# Patient Record
Sex: FEMALE | Race: WHITE | HISPANIC OR LATINO | Employment: FULL TIME | URBAN - METROPOLITAN AREA
[De-identification: names, ages, dates, MRNs, and addresses within clinical notes are randomized per-mention and may not be internally consistent; named-entity substitution may affect disease eponyms.]

---

## 2017-02-02 ENCOUNTER — OFFICE VISIT (OUTPATIENT)
Dept: DERMATOLOGY | Facility: CLINIC | Age: 42
End: 2017-02-02

## 2017-02-02 DIAGNOSIS — L91.0 KELOID SCAR: ICD-10-CM

## 2017-02-02 DIAGNOSIS — D22.9 DERMAL AND EPIDERMAL NEVUS: ICD-10-CM

## 2017-02-02 DIAGNOSIS — L81.4 SOLAR LENTIGINOSIS: ICD-10-CM

## 2017-02-02 DIAGNOSIS — D18.01 CHERRY ANGIOMA: ICD-10-CM

## 2017-02-02 DIAGNOSIS — D22.9 MULTIPLE BENIGN NEVI: ICD-10-CM

## 2017-02-02 DIAGNOSIS — L72.11 PILAR CYST: ICD-10-CM

## 2017-02-02 DIAGNOSIS — Z80.8 FAMILY HISTORY OF MELANOMA: Primary | ICD-10-CM

## 2017-02-02 ASSESSMENT — PAIN SCALES - GENERAL: PAINLEVEL: NO PAIN (0)

## 2017-02-02 NOTE — Clinical Note
2/2/2017       RE: Shauna Galdamez  [address] [place] [address]     Dear Colleague,    Thank you for referring your patient, Shauna Galdamez, to the [facility] DERMATOLOGY at [facility]. Please see a copy of my visit note below.    [facility] Dermatology Note    Dermatology Problem List:  Multiple benign appearing nevi  Family history of melanoma  Pilar cyst - right apex scalp    Encounter Date: Feb 2, 2017    CC:   Chief Complaint   Patient presents with     Derm Problem     Shauna is here today for a skin check.        History of Present Illness:  Shauna Galdamez is a 41 year old who presents for follow up evaluation for history of multiple nevi.  She also had a biopsy that had keloidal scar of her central abdomen, she denies any fevers or chills, she denies any lesions of concern today other than a cyst in her scalp she would like removed.  She states that the keloid is very itchy, and she has had keloids in the past.     She also complains again of dermal nevi on her face that have been present for a very long time and not changing in any way. However, she recently has a 2 year old child who has a habit of picking at the patient's dermal nevi on her face despite their attempts to stop the behavior. The patient would like to discuss potential removal of these today.     Past Medical History:   History reviewed. No pertinent past medical history.  History reviewed. No pertinent past surgical history.    Medications:  Current Outpatient Prescriptions   Medication Sig Dispense Refill     triamcinolone acetonide (KENALOG) 10 MG/ML injection Inject 1 mL (10 mg) into the skin once for 1 dose 5 mL 0     FLUoxetine HCl (PROZAC PO)           Allergies   Allergen Reactions     Sulfa Drugs Unknown       Review of Systems:  -Skin Establ Pt: The patient denies any new rash, pruritus, or lesions that are symptomatic, changing or  bleeding, except as per HPI.  -Constitutional: The patient denies fatigue, fevers, chills, unintended weight loss, and night sweats.  -HEENT: Patient denies nonhealing oral sores.    Physical exam:  There were no vitals taken for this visit.  - This is a well developed, well-nourished female in no acute distress, in a pleasant mood.    - Oliva's skin type 2  - A full skin examination was performed including the face, scalp, ears, eyelids, lips, neck, chest, back, abdomen, hands, feet, upper and lower extremities and buttocks was performed and findings are as follows:  1 - Site of previous biopsy is examined and no evidence of recurrence is noted by inspection or palpation. There is however a thick keloidal scar that appears out of proportion of the biopsy site  2 - Benign nevus- Multiple brown pigmented macules and papules are identified on the exam that are under 0.6cm in size and show normal pigment network and benign growth patterns located on the chest, arms, legs  3 - Cherry Angiomas - cherry red papules scattered on the trunk.  4 - Solar lentigines - Scattered brown macules on sun exposed areas.  5 - Dermal nevus - Skin colored fleshy papule(s) located on the face, benign appearing.  6 - Cyst of skin- raised dome shaped 7 mm nodule with a central punctum located on right apex scalp.  - No other lesions of concern on areas examined.     Impression/Plan:  1 - Hypertrophic Scar:  A total of 0.1 mL of kenalog 20mg/ml was injected into the scar.  The patient was advised of the risk of over correction and subsequent atrophy.   2 - Benign nevi- abcd of melanoma were discussed and self skin checks were advised.   3 - Cherry Angiomas: Benign, patient reassured.  No treatment necessary  4 - Solar lentiginosis: Sun protection with SPF 30 or higher recommended  5 - Benign nevi- abcd of melanoma were discussed and self skin checks were advised.   6 - Cyst of the skin- A cyst was identified on r apex scalp. Due to the  location and symptoms associated with it the patient will be scheduled for excision on the next available excision time slot.       Follow-up 1 year or sooner     staffed the patient.    Staff Involved:  Resident(Hanna Cortés)/Staff(as above)          I have personally examined this patient and agree with Dr. Cortés's documentation and plan of care. I have reviewed and amended the resident's note above. The documentation accurately reflects my clinical observations, diagnoses, treatment and follow-up plans.   I was present for key portions of the procedure.     Lucretia Hoover MD  Dermatology Staff

## 2017-02-02 NOTE — MR AVS SNAPSHOT
After Visit Summary   2/2/2017    Shauna Gadlamez    MRN: [id number]           Patient Information     Date Of Birth          1975        Visit Information        Provider Department      2/2/2017 10:15 AM Hanna Cortés MD [facility] Dermatology        Today's Diagnoses     Family history of melanoma    -  1     Multiple benign nevi         Cherry angioma         Solar lentiginosis         Dermal and epidermal nevus            Follow-ups after your visit        Your next 10 appointments already scheduled     Mar 16, 2017  8:00 AM   (Arrive by 7:45 AM)   Procedure with Hanna Cortés MD   [facility] Dermatology ([facility])    [address] [address] [phone]              Who to contact     Please call your clinic at [phone] to:    Ask questions about your health    Make or cancel appointments    Discuss your medicines    Learn about your test results    Speak to your doctor   If you have compliments or concerns about an experience at your clinic, or if you wish to file a complaint, please contact [facility] Patient Relations at [phone] or email us at [email or web address]         Additional Information About Your Visit        MyChart Information     BuildMyMovet gives you secure access to your electronic health record. If you see a primary care provider, you can also send messages to your care team and make appointments. If you have questions, please call your primary care clinic.  If you do not have a primary care provider, please call [phone] and they will assist you.      ADIKTIVO is an electronic gateway that provides easy, online access to your medical records. With ADIKTIVO, you can request a clinic appointment, read your test results, renew a prescription or communicate with your care team.     To access your existing account, please contact your [facility] Clinic or call [phone] for assistance.        Care EveryWhere ID     This is your Care EveryWhere ID. This could be used by other organizations to access your [facility] medical records  [id number]         Blood Pressure from Last 3 Encounters:   11/30/15 115/78    Weight from Last 3 Encounters:   11/30/15 67.132 kg (148 lb)              Today, you had the following     No orders found for display       Primary Care Provider    No Ref-Primary Verified       No address on file        Thank you!     Thank you for choosing [facility] DERMATOLOGY  for your care. Our goal is always to provide you with excellent care. Hearing back from our patients is one way we can continue to improve our services. Please take a few minutes to complete the written survey that you may receive in the mail after your visit with us. Thank you!             Your Updated Medication List - Protect others around you: Learn how to safely use, store and throw away your medicines at www.disposemymeds.org.          This list is accurate as of: 2/2/17 11:02 AM.  Always use your most recent med list.                   Brand Name Dispense Instructions for use    PROZAC PO

## 2017-02-02 NOTE — PROGRESS NOTES
[facility] Dermatology Note    Dermatology Problem List:  Multiple benign appearing nevi  Family history of melanoma  Pilar cyst - right apex scalp    Encounter Date: Feb 2, 2017    CC:   Chief Complaint   Patient presents with     Derm Problem     Shauna is here today for a skin check.        History of Present Illness:  Shauna Galdamez is a 41 year old who presents for follow up evaluation for history of multiple nevi.  She also had a biopsy that had keloidal scar of her central abdomen, she denies any fevers or chills, she denies any lesions of concern today other than a cyst in her scalp she would like removed.  She states that the keloid is very itchy, and she has had keloids in the past.     She also complains again of dermal nevi on her face that have been present for a very long time and not changing in any way. However, she recently has a 2 year old child who has a habit of picking at the patient's dermal nevi on her face despite their attempts to stop the behavior. The patient would like to discuss potential removal of these today.     Past Medical History:   History reviewed. No pertinent past medical history.  History reviewed. No pertinent past surgical history.    Medications:  Current Outpatient Prescriptions   Medication Sig Dispense Refill     triamcinolone acetonide (KENALOG) 10 MG/ML injection Inject 1 mL (10 mg) into the skin once for 1 dose 5 mL 0     FLUoxetine HCl (PROZAC PO)           Allergies   Allergen Reactions     Sulfa Drugs Unknown       Review of Systems:  -Skin Establ Pt: The patient denies any new rash, pruritus, or lesions that are symptomatic, changing or bleeding, except as per HPI.  -Constitutional: The patient denies fatigue, fevers, chills, unintended weight loss, and night sweats.  -HEENT: Patient denies nonhealing oral sores.    Physical exam:  There were no vitals taken for this visit.  - This is a well developed, well-nourished female in no  acute distress, in a pleasant mood.    - Oliva's skin type 2  - A full skin examination was performed including the face, scalp, ears, eyelids, lips, neck, chest, back, abdomen, hands, feet, upper and lower extremities and buttocks was performed and findings are as follows:  1 - Site of previous biopsy is examined and no evidence of recurrence is noted by inspection or palpation. There is however a thick keloidal scar that appears out of proportion of the biopsy site  2 - Benign nevus- Multiple brown pigmented macules and papules are identified on the exam that are under 0.6cm in size and show normal pigment network and benign growth patterns located on the chest, arms, legs  3 - Cherry Angiomas - cherry red papules scattered on the trunk.  4 - Solar lentigines - Scattered brown macules on sun exposed areas.  5 - Dermal nevus - Skin colored fleshy papule(s) located on the face, benign appearing.  6 - Cyst of skin- raised dome shaped 7 mm nodule with a central punctum located on right apex scalp.  - No other lesions of concern on areas examined.     Impression/Plan:  1 - Hypertrophic Scar:  A total of 0.1 mL of kenalog 20mg/ml was injected into the scar.  The patient was advised of the risk of over correction and subsequent atrophy.   2 - Benign nevi- abcd of melanoma were discussed and self skin checks were advised.   3 - Cherry Angiomas: Benign, patient reassured.  No treatment necessary  4 - Solar lentiginosis: Sun protection with SPF 30 or higher recommended  5 - Benign nevi- abcd of melanoma were discussed and self skin checks were advised.   6 - Cyst of the skin- A cyst was identified on r apex scalp. Due to the location and symptoms associated with it the patient will be scheduled for excision on the next available excision time slot.       Follow-up 1 year or sooner     staffed the patient.    Staff Involved:  Resident(Hanna Cortés)/Staff(as above)          I have personally examined this  patient and agree with Dr. Cortés's documentation and plan of care. I have reviewed and amended the resident's note above. The documentation accurately reflects my clinical observations, diagnoses, treatment and follow-up plans.   I was present for key portions of the procedure.     Lucretia Hoover MD  Dermatology Staff

## 2017-02-02 NOTE — NURSING NOTE
Drug Administration Record    Drug Name: triamcinolone acetonide(kenalog)  Dose: 0.1mL of triamcinolone 40mg/mL, 4mg dose  Route administered: ID  NDC #: Kenalog-40 (2171-8469-33)  Amount of waste(mL):0.9mL  Reason for waste: As per MD

## 2017-02-02 NOTE — NURSING NOTE
Dermatology Rooming Note    Shauna Galdamez's goals for this visit include:   Chief Complaint   Patient presents with     Derm Problem     Shauna is here today for a skin check.      Jyoti Cross MA

## 2017-02-06 PROBLEM — D22.9 MULTIPLE BENIGN NEVI: Status: ACTIVE | Noted: 2017-02-06

## 2017-02-06 PROBLEM — L81.4 SOLAR LENTIGINOSIS: Status: ACTIVE | Noted: 2017-02-06

## 2017-02-06 PROBLEM — Z80.8 FAMILY HISTORY OF MELANOMA: Status: ACTIVE | Noted: 2017-02-06

## 2017-02-06 PROBLEM — L72.11 PILAR CYST: Status: ACTIVE | Noted: 2017-02-06

## 2017-02-06 PROBLEM — D22.9: Status: ACTIVE | Noted: 2017-02-06

## 2017-02-06 PROBLEM — D18.01 CHERRY ANGIOMA: Status: ACTIVE | Noted: 2017-02-06

## 2017-02-06 PROBLEM — L91.0 KELOID SCAR: Status: ACTIVE | Noted: 2017-02-06

## 2017-03-09 ENCOUNTER — OFFICE VISIT (OUTPATIENT)
Dept: DERMATOLOGY | Facility: CLINIC | Age: 42
End: 2017-03-09

## 2017-03-09 DIAGNOSIS — L72.11 PILAR CYST: Primary | ICD-10-CM

## 2017-03-09 RX ORDER — LIDOCAINE HYDROCHLORIDE AND EPINEPHRINE 10; 10 MG/ML; UG/ML
3 INJECTION, SOLUTION INFILTRATION; PERINEURAL ONCE
Qty: 3 ML | Refills: 0 | OUTPATIENT
Start: 2017-03-09 | End: 2017-03-09

## 2017-03-09 ASSESSMENT — PAIN SCALES - GENERAL
PAINLEVEL: NO PAIN (0)
PAINLEVEL: NO PAIN (0)

## 2017-03-09 NOTE — MR AVS SNAPSHOT
After Visit Summary   3/9/2017    Shauna Galdamez    MRN: [id number]           Patient Information     Date Of Birth          1975        Visit Information        Provider Department      3/9/2017 8:00 AM Tawanda Singh MD [facility] Dermatology        Today's Diagnoses     Pilar cyst    -  1      Care Instructions    Excision Wound Care Instructions  I will experience scar, altered skin color, bleeding, swelling, pain, crusting and redness. I may experience altered sensation. Risks are excessive bleeding, infection, muscle weakness, thick (hypertrophic or keloidal) scar, and recurrence,. A second procedure may be recommended to obtain the best cosmetic or functional result.  Possible complications of any surgical procedure are bleeding, infection, scarring, alteration in skin color and sensation, muscle weakness in the area, wound dehiscence or seperation, or recurrence of the lesion or disease. On occasion, after healing, a secondary procedure or revision may be recommended in order to obtain the best cosmetic or functional result.   After your surgery, a pressure bandage will be placed over the area that has sutures. This will help prevent bleeding. Please follow these instructions until you come back to clinic for suture removal on , as they will help you to prevent complications as your wound heals.  For the First 48 hours After Surgery:  1. Leave the pressure bandage on and keep it dry. If it should come loose, you may retape it, but do not take it off.  2. Relax and take it easy. Do not do any vigorous exercise, heavy lifting, or bending forward. This could cause the wound to bleed.  3. Post-operative pain is usually mild. You may take plain or extra strength Tylenol every 4 hours as needed (do not take more than 4,000mg in one day). Do not take any medicine that contains aspirin, ibuprofen or motrin unless you have been recommended these by a doctor.  Avoid alcohol and vitamin E as  these may increase your tendency to bleed.  4. You may put an ice pack around the bandaged area for 20 minutes every 2-3 hours. This may help reduce swelling, bruising, and pain. Make sure the ice pack is waterproof so that the pressure bandage does not get wet.   5. You may see a small amount of drainage or blood on your pressure bandage. This is normal. However, if drainage or bleeding continues or saturates the bandage, you will need to apply firm pressure over the bandage with a washcloth for 15 minutes. If bleeding continues after applying pressure for 15 minutes then go to the nearest emergency room.  48 Hours After Surgery  Carefully remove the bandage and start daily wound care and dressing changes. You may also now shower and get the wound wet. Wash wound with a mild soap and water.  Use caution when washing the wound. Be gentle and do not let the forceful shower stream hit the wound directly.  PAT dry.  Daily Wound Care:  1. Wash wound with a mild soap and water.  Use caution when washing the wound, be gentle and do not let the forceful shower stream hit the wound directly.  2. PAT DRY.  3. Apply Vaseline (from a new container or tube) over the suture line with a Q-tip. It is very important to keep the wound continuously moist, as wounds heal best in a moist environment.  4.  Keep the site covered until sutures are removed, you can cover it with a Telfa (non-stick) dressing and tape or a band-aid.    5. If you are unable to keep wound covered, you must apply Vaseline every 2 - 3 hours (while awake) to ensure it is being kept moist for optimal healing. A dressing overnight is recommended to keep the area moist.   Call Us If:  1. You have pain that is not controlled with Tylenol.  2. You have signs or symptoms of an infection, such as: fever over 100 degrees F, redness, warmth, or foul-smelling or yellow/creamy drainage from the wound.  Who should I call with questions?    [facility]  [place]: [phone]     [facility]: [phone]    For urgent needs outside of business hours call the [facility] at [phone] and ask for the dermatology resident on call            Follow-ups after your visit        Who to contact     Please call your clinic at [phone] to:    Ask questions about your health    Make or cancel appointments    Discuss your medicines    Learn about your test results    Speak to your doctor   If you have compliments or concerns about an experience at your clinic, or if you wish to file a complaint, please contact [facility] Patient Relations at [phone] or email us at [email or web address]         Additional Information About Your Visit        Software 2000hart Information     G-mode gives you secure access to your electronic health record. If you see a primary care provider, you can also send messages to your care team and make appointments. If you have questions, please call your primary care clinic.  If you do not have a primary care provider, please call [phone] and they will assist you.      G-mode is an electronic gateway that provides easy, online access to your medical records. With G-mode, you can request a clinic appointment, read your test results, renew a prescription or communicate with your care team.     To access your existing account, please contact your [facility] or call [phone] for assistance.        Care EveryWhere ID     This is your Care EveryWhere ID. This could be used by other organizations to access your [facility] medical records  [id number]         Blood Pressure from Last 3 Encounters:   No data found for BP    Weight from Last 3 Encounters:   No data found for Wt              Today, you had the following     No orders found for display         Today's Medication Changes          These changes are accurate as of: 3/9/17 11:59 PM.   If you have any questions, ask your nurse or doctor.               Start taking these medicines.        Dose/Directions    lidocaine 1% with EPINEPHrine 1:100,000 1 %-1:355911 injection   Used for:  Pilar cyst   Started by:  Tawanda Singh MD        Dose:  3 mL   Inject 3 mLs into the skin once for 1 dose   Quantity:  3 mL   Refills:  0            Where to get your medicines      Some of these will need a paper prescription and others can be bought over the counter.  Ask your nurse if you have questions.     You don't need a prescription for these medications     lidocaine 1% with EPINEPHrine 1:100,000 1 %-1:953671 injection                Primary Care Provider    No Ref-Primary Verified       No address on file        Thank you!     Thank you for choosing [facility] DERMATOLOGY  for your care. Our goal is always to provide you with excellent care. Hearing back from our patients is one way we can continue to improve our services. Please take a few minutes to complete the written survey that you may receive in the mail after your visit with us. Thank you!             Your Updated Medication List - Protect others around you: Learn how to safely use, store and throw away your medicines at www.disposemymeds.org.          This list is accurate as of: 3/9/17 11:59 PM.  Always use your most recent med list.                   Brand Name Dispense Instructions for use    lidocaine 1% with EPINEPHrine 1:100,000 1 %-1:534871 injection     3 mL    Inject 3 mLs into the skin once for 1 dose       PROZAC PO

## 2017-03-09 NOTE — PROGRESS NOTES
I talked with and examined Shauna Galdamez and I agree with the assessment and the plan. I was present for key portions of the procedure. VLADIMIR Kumar MD.

## 2017-03-09 NOTE — NURSING NOTE
Dermatology Rooming Note    Shauna Galdamez's goals for this visit include:   Chief Complaint   Patient presents with     Derm Problem     Patient comes to clinic today for excision of cyst on scalp.     Staci Strickland CMA

## 2017-03-09 NOTE — LETTER
3/9/2017       RE: Shauna Galdamez  [address] [place] [address]     Dear Colleague,    Thank you for referring your patient, Shauna Galdamez, to the [facility] DERMATOLOGY at [facility]. Please see a copy of my visit note below.    I talked with and examined Shauna Galdamez and I agree with the assessment and the plan. I was present for key portions of the procedure. VLADIMIR Kumar MD.      DERMATOLOGIC SURGERY REPORT    NAME OF PROCEDURE:  EXCISION AND INTERMEDIATE CLOSURE    Surgeon:  José Singh    PREOPERATIVE DIAGNOSIS:   Pilar cyst  POSTOPERATIVE DIAGNOSIS: Same  FINAL EXCISION SIZE:  1.0 cm x 0.5 cm, with 0 mm margin  FINAL REPAIR LENGTH:  1.0 cm    INDICATIONS:  This patient presented with a 1 cm x 0.5 cm pilar cyst on the R frontal scalp.  Excision was indicated.  We discussed the principles of treatment and most likely complications including bleeding, infection, wound dehiscence, pain, nerve damage, and scarring.  Informed consent was obtained and the patient underwent the procedure as follows.    PROCEDURE:  The patient was taken to the operative suite.  The treatment area was anesthetized with 1% lidocaine and epinephrine mixed 1:100,000 with 0.5% Marcaine.  The area was washed with Hibiclens, rinsed with saline and draped with sterile towels.  The lesion was delineated and excised down to subcutaneous fat.  Hemostasis was obtained by electrocoagulation.  With a margin of 0 mm, the final excision size was 1 cm x 0.1 cm.      REPAIR:  In order to repair this defect while maintaining the normal anatomic relations and function, we elected to utilize an intermediate linear closure.  Closure was oriented so that the wound was in the patient's natural skin tension lines.  Deeper layers of the subcutaneous tissue were undermined first.  Deep dermal and subcutaneous layer closure was performed using 4-0 Vicryl deep, intradermal and  subcutaneous sutures.   Final cutaneous approximation was achieved with three 4-0 chromic gut simple interrupted sutures.     The final wound length was 1.0 cm.  A total of 3 ml of anesthesia was administered for all surgical sites.  Estimated blood loss was less than 1 ml for all surgical sites.  A sterile pressure dressing was applied and wound care instructions, with a written handout, were given.  The patient was [facility] from the [facility] alert and ambulatory.        Again, thank you for allowing me to participate in the care of your patient.      Sincerely,    Tawanda Singh MD

## 2017-03-09 NOTE — PATIENT INSTRUCTIONS

## 2017-03-09 NOTE — PROGRESS NOTES
DERMATOLOGIC SURGERY REPORT    NAME OF PROCEDURE:  EXCISION AND INTERMEDIATE CLOSURE    Surgeon:  José Singh    PREOPERATIVE DIAGNOSIS:   Pilar cyst  POSTOPERATIVE DIAGNOSIS: Same  FINAL EXCISION SIZE:  1.0 cm x 0.5 cm, with 0 mm margin  FINAL REPAIR LENGTH:  1.0 cm    INDICATIONS:  This patient presented with a 1 cm x 0.5 cm pilar cyst on the R frontal scalp.  Excision was indicated.  We discussed the principles of treatment and most likely complications including bleeding, infection, wound dehiscence, pain, nerve damage, and scarring.  Informed consent was obtained and the patient underwent the procedure as follows.    PROCEDURE:  The patient was taken to the operative suite.  The treatment area was anesthetized with 1% lidocaine and epinephrine mixed 1:100,000 with 0.5% Marcaine.  The area was washed with Hibiclens, rinsed with saline and draped with sterile towels.  The lesion was delineated and excised down to subcutaneous fat.  Hemostasis was obtained by electrocoagulation.  With a margin of 0 mm, the final excision size was 1 cm x 0.1 cm.      REPAIR:  In order to repair this defect while maintaining the normal anatomic relations and function, we elected to utilize an intermediate linear closure.  Closure was oriented so that the wound was in the patient's natural skin tension lines.  Deeper layers of the subcutaneous tissue were undermined first.  Deep dermal and subcutaneous layer closure was performed using 4-0 Vicryl deep, intradermal and subcutaneous sutures.   Final cutaneous approximation was achieved with three 4-0 chromic gut simple interrupted sutures.     The final wound length was 1.0 cm.  A total of 3 ml of anesthesia was administered for all surgical sites.  Estimated blood loss was less than 1 ml for all surgical sites.  A sterile pressure dressing was applied and wound care instructions, with a written handout, were given.  [facility] and ambulatory.

## 2017-03-13 LAB — COPATH REPORT: NORMAL

## 2017-04-27 ENCOUNTER — OFFICE VISIT (OUTPATIENT)
Dept: DERMATOLOGY | Facility: CLINIC | Age: 42
End: 2017-04-27

## 2017-04-27 DIAGNOSIS — D48.5 NEOPLASM OF UNCERTAIN BEHAVIOR OF SKIN: Primary | ICD-10-CM

## 2017-04-27 RX ORDER — LIDOCAINE HYDROCHLORIDE AND EPINEPHRINE 10; 10 MG/ML; UG/ML
3 INJECTION, SOLUTION INFILTRATION; PERINEURAL ONCE
Qty: 1 ML | Refills: 0 | OUTPATIENT
Start: 2017-04-27 | End: 2017-04-27

## 2017-04-27 ASSESSMENT — PAIN SCALES - GENERAL
PAINLEVEL: NO PAIN (0)
PAINLEVEL: NO PAIN (0)

## 2017-04-27 NOTE — PATIENT INSTRUCTIONS
Wound Care After a Biopsy    What is a skin biopsy?  A skin biopsy allows the doctor to examine a very small piece of tissue under the microscope to determine the diagnosis and the best treatment for the skin condition. A local anesthetic (numbing medicine)  is injected with a very small needle into the skin area to be tested. A small piece of skin is taken from the area. Sometimes a suture (stitch) is used.     What are the risks of a skin biopsy?  I will experience scar, bleeding, swelling, pain, crusting and redness. I may experience incomplete removal or recurrence. Risks of this procedure are excessive bleeding, bruising, infection, nerve damage, numbness, thick (hypertrophic or keloidal) scar and non-diagnostic biopsy.    How should I care for my wound for the first 24 hours?    Keep the wound dry and covered for 24 hours    If it bleeds, hold direct pressure on the area for 15 minutes. If bleeding does not stop then go to the emergency room    Avoid strenuous exercise the first 1-2 days or as your doctor instructs you    How should I care for the wound after 24 hours?    After 24 hours, remove the bandage    You may bathe or shower as normal    If you had a scalp biopsy, you can shampoo as usual and can use shower water to clean the biopsy site daily    Clean the wound twice a day with gentle soap and water    Do not scrub, be gentle    Apply white petroleum/Vaseline after cleaning the wound with a cotton swab or a clean finger, and keep the site covered with a Bandaid /bandage. Bandages are not necessary with a scalp biopsy    If you are unable to cover the site with a Bandaid /bandage, re-apply ointment 2-3 times a day to keep the site moist. Moisture will help with healing    Avoid strenuous activity for first 1-2 days    Avoid lakes, rivers, pools, and oceans until the stitches are removed or the site is healed    How do I clean my wound?    Wash hands for 15 minutes with soap or use hand  before  all wound care    Clean the wound with gentle soap and water    Apply white petroleum/Vaseline  to wound after it is clean    Replace the Bandaid /bandage to keep the wound covered for the first few days or as instructed by your doctor    If you had a scalp biopsy, warm shower water to the area on a daily basis should suffice    What should I use to clean my wound?     Cotton-tipped applicators (Qtips )    White petroleum jelly (Vaseline ). Use a clean new container and use Q-tips to apply.    Bandaids   as needed    Gentle soap     How should I care for my wound long term?    Do not get your wound dirty    Keep up with wound care for one week or until the area is healed.    A small scab will form and fall off by itself when the area is completely healed. The area will be red and will become pink in color as it heals. Sun protection is very important for how your scar will turn out. Sunscreen with an SPF 30 or greater is recommended once the area is healed.    If you have stitches, stitches need to be removed in 14 days. You may return to our clinic for this or you may have it done locally at your doctor s office.    You should have some soreness but it should be mild and slowly go away over several days. Talk to your doctor about using tylenol for pain,    When should I call my doctor?  If you have increased:     Pain or swelling    Pus or drainage (clear or slightly yellow drainage is ok)    Temperature over 100F    Spreading redness or warmth around wound    When will I hear about my results?  The biopsy results can take 2-3 weeks to come back. The clinic will call you with the results, send you a [employer] message, or have you schedule a follow-up clinic or phone time to discuss the results. Contact our clinics if you do not hear from us in 3 weeks.     Who should I call with questions?    [facility]: [phone]     [facility]: [phone]    For  urgent needs outside of business hours call the [facility] at [phone] and ask for the dermatology resident on call

## 2017-04-27 NOTE — PROGRESS NOTES
[facility] Dermatology Note      Dermatology Problem List:   1.  Multiple benign nevi.   2.  Family history of melanoma.   3.  Pilar cyst on right frontal scalp, excised on 03/09/2017.   4.  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        Encounter Date:  04/27/2017       CC:  Irritating mole on back.      History of Present Illness:   This is a 41-year-old, very pleasant female who is here with her daughter today for evaluation of a papule on the mid left back.  She reports that it was present on her skin check on 02/02/2017, but since then reports that she has had some irritation associated with her bra strap.  She states that she would like to have this removed; however, she has had issues with hypertrophic scarring and keloids in the past with previous biopsies and would prefer not to get a shave biopsy.  She has had surgical scars that have been sutured before that have healed better and have not left irritating, itchy scars.  She otherwise feels her usual state of health and reports that she has no other new lesions since February that are concerning.        Past Medical History:    No pertinent past medical history.      Social History:     Here with daughter today      Family History:   Not discussed     Medications:   The patient currently denies taking any medications.        Allergies:   Reviewed      Review of Systems:   Negative except for HPI.  No other skin concerns.   CONSTITUTIONAL:  No fevers, chills or night sweats.       Physical exam:   GENERAL:  This is a well-appearing  female in no acute distress, awake and oriented x3, cooperative.  Oliva skin type II.   SKIN:  Exam is performed on the face, neck, chest, abdomen and back as well as upper extremities.  There is a soft, brown papule over the left mid back right underneath her bra strap.  There are also multiple scattered brown macules as well as red vascular papules  on the trunk.  There are also brown macules over sun-exposed areas.  There is a well-healed scar over the right frontal scalp from previously excised pilar cyst.  There is a hypertrophic, bright-pink to violaceous plaque/scar on the lower abdomen.       Impression/Plan:   1.  Neoplasm of uncertain behavior:  Suspect irritated nevus.  We will perform a 4 mm punch biopsy to remove this lesion.  She preferred a punch biopsy over a shave biopsy due to concern with healing with shave biopsy.  She otherwise tolerated the procedure well; see below for procedure details.     2.  Benign nevi:  The patient had no concerning lesions.   3.  Hypertrophic scar:  Previously injected and has improved.  Not significantly itchy today.        Punch Biopsy:     After discussion of benefits and risks, including bleeding, infections, scar and incomplete removal, written consent was obtained.  A time-out was performed.  The area was cleansed with isopropyl alcohol.  About 1 mL of 1% lidocaine with epi was injected to the area, and a 4 mm punch biopsy was performed and closed with a 4-0 Prolene stitch using a figure-of-eight technique.  Vaseline and Band-Aid were applied, and care instructions were given as well as written instructions and her AVS.  The patient was discharged from Dermatology Clinic in good condition and was counseled to remove her stitches in 10-14 days.        This patient was seen and staffed with Dr. Kumar.  The patient will follow up in 1 year or sooner if there are any new lesions.        Follow up in 1 year or sooner.      Dr. Kota Kumar staffed the patient.      Staff Involved:   Dictated by Resident(Cortney Narayanan MD)/Staff(as above)     I talked with and examined Shauna Galdamez and I agree with the assessment and the plan. I was present for the discussion preceding the  procedure. VLADIMIR Kumar MD.

## 2017-04-27 NOTE — NURSING NOTE
Dermatology Rooming Note    Shauna Galdamez's goals for this visit include:   Chief Complaint   Patient presents with     Derm Problem     shauna is here today for a skin check. She would like a spot on her back looked at.     Jyoti Cross MA

## 2017-04-27 NOTE — LETTER
4/27/2017       RE: Shauna Galdamez  [address] [place] [address]     Dear Colleague,    Thank you for referring your patient, Shauna Galdamez, to the [facility] DERMATOLOGY at [facility]. Please see a copy of my visit note below.      Pictures were placed in Pt's chart today for future reference.      [facility] Dermatology Note      Dermatology Problem List:   1.  Multiple benign nevi.   2.  Family history of melanoma.   3.  Pilar cyst on right frontal scalp, excised on 03/09/2017.   4.  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        Encounter Date:  04/27/2017       CC:  Irritating mole on back.      History of Present Illness:   This is a 41-year-old, very pleasant female who is here with her daughter today for evaluation of a papule on the mid left back.  She reports that it was present on her skin check on 02/02/2017, but since then reports that she has had some irritation associated with her bra strap.  She states that she would like to have this removed; however, she has had issues with hypertrophic scarring and keloids in the past with previous biopsies and would prefer not to get a shave biopsy.  She has had surgical scars that have been sutured before that have healed better and have not left irritating, itchy scars.  She otherwise feels her usual state of health and reports that she has no other new lesions since February that are concerning.        Past Medical History:    No pertinent past medical history.      Social History:     Here with daughter today      Family History:   Not discussed     Medications:   The patient currently denies taking any medications.        Allergies:   Reviewed      Review of Systems:   Negative except for HPI.  No other skin concerns.   CONSTITUTIONAL:  No fevers, chills or night sweats.       Physical exam:   GENERAL:  This is a well-appearing   female in no acute distress, awake and oriented x3, cooperative.  Oliva skin type II.   SKIN:  Exam is performed on the face, neck, chest, abdomen and back as well as upper extremities.  There is a soft, brown papule over the left mid back right underneath her bra strap.  There are also multiple scattered brown macules as well as red vascular papules on the trunk.  There are also brown macules over sun-exposed areas.  There is a well-healed scar over the right frontal scalp from previously excised pilar cyst.  There is a hypertrophic, bright-pink to violaceous plaque/scar on the lower abdomen.       Impression/Plan:   1.  Neoplasm of uncertain behavior:  Suspect irritated nevus.  We will perform a 4 mm punch biopsy to remove this lesion.  She preferred a punch biopsy over a shave biopsy due to concern with healing with shave biopsy.  She otherwise tolerated the procedure well; see below for procedure details.     2.  Benign nevi:  The patient had no concerning lesions.   3.  Hypertrophic scar:  Previously injected and has improved.  Not significantly itchy today.        Punch Biopsy:     After discussion of benefits and risks, including bleeding, infections, scar and incomplete removal, written consent was obtained.  A time-out was performed.  The area was cleansed with isopropyl alcohol.  About 1 mL of 1% lidocaine with epi was injected to the area, and a 4 mm punch biopsy was performed and closed with a 4-0 Prolene stitch using a figure-of-eight technique.  Vaseline and Band-Aid were applied, and care instructions were given as well as written instructions and her AVS.  The patient was discharged from Dermatology Clinic in good condition and was counseled to remove her stitches in 10-14 days.        This patient was seen and staffed with Dr. Kumar.  The patient will follow up in 1 year or sooner if there are any new lesions.        Follow up in 1 year or sooner.      Dr. Kota Kumar staffed the  patient.      Staff Involved:   Dictated by Resident(Cortney Narayanan MD)/Staff(as above)     I talked with and examined Shauna Galdamez and I agree with the assessment and the plan. I was present for the discussion preceding the  procedure. VLADIMIR Kumar MD.      Again, thank you for allowing me to participate in the care of your patient.      Sincerely,    Cortney Narayanan MD, MD

## 2017-04-27 NOTE — MR AVS SNAPSHOT
After Visit Summary   4/27/2017    Shauna Galdamez    MRN: [id number]           Patient Information     Date Of Birth          1975        Visit Information        Provider Department      4/27/2017 9:45 AM Cortney Narayanan MD [facility] Dermatology        Today's Diagnoses     Neoplasm of uncertain behavior of skin    -  1      Care Instructions    Wound Care After a Biopsy    What is a skin biopsy?  A skin biopsy allows the doctor to examine a very small piece of tissue under the microscope to determine the diagnosis and the best treatment for the skin condition. A local anesthetic (numbing medicine)  is injected with a very small needle into the skin area to be tested. A small piece of skin is taken from the area. Sometimes a suture (stitch) is used.     What are the risks of a skin biopsy?  I will experience scar, bleeding, swelling, pain, crusting and redness. I may experience incomplete removal or recurrence. Risks of this procedure are excessive bleeding, bruising, infection, nerve damage, numbness, thick (hypertrophic or keloidal) scar and non-diagnostic biopsy.    How should I care for my wound for the first 24 hours?    Keep the wound dry and covered for 24 hours    If it bleeds, hold direct pressure on the area for 15 minutes. If bleeding does not stop then go to the emergency room    Avoid strenuous exercise the first 1-2 days or as your doctor instructs you    How should I care for the wound after 24 hours?    After 24 hours, remove the bandage    You may bathe or shower as normal    If you had a scalp biopsy, you can shampoo as usual and can use shower water to clean the biopsy site daily    Clean the wound twice a day with gentle soap and water    Do not scrub, be gentle    Apply white petroleum/Vaseline after cleaning the wound with a cotton swab or a clean finger, and keep the site covered with a Bandaid /bandage. Bandages are not necessary with a scalp biopsy    If you are  unable to cover the site with a Bandaid /bandage, re-apply ointment 2-3 times a day to keep the site moist. Moisture will help with healing    Avoid strenuous activity for first 1-2 days    Avoid lakes, rivers, pools, and oceans until the stitches are removed or the site is healed    How do I clean my wound?    Wash hands for 15 minutes with soap or use hand  before all wound care    Clean the wound with gentle soap and water    Apply white petroleum/Vaseline  to wound after it is clean    Replace the Bandaid /bandage to keep the wound covered for the first few days or as instructed by your doctor    If you had a scalp biopsy, warm shower water to the area on a daily basis should suffice    What should I use to clean my wound?     Cotton-tipped applicators (Qtips )    White petroleum jelly (Vaseline ). Use a clean new container and use Q-tips to apply.    Bandaids   as needed    Gentle soap     How should I care for my wound long term?    Do not get your wound dirty    Keep up with wound care for one week or until the area is healed.    A small scab will form and fall off by itself when the area is completely healed. The area will be red and will become pink in color as it heals. Sun protection is very important for how your scar will turn out. Sunscreen with an SPF 30 or greater is recommended once the area is healed.    If you have stitches, stitches need to be removed in 14 days. You may return to our clinic for this or you may have it done locally at your doctor s office.    You should have some soreness but it should be mild and slowly go away over several days. Talk to your doctor about using tylenol for pain,    When should I call my doctor?  If you have increased:     Pain or swelling    Pus or drainage (clear or slightly yellow drainage is ok)    Temperature over 100F    Spreading redness or warmth around wound    When will I hear about my results?  The biopsy results can take 2-3 weeks to come  back. The clinic will call you with the results, send you a [employer] message, or have you schedule a follow-up clinic or phone time to discuss the results. Contact our clinics if you do not hear from us in 3 weeks.     Who should I call with questions?    [facility]: [phone]     [facility]: [phone]    For urgent needs outside of business hours call the [facility] [[email or web address]] at [phone] and ask for the dermatology resident on call            Follow-ups after your visit        Who to contact     Please call your clinic at [phone] to:    Ask questions about your health    Make or cancel appointments    Discuss your medicines    Learn about your test results    Speak to your doctor   If you have compliments or concerns about an experience at your clinic, or if you wish to file a complaint, please contact [facility] Patient Relations at [phone] or email us at [email or web address]         Additional Information About Your Visit        [employer] Information     [employer] gives you secure access to your electronic health record. If you see a primary care provider, you can also send messages to your care team and make appointments. If you have questions, please call your primary care clinic.  If you do not have a primary care provider, please call [phone] and they will assist you.      [employer] is an electronic gateway that provides easy, online access to your medical records. With [employer], you can request a clinic appointment, read your test results, renew a prescription or communicate with your care team.     To access your existing account, please contact your [facility] or call [phone] for assistance.        Care EveryWhere ID     This is your Care EveryWhere ID. This could be used by other organizations to access your [place] medical records  [id number]          Blood Pressure from Last 3 Encounters:   11/30/15 115/78    Weight from Last 3 Encounters:   11/30/15 67.1 kg (148 lb)              We Performed the Following     BIOPSY SKIN/SUBQ/MUC MEM, SINGLE LESION     Surgical pathology exam          Today's Medication Changes          These changes are accurate as of: 4/27/17 10:03 AM.  If you have any questions, ask your nurse or doctor.               Start taking these medicines.        Dose/Directions    lidocaine 1% with EPINEPHrine 1:100,000 1 %-1:492368 injection   Used for:  Neoplasm of uncertain behavior of skin   Started by:  Cortney Narayanan MD        Dose:  3 mL   Inject 3 mLs into the skin once for 1 dose   Quantity:  1 mL   Refills:  0            Where to get your medicines      Some of these will need a paper prescription and others can be bought over the counter.  Ask your nurse if you have questions.     You don't need a prescription for these medications     lidocaine 1% with EPINEPHrine 1:100,000 1 %-1:772327 injection                Primary Care Provider    No Ref-Primary Verified       No address on file        Thank you!     Thank you for choosing [facility] DERMATOLOGY  for your care. Our goal is always to provide you with excellent care. Hearing back from our patients is one way we can continue to improve our services. Please take a few minutes to complete the written survey that you may receive in the mail after your visit with us. Thank you!             Your Updated Medication List - Protect others around you: Learn how to safely use, store and throw away your medicines at www.disposemymeds.org.          This list is accurate as of: 4/27/17 10:03 AM.  Always use your most recent med list.                   Brand Name Dispense Instructions for use    lidocaine 1% with EPINEPHrine 1:100,000 1 %-1:472884 injection     1 mL    Inject 3 mLs into the skin once for 1 dose       PROZAC PO

## 2017-05-01 LAB — COPATH REPORT: NORMAL

## 2017-05-11 ENCOUNTER — ALLIED HEALTH/NURSE VISIT (OUTPATIENT)
Dept: DERMATOLOGY | Facility: CLINIC | Age: 42
End: 2017-05-11

## 2017-05-11 DIAGNOSIS — Z48.02 VISIT FOR SUTURE REMOVAL: Primary | ICD-10-CM

## 2017-05-11 NOTE — MR AVS SNAPSHOT
After Visit Summary   5/11/2017    Shauna Galdamez    MRN: [id number]           Patient Information     Date Of Birth          1975        Visit Information        Provider Department      5/11/2017 9:30 AM Nurse,  Dermatology [facility] Dermatology        Today's Diagnoses     Visit for suture removal    -  1      Care Instructions      Staple Removal (No Complication)  You were seen today for a suture removal. Your wound is healing as expected. It has healed well enough that the sutures or staples were ready to be removed. The wound will continue to heal below the surface of the skin for a few months. It is unlikely that you will have any further problem.  At this time there is no sign of a wound infection. Signs of a wound infection include:    Increasing redness or swelling around the wound    Increased warmth of the wound    Worsening pain    Red streaking lines away from the wound    Draining pus  Home care    Keep the wound clean and dry. Use an adhesive strip, if needed, to keep the wound from getting dirty for the next week.    Wash the wound carefully with soap and water daily during the next week.    You may shower and bathe as usual.    The wound may separate, or split open. If this happens, keep it clean and covered until you follow up or return for a recheck.    When exposed to the sun, scars can take on red or brown discoloration. To decrease scar color, protect the area from sun exposure. Use sun block for 6 to 12 months.  Follow-up care   Follow up with your healthcare provider, or as advised.  When to seek medical advice   Contact your healthcare provider right away if any of the following occur:    Increasing pain in the wound    Redness, swelling, or pus coming from the wound    Fever of 100.4 F (38 C) or higher, or as directed by your healthcare provider    3425-5437 [employer]. [address] [place] [address], [place] [address]. All rights reserved. This  information is not intended as a substitute for professional medical care. Always follow your healthcare professional's instructions.              Follow-ups after your visit        Who to contact     Please call your clinic at [phone] to:    Ask questions about your health    Make or cancel appointments    Discuss your medicines    Learn about your test results    Speak to your doctor   If you have compliments or concerns about an experience at your clinic, or if you wish to file a complaint, please contact [facility] Patient Relations at [phone] or email us at [email or web address]         Additional Information About Your Visit        Changershart Information     MENABANQER gives you secure access to your electronic health record. If you see a primary care provider, you can also send messages to your care team and make appointments. If you have questions, please call your primary care clinic.  If you do not have a primary care provider, please call [phone] and they will assist you.      MENABANQER is an electronic gateway that provides easy, online access to your medical records. With MENABANQER, you can request a clinic appointment, read your test results, renew a prescription or communicate with your care team.     To access your existing account, please contact your [facility] or call [phone] for assistance.        Care EveryWhere ID     This is your Care EveryWhere ID. This could be used by other organizations to access your [facility] medical records  [id number]         Blood Pressure from Last 3 Encounters:   11/30/15 115/78    Weight from Last 3 Encounters:   11/30/15 67.1 kg (148 lb)              Today, you had the following     No orders found for display       Primary Care Provider    No Ref-Primary Verified       No address on file        Thank you!     Thank you for choosing [facility]  for your care. Our goal is  always to provide you with excellent care. Hearing back from our patients is one way we can continue to improve our services. Please take a few minutes to complete the written survey that you may receive in the mail after your visit with us. Thank you!             Your Updated Medication List - Protect others around you: Learn how to safely use, store and throw away your medicines at www.disposemymeds.org.          This list is accurate as of: 5/11/17  9:56 AM.  Always use your most recent med list.                   Brand Name Dispense Instructions for use    PROZAC PO

## 2017-05-11 NOTE — PATIENT INSTRUCTIONS
Staple Removal (No Complication)  You were seen today for a suture removal. Your wound is healing as expected. It has healed well enough that the sutures or staples were ready to be removed. The wound will continue to heal below the surface of the skin for a few months. It is unlikely that you will have any further problem.  At this time there is no sign of a wound infection. Signs of a wound infection include:    Increasing redness or swelling around the wound    Increased warmth of the wound    Worsening pain    Red streaking lines away from the wound    Draining pus  Home care    Keep the wound clean and dry. Use an adhesive strip, if needed, to keep the wound from getting dirty for the next week.    Wash the wound carefully with soap and water daily during the next week.    You may shower and bathe as usual.    The wound may separate, or split open. If this happens, keep it clean and covered until you follow up or return for a recheck.    When exposed to the sun, scars can take on red or brown discoloration. To decrease scar color, protect the area from sun exposure. Use sun block for 6 to 12 months.  Follow-up care   Follow up with your healthcare provider, or as advised.  When to seek medical advice   Contact your healthcare provider right away if any of the following occur:    Increasing pain in the wound    Redness, swelling, or pus coming from the wound    Fever of 100.4 F (38 C) or higher, or as directed by your healthcare provider    2581-3022 [employer]. [address] [place] [address], [place] [address]. All rights reserved. This information is not intended as a substitute for professional medical care. Always follow your healthcare professional's instructions.

## 2017-05-11 NOTE — NURSING NOTE
Dermatology Rooming Note    Shauna Galdamez's goals for this visit include:   Chief Complaint   Patient presents with     Suture Removal     Punch biopsy done on 4/27/17 on left mid back     Naomie Avendano CMA    Removed 1 suture, from left mid back without complication.  Patient tolerated procedure well, and understood all followup instructions.

## 2017-05-16 NOTE — PROGRESS NOTES
Hi please call with results showing a benign mole with congenital features, and advise that no further treatment is required.

## 2017-09-03 ENCOUNTER — HEALTH MAINTENANCE LETTER (OUTPATIENT)
Age: 42
End: 2017-09-03

## 2018-02-28 NOTE — TELEPHONE ENCOUNTER
APPT INFO    Date /Time: 3/1/18 at 4PM   Reason for Appt: Ear fullness / ear pain   Ref Provider/Clinic: Self   Are there internal records? Yes/No?  IF YES, list clinic names: No   Are there outside records? Yes/No? No - per appt notes - pt has no outside records   Patient Contact (Y/N) & Call Details: No   Action: Chart reviewed

## 2018-03-01 ENCOUNTER — PRE VISIT (OUTPATIENT)
Dept: OTOLARYNGOLOGY | Facility: CLINIC | Age: 43
End: 2018-03-01

## 2018-03-01 ENCOUNTER — OFFICE VISIT (OUTPATIENT)
Dept: OTOLARYNGOLOGY | Facility: CLINIC | Age: 43
End: 2018-03-01
Payer: COMMERCIAL

## 2018-03-01 DIAGNOSIS — H69.93 DYSFUNCTION OF BOTH EUSTACHIAN TUBES: Primary | ICD-10-CM

## 2018-03-01 ASSESSMENT — PAIN SCALES - GENERAL: PAINLEVEL: NO PAIN (0)

## 2018-03-01 NOTE — LETTER
3/1/2018       RE: Shauna Galdamez  [address] [place] [address]     Dear Colleague,    Thank you for referring your patient, Shauna Galdamez, to the [facility] EAR NOSE [facility] THROAT at [facility]. Please see a copy of my visit note below.    The patient presents with a history of eustacian tube dysfunction and severe pressure in the ears with changes in altitude. She is taking a vacation to [place] next week and she is worried about the discomfort. She has a long history of ear infections as a child. The patient denies sinusitis, rhinitis, facial pain, nasal obstruction or purulent nasal discharge. The patient denies chronic or recurrent tonsillitis, chronic or recurrent pharyngitis. The patient denies otalgia, otorrhea, ear infections, dizziness or tinnitus.     This patient is seen in consultation as a self referral to my clinic.    All other systems were reviewed and they are either negative or they are not directly pertinent to this Otolaryngology examination.      Past Medical History:    No past medical history on file.    Past Surgical History:    No past surgical history on file.    Medications:  Current Outpatient Prescriptions:      FLUoxetine HCl (PROZAC PO), , Disp: , Rfl:     Allergies:  Sulfa drugs and Bupropion    Physical Examination:  The patient is a well developed, well nourished female in no apparent distress.  She is normocephalic, atraumatic with pupils equally round and reactive to light.    Oral Cavity Examination:  Normal mucosa with no masses or lesions  Nasal Examination:  Normal mucosa with no masses or lesions  Ear Examination: Ear canals clear with no serous or purulent effusions in the middle ear spaces bilaterally.  The  tympanic membranes are scared and retracted. Middle ear spaces are free of effusions and infections  Neurological Examination: Facial nerve function intact and symmetric  Integumentary Examination: No  lesions on the skin of the head and neck  Neck Examination:  No masses or lesions, no lymphadenopathy  Endocrine Examination:  Normal thyroid examination    Assessment and Plan:  The patient presents with a history of eustacian tube dysfunction and severe discomfort with changes in altitude. We have discussed options of treatment prior to trip scheduled next week. She would like to proceed with bilateral myringotomy and tubes and she will be referred for a CT scan of the temporal bones. She will be seen tomorrow morning for her bilateral myringotomy and tubes as she has a sleeping child with her today and she is not able to do this procedure at this time.       Again, thank you for allowing me to participate in the care of your patient.      Sincerely,    Wale Ramos MD

## 2018-03-01 NOTE — NURSING NOTE
Chief Complaint   Patient presents with     Consult     bilateral ear fullness and pain, difficulty with altitude changes.     Richard Montes De Oca LPN

## 2018-03-01 NOTE — PROGRESS NOTES
The patient presents with a history of eustacian tube dysfunction and severe pressure in the ears with changes in altitude. She is taking a vacation to [place] next week and she is worried about the discomfort. She has a long history of ear infections as a child. The patient denies sinusitis, rhinitis, facial pain, nasal obstruction or purulent nasal discharge. The patient denies chronic or recurrent tonsillitis, chronic or recurrent pharyngitis. The patient denies otalgia, otorrhea, ear infections, dizziness or tinnitus.     This patient is seen in consultation as a self referral to my clinic.    All other systems were reviewed and they are either negative or they are not directly pertinent to this Otolaryngology examination.      Past Medical History:    No past medical history on file.    Past Surgical History:    No past surgical history on file.    Medications:      Current Outpatient Prescriptions:      FLUoxetine HCl (PROZAC PO), , Disp: , Rfl:     Allergies:    Sulfa drugs and Bupropion    Physical Examination:    The patient is a well developed, well nourished female in no apparent distress.  She is normocephalic, atraumatic with pupils equally round and reactive to light.    Oral Cavity Examination:  Normal mucosa with no masses or lesions  Nasal Examination:  Normal mucosa with no masses or lesions  Ear Examination: Ear canals clear with no serous or purulent effusions in the middle ear spaces bilaterally.  The  tympanic membranes are scared and retracted. Middle ear spaces are free of effusions and infections  Neurological Examination: Facial nerve function intact and symmetric  Integumentary Examination: No lesions on the skin of the head and neck  Neck Examination:  No masses or lesions, no lymphadenopathy  Endocrine Examination:  Normal thyroid examination    Assessment and Plan:    The patient presents with a history of eustacian tube dysfunction and severe discomfort with changes in altitude. We  have discussed options of treatment prior to trip scheduled next week. She would like to proceed with bilateral myringotomy and tubes and she will be referred for a CT scan of the temporal bones. She will be seen tomorrow morning for her bilateral myringotomy and tubes as she has a sleeping child with her today and she is not able to do this procedure at this time.

## 2018-03-01 NOTE — PATIENT INSTRUCTIONS
The patient presents with a history of eustacian tube dysfunction and severe discomfort with changes in altitude. We have discussed options of treatment prior to trip scheduled next week. She would like to proceed with bilateral myringotomy and tubes and she will be referred for a CT scan of the temporal bones. She will be seen tomorrow morning for her bilateral myringotomy and tubes as she has a sleeping child with her today and she is not able to do this procedure at this time.

## 2018-03-01 NOTE — MR AVS SNAPSHOT
After Visit Summary   3/1/2018    Shauna Galdamez    MRN: [id number]           Patient Information     Date Of Birth          1975        Visit Information        Provider Department      3/1/2018 4:00 PM Wale Ramos MD [facility] Ear Nose and Throat        Today's Diagnoses     Dysfunction of both eustachian tubes    -  1      Care Instructions    The patient presents with a history of eustacian tube dysfunction and severe discomfort with changes in altitude. We have discussed options of treatment prior to trip scheduled next week. She would like to proceed with bilateral myringotomy and tubes and she will be referred for a CT scan of the temporal bones. She will be seen tomorrow morning for her bilateral myringotomy and tubes as she has a sleeping child with her today and she is not able to do this procedure at this time.          Follow-ups after your visit        Your next 10 appointments already scheduled     Mar 02, 2018  7:00 AM CST   (Arrive by 6:45 AM)   Return Visit with Wale Ramos MD   [facility]ose [facility] Th[facility] ([facility])    [address] [place] [id number] [phone]              Future tests that were ordered for you today     Open Future Orders        Priority Expected Expires Ordered    CT Temporal orbital sella w/o contrast Routine  3/1/2019 3/1/2018            Who to contact     Please call your clinic at [phone] to:    Ask questions about your health    Make or cancel appointments    Discuss your medicines    Learn about your test results    Speak to your doctor            Additional Information About Your Visit        MyChart Information     [employer] gives you secure access to your electronic health record. If you see a primary care provider, you can also send messages to your care team and make appointments. If you have questions, please call your primary care clinic.  If you do not  have a primary care provider, please call [phone] and they will assist you.      Trendslide is an electronic gateway that provides easy, online access to your medical records. With Trendslide, you can request a clinic appointment, read your test results, renew a prescription or communicate with your care team.     To access your existing account, please contact your [facility] or call [phone] for assistance.        Care EveryWhere ID     This is your Care EveryWhere ID. This could be used by other organizations to access your [facility] medical records  [id number]         Blood Pressure from Last 3 Encounters:   11/30/15 115/78    Weight from Last 3 Encounters:   11/30/15 67.1 kg (148 lb)               Primary Care Provider    None Specified       No primary provider on file.        Equal Access to Services     RON VAUGHAN : Jenae escamilla Socas, waaxda luqadaha, qaybta kaalmada travisyaalanna, deven mi . So [facility] [phone].    ATENCIÓN: Si habla español, tiene a dean disposición servicios gratuitos de asistencia lingüística. Llame al [phone].    We comply with applicable federal civil rights laws and [place] laws. We do not discriminate on the basis of race, color, national origin, age, disability, sex, sexual orientation, or gender identity.            Thank you!     Thank you for choosing [facility]SE [facility] TH[facility]  for your care. Our goal is always to provide you with excellent care. Hearing back from our patients is one way we can continue to improve our services. Please take a few minutes to complete the written survey that you may receive in the mail after your visit with us. Thank you!             Your Updated Medication List - Protect others around you: Learn how to safely use, store and throw away your medicines at www.disposemymeds.org.          This list is accurate as of 3/1/18  4:32 PM.  Always use your most recent med  list.                   Brand Name Dispense Instructions for use Diagnosis    PROZAC PO

## 2018-03-02 ENCOUNTER — OFFICE VISIT (OUTPATIENT)
Dept: OTOLARYNGOLOGY | Facility: CLINIC | Age: 43
End: 2018-03-02
Payer: COMMERCIAL

## 2018-03-02 ENCOUNTER — RADIANT APPOINTMENT (OUTPATIENT)
Dept: CT IMAGING | Facility: CLINIC | Age: 43
End: 2018-03-02
Attending: OTOLARYNGOLOGY
Payer: COMMERCIAL

## 2018-03-02 VITALS — HEIGHT: 65 IN | BODY MASS INDEX: 24.16 KG/M2 | WEIGHT: 145 LBS

## 2018-03-02 DIAGNOSIS — H69.93 DYSFUNCTION OF BOTH EUSTACHIAN TUBES: ICD-10-CM

## 2018-03-02 DIAGNOSIS — H69.93 DYSFUNCTION OF BOTH EUSTACHIAN TUBES: Primary | ICD-10-CM

## 2018-03-02 RX ORDER — OFLOXACIN 3 MG/ML
SOLUTION AURICULAR (OTIC)
Qty: 5 ML | Refills: 0 | Status: SHIPPED | OUTPATIENT
Start: 2018-03-02 | End: 2018-06-29

## 2018-03-02 ASSESSMENT — PAIN SCALES - GENERAL: PAINLEVEL: NO PAIN (0)

## 2018-03-02 NOTE — NURSING NOTE
"Chief Complaint   Patient presents with     RECHECK     ear fullness, pain . Tube placement      Height 1.66 m (5' 5.35\"), weight 65.8 kg (145 lb).    Richard Montes De Oca LPN    "

## 2018-03-02 NOTE — PROGRESS NOTES
The patient presents with a history of eustacian tube dysfunction and severe pressure in the ears with changes in altitude. She is taking a vacation to [place] next week and she is worried about the discomfort. She has a long history of ear infections as a child. The patient presents for placement of PE tubes in both ears.     All other systems were reviewed and they are either negative or they are not directly pertinent to this Otolaryngology examination.      Past Medical History:    No past medical history on file.    Past Surgical History:    No past surgical history on file.    Medications:      Current Outpatient Prescriptions:      FLUoxetine HCl (PROZAC PO), , Disp: , Rfl:     Allergies:    Sulfa drugs and Bupropion    Physical Examination:    The patient is a well developed, well nourished female in no apparent distress.  She is normocephalic, atraumatic with pupils equally round and reactive to light.    Oral Cavity Examination:  Normal mucosa with no masses or lesions  Nasal Examination:  Normal mucosa with no masses or lesions  Ear Examination: Ear canals clear with no serous or purulent effusions in the middle ear spaces bilaterally.  The  tympanic membranes are scared and retracted. Middle ear spaces are free of effusions and infections  Neurological Examination: Facial nerve function intact and symmetric  Integumentary Examination: No lesions on the skin of the head and neck    Assessment and Plan:    The patient presents with a history of eustacian tube dysfunction and severe discomfort with changes in altitude. We have discussed options of treatment prior to trip scheduled next week. She would like to proceed with bilateral myringotomy and tubes and she will be referred for a CT scan of the temporal bones. She tolerated the placement of PE tubes in both ears without difficulty. She will use floxin drops for two days and she will be seen again in six months.     PreOp Diagnosis:  Chronic Bilateral  Eustacian Tube Dysfunction    PostOp Diagnosis: Chonic Bilateral Eustacian Tube Dysfunction    Procedure:  Bilateral Myringotomy and Tubes    Estimated Blood Loss: Minimal    Complications: None    Consent: The patient was informed of the possible complications and probable outcomes of an operative procedure and the patient gave informed consent.    Fluids: None    Drains: None    Disposition: to recovery, then home    Findings: No effusions    Description of Procedure:    The patient was positioned in the clinic room chair. The patient was prepped and draped in the normal sterile fashion. The patient's head was turned to the right so that the left ear could visualized under the operative microscope. The ear canal was cleaned of cerumen and debris. An incision was made in the anterior, inferior quadrant of the tympanic membrane after the application of phenol topical anesthesia. A #1 Paparella PE tube was placed in the tympanic membrane.The patient's head was turned to the left so that the right ear could visualized under the operative microscope. The ear canal was cleaned of cerumen and debris. An incision was made in the anterior, inferior quadrant of the tympanic membrane after the application of phenol topical anesthesia. A #1 Paparella PE tube was placed in the tympanic membrane.   The patient was informed of the findings of the procedure and given instructions for care. She was discharged to home.

## 2018-03-02 NOTE — LETTER
3/2/2018       RE: Shauna Galdamez  [address] [place] [address]     Dear Colleague,    Thank you for referring your patient, Shauna Galdamez, to the [facility] EAR NOSE AND THROAT at [facility]. Please see a copy of my visit note below.    The patient presents with a history of eustacian tube dysfunction and severe pressure in the ears with changes in altitude. She is taking a vacation to [place] next week and she is worried about the discomfort. She has a long history of ear infections as a child. The patient presents for placement of PE tubes in both ears.     All other systems were reviewed and they are either negative or they are not directly pertinent to this Otolaryngology examination.      Past Medical History:    No past medical history on file.    Past Surgical History:    No past surgical history on file.    Medications:      Current Outpatient Prescriptions:      FLUoxetine HCl (PROZAC PO), , Disp: , Rfl:     Allergies:    Sulfa drugs and Bupropion    Physical Examination:    The patient is a well developed, well nourished female in no apparent distress.  She is normocephalic, atraumatic with pupils equally round and reactive to light.    Oral Cavity Examination:  Normal mucosa with no masses or lesions  Nasal Examination:  Normal mucosa with no masses or lesions  Ear Examination: Ear canals clear with no serous or purulent effusions in the middle ear spaces bilaterally.  The  tympanic membranes are scared and retracted. Middle ear spaces are free of effusions and infections  Neurological Examination: Facial nerve function intact and symmetric  Integumentary Examination: No lesions on the skin of the head and neck    Assessment and Plan:    The patient presents with a history of eustacian tube dysfunction and severe discomfort with changes in altitude. We have discussed options of treatment prior to trip scheduled next week. She would like  to proceed with bilateral myringotomy and tubes and she will be referred for a CT scan of the temporal bones. She tolerated the placement of PE tubes in both ears without difficulty. She will use floxin drops for two days and she will be seen again in six months.     PreOp Diagnosis:  Chronic Bilateral Eustacian Tube Dysfunction    PostOp Diagnosis: Chonic Bilateral Eustacian Tube Dysfunction    Procedure:  Bilateral Myringotomy and Tubes    Estimated Blood Loss: Minimal    Complications: None    Consent: The patient was informed of the possible complications and probable outcomes of an operative procedure and the patient gave informed consent.    Fluids: None    Drains: None    Disposition: to recovery, then home    Findings: No effusions    Description of Procedure:    The patient was positioned in the clinic room chair. The patient was prepped and draped in the normal sterile fashion. The patient's head was turned to the right so that the left ear could visualized under the operative microscope. The ear canal was cleaned of cerumen and debris. An incision was made in the anterior, inferior quadrant of the tympanic membrane after the application of phenol topical anesthesia. A #1 Paparella PE tube was placed in the tympanic membrane.The patient's head was turned to the left so that the right ear could visualized under the operative microscope. The ear canal was cleaned of cerumen and debris. An incision was made in the anterior, inferior quadrant of the tympanic membrane after the application of phenol topical anesthesia. A #1 Paparella PE tube was placed in the tympanic membrane.   The patient was informed of the findings of the procedure and given instructions for care. She was discharged to home.       Again, thank you for allowing me to participate in the care of your patient.      Sincerely,    Wale Ramos MD

## 2018-03-02 NOTE — PATIENT INSTRUCTIONS
The patient presents with a history of eustacian tube dysfunction and severe discomfort with changes in altitude. We have discussed options of treatment prior to trip scheduled next week. She would like to proceed with bilateral myringotomy and tubes and she will be referred for a CT scan of the temporal bones. She tolerated the placement of PE tubes in both ears without difficulty. She will use floxin drops for two days and she will be seen again in six months.

## 2018-03-02 NOTE — MR AVS SNAPSHOT
After Visit Summary   3/2/2018    Shauna Galdamez    MRN: [id number]           Patient Information     Date Of Birth          1975        Visit Information        Provider Department      3/2/2018 7:00 AM Wale Ramos MD [facility] Ear Nose and Throat        Today's Diagnoses     Dysfunction of both eustachian tubes    -  1      Care Instructions    The patient presents with a history of eustacian tube dysfunction and severe discomfort with changes in altitude. We have discussed options of treatment prior to trip scheduled next week. She would like to proceed with bilateral myringotomy and tubes and she will be referred for a CT scan of the temporal bones. She tolerated the placement of PE tubes in both ears without difficulty. She will use floxin drops for two days and she will be seen again in six months.           Follow-ups after your visit        Your next 10 appointments already scheduled     Mar 02, 2018  8:00 PM CST   CT TEMPORAL ORBITAL SELLA W/O CONTRAST with UCCT2   [facility] CT ([facility])    [address] [place] [address] [phone]           Please bring any scans or X-rays taken at other hospitals, if similar tests were done. Also bring a list of your medicines, including vitamins, minerals and over-the-counter drugs. It is safest to leave personal items at home.  Be sure to tell your doctor:   If you have any allergies.   If there s any chance you are pregnant.   If you are breastfeeding.  You do not need to do anything special to prepare for this exam.  Please wear loose clothing, such as a sweat suit or jogging clothes. Avoid snaps, zippers and other metal. We may ask you to undress and put on a hospital gown.            Sep 07, 2018 10:00 AM CDT   (Arrive by 9:45 AM)   Return Visit with Wale Ramos MD   [facility] Ear Nose and Throat ([facility])    [address] [id number] [phone]              Who to contact     Please call your clinic at [phone] to:    Ask questions about your health    Make or cancel appointments    Discuss your medicines    Learn about your test results    Speak to your doctor            Additional Information About Your Visit        MyChart Information     [employer] gives you secure access to your electronic health record. If you see a primary care provider, you can also send messages to your care team and make appointments. If you have questions, please call your primary care clinic.  If you do not have a primary care provider, please call [phone] and they will assist you.      [employer] is an electronic gateway that provides easy, online access to your medical records. With [employer], you can request a clinic appointment, read your test results, renew a prescription or communicate with your care team.     To access your existing account, please contact your [facility] or call [phone] for assistance.        Care EveryWhere ID     This is your Care EveryWhere ID. This could be used by other organizations to access your [facility] medical records  [id number]        Your Vitals Were     Height BMI (Body Mass Index)                1.66 m (5' 5.35\") 23.87 kg/m2           Blood Pressure from Last 3 Encounters:   11/30/15 115/78    Weight from Last 3 Encounters:   03/02/18 65.8 kg (145 lb)   11/30/15 67.1 kg (148 lb)              We Performed the Following     TYMPANOSTOMY W/PE TUBE          Today's Medication Changes          These changes are accurate as of 3/2/18  7:21 AM.  If you have any questions, ask your nurse or doctor.               Start taking these medicines.        Dose/Directions    ofloxacin 0.3 % otic solution   Commonly known as:  FLOXIN   Used for:  Dysfunction of both eustachian tubes   Started by:  Wale Ramos MD        2 drops each " ear BID x 3 days   Quantity:  5 mL   Refills:  0            Where to get your medicines      These medications were sent to [facility]/pharmacy #[id number] - [place], [place] - [address]  [address], [place] [facility] [address]     Phone:  [phone]     ofloxacin 0.3 % otic solution                Primary Care Provider    None Specified       No primary provider on file.        Equal Access to Services     : Hadii aad ku hadasho Soomaali, waaxda luqadaha, qaybta kaalmada adeegyada, waxay idiin hayaan adeeg kharash laLauraaan . So [facility] [phone].    ATENCIÓN: Si habla español, tiene a dean disposición servicios gratuitos de asistencia lingüística. Jigneshgregorio al [phone].    We comply with applicable federal civil rights laws and [place] laws. We do not discriminate on the basis of race, color, national origin, age, disability, sex, sexual orientation, or gender identity.            Thank you!     Thank you for choosing [facility] EAR NOSE AND THROAT  for your care. Our goal is always to provide you with excellent care. Hearing back from our patients is one way we can continue to improve our services. Please take a few minutes to complete the written survey that you may receive in the mail after your visit with us. Thank you!             Your Updated Medication List - Protect others around you: Learn how to safely use, store and throw away your medicines at www.disposemymeds.org.          This list is accurate as of 3/2/18  7:21 AM.  Always use your most recent med list.                   Brand Name Dispense Instructions for use Diagnosis    ofloxacin 0.3 % otic solution    FLOXIN    5 mL    2 drops each ear BID x 3 days    Dysfunction of both eustachian tubes       PROZAC PO

## 2018-06-19 ENCOUNTER — OFFICE VISIT (OUTPATIENT)
Dept: PSYCHOLOGY | Facility: CLINIC | Age: 43
End: 2018-06-19
Payer: COMMERCIAL

## 2018-06-19 DIAGNOSIS — F41.9 ANXIETY DISORDER, UNSPECIFIED TYPE: Primary | ICD-10-CM

## 2018-06-19 DIAGNOSIS — F33.0 MILD EPISODE OF RECURRENT MAJOR DEPRESSIVE DISORDER (H): ICD-10-CM

## 2018-06-19 NOTE — MR AVS SNAPSHOT
After Visit Summary   6/19/2018    Shauna Galdamez    MRN: [id number]           Patient Information     Date Of Birth          1975        Visit Information        Provider Department      6/19/2018 10:00 AM Amaya Alves, PhD  [facility]         Today's Diagnoses     Anxiety disorder, unspecified type    -  1    Mild episode of recurrent major depressive disorder (H)           Follow-ups after your visit        Your next 10 appointments already scheduled     Jun 29, 2018  9:20 AM CDT   New Patient Visit with Martínez Vital MD   [facility] ([facility])    [facility] [address]  [address]  [address]  [facility] [address]  [facility] [address]   [phone]            Jul 24, 2018  8:00 AM CDT   Return Visit with Amaya Alves, PhD    [facility]  ([facility])    [facility]  [address]  [address]  [facility] [address]   [phone]            Jul 31, 2018 11:00 AM CDT   Return Visit with Amaya Alves, PhD    [facility]  ([facility])    [facility]  [address]  [address]  [facility] [address]   [phone]            Aug 28, 2018  8:00 AM CDT   Return Visit with Amaya Alves, PhD    [facility]  ([facility])    [facility]  [address]  [address]  [facility] [address]   [phone]            Sep 04, 2018 11:00 AM CDT   Return Visit with Amaya Alves, PhD    [facility]  ([facility])    [facility]  [address]  [address]  [facility] [address]   [phone]            Sep 07, 2018 10:00 AM CDT   (Arrive by 9:45 AM)   Return Visit with Wale Ramos MD   [facility] Ear Nose and Throat ([facility] [place])    [address] [place] [address]  Street [address]  4th Floor  [facility] [address]   [phone]            Sep 11, 2018 11:00 AM CDT   Return Visit with Amaya Alves, PhD    [facility]  ([facility])    [facility]  [address]rd Fl[address], [address]  [address]  [facility] [address]   [phone]            Sep 18, 2018 11:00 AM CDT   Return Visit with Amaya Alves, PhD    [facility]  ([facility])    [facility]  [address]rd [address], [address]  [address]  [facility] [address]   [phone]            Sep 25, 2018 11:00 AM CDT   Return Visit with Amaya Alves, PhD    [facility]  ([facility])    [facility]  [address]rd [address], [address]  [address]  [facility] [address] [phone]            Oct 02, 2018 11:00 AM CDT   Return Visit with Amaya Alves, PhD    [facility]  ([facility])    [facility]  [address]rd Fl[address], [address]  [address]  [facility] [id number] [phone]              Who to contact     Please call your clinic at [phone] to:    Ask questions about your health    Make or cancel appointments    Discuss your medicines    Learn about your test results    Speak to your doctor            Additional Information About Your Visit        [employer] Information     [employer] gives you secure access to your electronic health record. If you see a primary care provider, you can also send messages to your care team and make appointments. If you have questions, please call your primary care clinic.  If you do not have a primary care provider, please call [phone] and they will assist you.      [employer] is an electronic gateway that provides easy, online access to your medical records. With [employer], you can request a clinic appointment, read your test results, renew a prescription or communicate with your care team.     To access your  existing account, please contact your [facility] or call [phone] for assistance.        Care EveryWhere ID     This is your Care EveryWhere ID. This could be used by other organizations to access your [place] medical records  [id number]         Blood Pressure from Last 3 Encounters:   11/30/15 115/78    Weight from Last 3 Encounters:   03/02/18 65.8 kg (145 lb)   11/30/15 67.1 kg (148 lb)              We Performed the Following     New Patient Visit - Psychiatric diagnostic interview examination ([address])        Primary Care Provider    None Specified       No primary provider on file.        Equal Access to Services     [facility]: Hadii anat de luna hadasho Soomaali, waaxda luqadaha, qajazmín kaalmaalanna marino, deven mi . So [facility] [phone].    ATENCIÓN: Si habla español, tiene a dean disposición servicios gratuitos de asistencia lingüística. Llame al [phone].    We comply with applicable federal civil rights laws and [place] laws. We do not discriminate on the basis of race, color, national origin, age, disability, sex, sexual orientation, or gender identity.            Thank you!     Thank you for choosing [facility]   for your care. Our goal is always to provide you with excellent care. Hearing back from our patients is one way we can continue to improve our services. Please take a few minutes to complete the written survey that you may receive in the mail after your visit with us. Thank you!             Your Updated Medication List - Protect others around you: Learn how to safely use, store and throw away your medicines at www.disposemymeds.org.          This list is accurate as of 6/19/18 11:59 PM.  Always use your most recent med list.                   Brand Name Dispense Instructions for use Diagnosis    ofloxacin 0.3 % otic solution    FLOXIN    5 mL    2 drops each ear BID x 3 days    Dysfunction of both  eustachian tubes       PROZAC PO

## 2018-06-27 NOTE — PROGRESS NOTES
Name:  Shauna Galdamez  Mrn: [id number]  Date of visit: 2018    OUTPATIENT PSYCHOLOGICAL ASSESSMENT VISIT      REFERRAL SOURCE: Colleague/friend      Limits of confidentiality    Risks and benefits of treatment    The goals and procedures of the visit    IDENTIFYING INFORMATION: Shauna Galdamez is a 43yo  woman reporting feeling depressed and  very stressed out my brain is shutting down.     History of Present Illness:  Ms Galdamez reported that she is experiencing  a huge uptick in anxiety  with associated insomnia and  shaking inside.   She identified psychosocial stressors including behavioral difficulties with 3yo ( she is in fight or flight mode a lot wailing, hitting, kicking, biting ), mom s  episodes of rage really demanding,  mother in law diagnosed with terminal cancer (Spring 2018).   She has not engaged in psychotherapy since 2004 because she believed that indicating this information on adoption forms would result in negative evaluation of her application.  All her children are adopted and she completed last adoption in .  She is presenting to this visit to initiate psychotherapy.     Social History:  in  and 3 children were adopted from [place]: 13yo son, 8yo son, 3yo daughter.  Family now complete.       Ms. Galdamez has worked as an  member at [employer] and [employer].  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      Sexual and relationship history:  Time did not permit for review.  Described marital relationship as  pretty solid.        Abuse and Trauma History:  Time did not permit for review    Family Mental Health History:  Brother  alcoholic  and  disappeared,  then  secondary to alcoholism, .  Dad  alcoholic  and  sobered up  in .  Mom  going crazy  with anxiety,  personality changes and episodes of rage.       Mental Health History:  Mid 20s,  depression, treated with Prozac.  2004 - 3 sessions psychotherapy to cope with emotions around brother  disappearing.   Did not continue with therapy since that time due to belief that including this information on adoption applications would impact her ability to adopt.  Taking fluoxetine for 20yrs or more, in Spring 2018 increased from 20 to 40mg.      Current Psychotropic Medications: fluoxetine, 40mg/day     Substance Use History:    Time did not permit for review    Medical History:  Time did not permit for complete review; menstrual irregularities and wondering if she is perimenopausal, has not sought medical evaluation.    Psychological Symptoms:  Depression?  absolutely.   Uncertain when episode began but significant by March 2018.  Anhedonia significantly impacting work function,  I didn t want to get up and go to work.   Eating -  ok  and following [employer] program (shakes as meal replacement) and noted history of problems with overweight.  Insomnia and noted needing at least 8hrs/night.  Energy improving, very low in Spring.  Self esteem lower in Spring and improving in June.  Anxiety increasing to very high in March,  I can t let go of worry;  with associated insomnia, poor concentration,  racing mind,   shaking on the inside.     Denied suicidal or homicidal ideation intent or plan.        Mental Status: Presented as casually dressed and groomed and shook psychologist s hand twice in greeting.  Her legs were crossed as she sat and twisted around each other.  Speech was of normal tone rate and volume.  Affect quite bright throughout visit with brief moments of tears around her stressors.  Eye contact was within normal limits. Formal cognitive assessment was not conducted.  Thoughts tangential at times as she gave much information on a variety of topics.  Thoughts were logical.  Ms. Galdamez was oriented to time, person, place and situation. Recent and remote memory appeared intact. No concentration  difficulties evident in context of visit; however, she identified increasing difficulty with concentration.  Fund of knowledge not assessed but appeared consistent with educational achievement. Judgment appeared good.   Strengths: job, housing, long-term  solid  marital relationship.    Multiaxial Diagnoses:   Axis I:  Unspecified anxiety disorder (r/o SUKHI). MDD recurrent mild.        Axis II: Deferred   Axis III: may be perimenopausal  Axis IV:  None  Axis V: GAF = 59 (present)    Impressions & Plan:  Shauna Galdamez is a 42 year-old,  woman presenting with chronic difficulties with depression and anxiety with heightening of symptoms since beginning of 2018.  Fluoxetine recently increased from 20-40mg.  She is reporting some improvement in mood in past couple of weeks; however anxiety continues,  I can t let go of anxiety and worry.   She attributed increase in symptoms to psychosocial stressors: including behavioral difficulties with 3yo ( she is in fight or flight mode a lot wailing, hitting, kicking, biting ), mom s  episodes of rage really demanding,  mother in law diagnosed with terminal cancer (Spring 2018).  She noted problems with anhedonia and sense of  shaking on the inside  with anxiety.  Her affect appeared overly bright with 2 episodes of brief tears, brightness was somewhat intense and appeared to be an attempt to compensate for dysphoria.     Feedback provided and recommendation for psychotherapy.  Encouraged consideration of re-evaluation of psychiatric medications as anxiety symptoms not improving and depression only mildly improved.  Also provided information regarding scheduling with this provider and the option of obtaining referrals to other therapists.  She stated understanding and plan to follow-up with this psychologist.      Total time spent = 55 minutes psychological assessment

## 2018-06-29 ENCOUNTER — OFFICE VISIT (OUTPATIENT)
Dept: FAMILY MEDICINE | Facility: CLINIC | Age: 43
End: 2018-06-29
Attending: FAMILY MEDICINE
Payer: COMMERCIAL

## 2018-06-29 VITALS
BODY MASS INDEX: 23.97 KG/M2 | HEIGHT: 65 IN | DIASTOLIC BLOOD PRESSURE: 77 MMHG | HEART RATE: 73 BPM | WEIGHT: 143.9 LBS | SYSTOLIC BLOOD PRESSURE: 110 MMHG

## 2018-06-29 DIAGNOSIS — Z12.31 ENCOUNTER FOR SCREENING MAMMOGRAM FOR BREAST CANCER: Primary | ICD-10-CM

## 2018-06-29 DIAGNOSIS — F41.1 GENERALIZED ANXIETY DISORDER: ICD-10-CM

## 2018-06-29 DIAGNOSIS — N93.9 ABNORMAL UTERINE BLEEDING UNRELATED TO MENSTRUAL CYCLE: ICD-10-CM

## 2018-06-29 DIAGNOSIS — Z13.220 LIPID SCREENING: ICD-10-CM

## 2018-06-29 LAB
CHOLEST SERPL-MCNC: 168 MG/DL
DEPRECATED CALCIDIOL+CALCIFEROL SERPL-MC: 34 UG/L (ref 20–75)
HDLC SERPL-MCNC: 68 MG/DL
LDLC SERPL CALC-MCNC: 86 MG/DL
NONHDLC SERPL-MCNC: 100 MG/DL
TRIGL SERPL-MCNC: 68 MG/DL
TSH SERPL DL<=0.005 MIU/L-ACNC: 1.96 MU/L (ref 0.4–4)

## 2018-06-29 PROCEDURE — 80061 LIPID PANEL: CPT | Performed by: FAMILY MEDICINE

## 2018-06-29 PROCEDURE — 84443 ASSAY THYROID STIM HORMONE: CPT | Performed by: FAMILY MEDICINE

## 2018-06-29 PROCEDURE — 82306 VITAMIN D 25 HYDROXY: CPT | Performed by: FAMILY MEDICINE

## 2018-06-29 PROCEDURE — G0463 HOSPITAL OUTPT CLINIC VISIT: HCPCS | Mod: ZF

## 2018-06-29 PROCEDURE — 36415 COLL VENOUS BLD VENIPUNCTURE: CPT | Performed by: FAMILY MEDICINE

## 2018-06-29 ASSESSMENT — ANXIETY QUESTIONNAIRES
7. FEELING AFRAID AS IF SOMETHING AWFUL MIGHT HAPPEN: NOT AT ALL
1. FEELING NERVOUS, ANXIOUS, OR ON EDGE: NOT AT ALL
GAD7 TOTAL SCORE: 0
5. BEING SO RESTLESS THAT IT IS HARD TO SIT STILL: NOT AT ALL
2. NOT BEING ABLE TO STOP OR CONTROL WORRYING: NOT AT ALL
6. BECOMING EASILY ANNOYED OR IRRITABLE: NOT AT ALL
3. WORRYING TOO MUCH ABOUT DIFFERENT THINGS: NOT AT ALL

## 2018-06-29 ASSESSMENT — PATIENT HEALTH QUESTIONNAIRE - PHQ9: 5. POOR APPETITE OR OVEREATING: NOT AT ALL

## 2018-06-29 NOTE — PROGRESS NOTES
"Pt. Here to establish care and CPE    1. Anxiety and mood  Since at least her 20's, more low mood and increasingly anxiety. At first, during dissertation, crying all the time. Saw MD, found to have low thyroid (T4 and TSH) but also responded very well to low dose Prozac. Has remained on that x 20 years. Thyroid tests went back to normal on their own  Symptoms generally stable until Spring 2018, when developed family issues (mother and mother in law with health issues, children with issues). At this time, symptoms included not wanting to get out of bed, trouble staying asleep due to worry, shaking on in the inside.   Started with therapist, after  2 weeks, increased Prozac from 20 to 40 mg.  From April to , had no benefit.  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 Irregular menses/breakthrough bleeding  Menses regular until 2018, usually last 3 days, no heavy flow, mild cramping. Now lasting 2-3 days, and experiencing breakthrough bleeding in middle of cycle, very light bleeding 2-5 days. Changes have occurred monthly  for last 6 months. No hot flashes or night sweats. Mild vaginal dryness;    3. Gyne--Last Pap , all normal,  at [place], HPV cotest?  . No vaginal symptoms. No urinary symptoms.   Currently sexualy active, male partner,  has been only partner, and has zero sperm motility, patient had \"old ovaries\"  No STI, HIV test in past negative. Not vaccinated Hep B. No hx of sexual abuse    3. Breast--no concerns; had mammograms; last . due    4. HCM--dtaP  lipid  nl, glucose  nl      PMH   multiple surgeries L ankle broken   R multpole R ankls broken  Staph infection in L ankle  No evidence osteoporosis per " "surgeon    FH  Mother--migraines, anxiety, dementia,   Father--thyroid ca, ETOH  Sibs--anxiety, ETOH  (brother)  m gmother--CAD  Aunt--lung ca  mgfather-COPD  pgrandparents d. 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  TMJ surgery numb in area  Occ. Leg numbness  12 point ROS negative except where noted above      PE  Blood pressure 110/77, pulse 73, height 1.651 m (5' 5\"), weight 65.3 kg (143 lb 14.4 oz), last menstrual period 06/08/2018, not currently breastfeeding.  Body mass index is 23.95 kg/(m^2).  Constitutional: Well appearing woman in no acute distress.   Psychological: appropriate mood.  Eyes: anicteric, normal extra-ocular movements,  pupils are equal and reactive to light.   Ears, Nose and Throat: tympanic membranes clear, nose clear and free of lesions, throat clear, moist mucous membrames, neck supple with full range of motion.    Neck: No thyroidmegaly. No jugular venous distension, no carotid bruits.  Cardiovascular: regular rate and rhythm, normal S1 and S2, no murmurs, rubs or gallops, peripheral pulses full and symmetric   Respiratory: clear to auscultation, no wheezes or crackles, normal breath sounds.  Breast: not indicated by USPSTF guidelines.   Gastrointestinal: positive bowel sounds, nontender, no hepatosplenomegaly, no masses. No guarding or rebound.  Genitourinary: External genitalia is normal appearance. No enlargement of the Bartholin or [place] glands. Urethra and bladder are non-tender. Vagina is without lesions or discharge. Normal epithelium, no anterior or posterior wall defects. Cervix is smooth, without lesions, no cervical motion tenderness. Uterus is normal size, shape, and contour. Adnexa without tenderness or enlarged. Perineum without lesions.   Lymphatic: no lymphadenopathy.  Musculoskeletal: full range of motion, no edema and motor " strength is equal in the upper and lower extremities    Skin: no concerning lesions, no jaundice.  Neurological: cranial nerves intact, normal strength and sensation, reflexes at patella and biceps normal, normal gait, no tremor.   Monofilament Foot Exam: n/a  A/P  1. HCM: mammogram, to get records from [place] to see if HPV cotest done, but likely due next pap in 2021.   Lipid screening, up to date on immunizations  2. Anxiety and depression--Will check Vitamin D, TSH with reflex, currently in remission on Prozac and mind/body techniques. Discussed increase in physical activity, and monitor for increase in symptoms when school year starts.   3. Midcycle bleeding--Possible perimenopause, counseled patient regarding midcycle bleeding, will get pelvic ultrasound and TSH as above, consider treatment options if persists and bothersome.       F/u fall 2018 and prn

## 2018-06-29 NOTE — LETTER
"2018       RE: Shauna Galdamez  [address] [place] [address]     Dear Colleague,    Thank you for referring your patient, Shauna Galdamez, to the [facility] at [facility]. Please see a copy of my visit note below.    Pt. Here to establish care and CPE    1. Anxiety and mood  Since at least her 20's, more low mood and increasingly anxiety. At first, during dissertation, crying all the time. Saw MD, found to have low thyroid (T4 and TSH) but also responded very well to low dose Prozac. Has remained on that x 20 years. Thyroid tests went back to normal on their own  Symptoms generally stable until Spring 2018, when developed family issues (mother and mother in law with health issues, children with issues). At this time, symptoms included not wanting to get out of bed, trouble staying asleep due to worry, shaking on in the inside.   Started with therapist, after  2 weeks, increased Prozac from 20 to 40 mg.  From April to , had no benefit.  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 Irregular menses/breakthrough bleeding  Menses regular until 2018, usually last 3 days, no heavy flow, mild cramping. Now lasting 2-3 days, and experiencing breakthrough bleeding in middle of cycle, very light bleeding 2-5 days. Changes have occurred monthly  for last 6 months. No hot flashes or night sweats. Mild vaginal dryness;    3. Gyne--Last Pap , all normal,  at [place], HPV cotest?  . No vaginal symptoms. No urinary symptoms.   Currently sexualy active, male partner,  has been only partner, and has zero sperm motility, patient had \"old ovaries\"  No STI, HIV test in past " "negative. Not vaccinated Hep B. No hx of sexual abuse    3. Breast--no concerns; had mammograms; last 2016. due    4. HCM--dtaP 2014 lipid 2013 nl, glucose 2013 nl      PMH  2014 multiple surgeries L ankle broken  2016 R multpole R ankls broken  Staph infection in L ankle  No evidence osteoporosis per surgeon    FH  Mother--migraines, anxiety, dementia,   Father--thyroid ca, ETOH  Sibs--anxiety, ETOH  (brother)  m gmother--CAD  Aunt--lung ca  mgfather-COPD  pgrandparents d. 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  TMJ surgery numb in area  Occ. Leg numbness  12 point ROS negative except where noted above      PE  Blood pressure 110/77, pulse 73, height 1.651 m (5' 5\"), weight 65.3 kg (143 lb 14.4 oz), last menstrual period 06/08/2018, not currently breastfeeding.  Body mass index is 23.95 kg/(m^2).  Constitutional: Well appearing woman in no acute distress.   Psychological: appropriate mood.  Eyes: anicteric, normal extra-ocular movements,  pupils are equal and reactive to light.   Ears, Nose and Throat: tympanic membranes clear, nose clear and free of lesions, throat clear, moist mucous membrames, neck supple with full range of motion.    Neck: No thyroidmegaly. No jugular venous distension, no carotid bruits.  Cardiovascular: regular rate and rhythm, normal S1 and S2, no murmurs, rubs or gallops, peripheral pulses full and symmetric   Respiratory: clear to auscultation, no wheezes or crackles, normal breath sounds.  Breast: not indicated by USPSTF guidelines.   Gastrointestinal: positive bowel sounds, nontender, no hepatosplenomegaly, no masses. No guarding or rebound.  Genitourinary: External genitalia is normal appearance. No enlargement of the Bartholin or Black glands. Urethra and bladder are non-tender. Vagina is without lesions or discharge. Normal epithelium, no " anterior or posterior wall defects. Cervix is smooth, without lesions, no cervical motion tenderness. Uterus is normal size, shape, and contour. Adnexa without tenderness or enlarged. Perineum without lesions.   Lymphatic: no lymphadenopathy.  Musculoskeletal: full range of motion, no edema and motor strength is equal in the upper and lower extremities    Skin: no concerning lesions, no jaundice.  Neurological: cranial nerves intact, normal strength and sensation, reflexes at patella and biceps normal, normal gait, no tremor.   Monofilament Foot Exam: n/a  A/P  1. HCM: mammogram, to get records from [place] to see if HPV cotest done, but likely due next pap in 2021.   Lipid screening, up to date on immunizations  2. Anxiety and depression--Will check Vitamin D, TSH with reflex, currently in remission on Prozac and mind/body techniques. Discussed increase in physical activity, and monitor for increase in symptoms when school year starts.   3. Midcycle bleeding--Possible perimenopause, counseled patient regarding midcycle bleeding, will get pelvic ultrasound and TSH as above, consider treatment options if persists and bothersome.       F/u fall 2018 and prn      Dear Shauna,   Here are your recent results which are within the expected range--all labs are normal. Please continue with your current plan of care.      Martínez Vital

## 2018-06-29 NOTE — MR AVS SNAPSHOT
After Visit Summary   6/29/2018    Shauna Galdamez    MRN: [id number]           Patient Information     Date Of Birth          1975        Visit Information        Provider Department      6/29/2018 9:20 AM Martínez Vital MD [facility]        Today's Diagnoses     Encounter for screening mammogram for breast cancer    -  1    Lipid screening        Generalized anxiety disorder        Abnormal uterine bleeding unrelated to menstrual cycle           Follow-ups after your visit        Your next 10 appointments already scheduled     Jul 18, 2018  9:45 AM CDT   MA SCREENING DIGITAL BILATERAL with URBCMA1   [facility] ([facility])    [address] [place] [address], [address]  [facility] [address] [phone]           Do not use any powder, lotion or deodorant under your arms or on your breast. If you do, we will ask you to remove it before your exam.  Wear comfortable, two-piece clothing.  If you have any allergies, tell your care team.  Bring any previous mammograms from other facilities or have them mailed to the breast center.            Jul 18, 2018 10:00 AM CDT   [facility]   [facility] ([facility])    [facility] [address]  [address]  [facility] [address]   [phone]            Jul 24, 2018  8:00 AM CDT   Return Visit with Amaya Alves, PhD BHARATH   [facility]  ([facility])    [facility]  [address], [address]  [address]  [facility] [address]   [phone]            Jul 31, 2018 11:00 AM CDT   Return Visit with Amaya Alves, PhD BHARATH   [facility]  ([facility])    [facility]  [address], [address]  [address]  [facility] [address]   [phone]            Aug 28, 2018  8:00 AM CDT   Return Visit with Amaya Alves, PhD BHARATH   [facility]  ([facility])    [facility]  [address], [address]  [address]  [facility] [address] [phone]            Sep 04, 2018 11:00 AM CDT   Return Visit with Amaya Alves, PhD BHARATH   [facility]  ([facility])    [facility]  [address], [address]  [address]  [facility] [address]   [phone]            Sep 07, 2018 10:00 AM CDT   (Arrive by 9:45 AM)   Return Visit with Wale Ramos MD   [facility] ([facility])    [address]  4th Floor  [facility] [address] [phone]            Sep 11, 2018 11:00 AM CDT   Return Visit with Amaya Alves, PhD BHARATH   [facility]  ([facility])    [facility]  [address], [address]  [address]  [facility] [address]   [phone]            Sep 18, 2018 11:00 AM CDT   Return Visit with Amaya Alves, PhD    [facility]  ([facility])    [facility]  [address], [address]  [address]  [facility] [address]   [phone]            Sep 25, 2018 11:00 AM CDT   Return Visit with Amaya Alves, PhD    [facility]  ([facility])    [facility]  [address], [address]  [address]  [facility] [id number] [phone]              Who to contact     Please call your clinic at [phone] to:    Ask questions about your health    Make or cancel appointments    Discuss your medicines    Learn about your test results    Speak to your doctor            Additional Information About Your Visit        MyChart Information     [employer] gives you secure access to your electronic health record. If you see a primary care provider, you can also send messages to your care team and make appointments. If you have questions, please call your primary care clinic.   "If you do not have a primary care provider, please call [phone] and they will assist you.      [employer] is an electronic gateway that provides easy, online access to your medical records. With [employer], you can request a clinic appointment, read your test results, renew a prescription or communicate with your care team.     To access your existing account, please contact your [facility] or call [phone] for assistance.        Care EveryWhere ID     This is your Care EveryWhere ID. This could be used by other organizations to access your [place] medical records  [id number]        Your Vitals Were     Pulse Height Last Period Breastfeeding? BMI (Body Mass Index)       73 1.651 m (5' 5\") 06/08/2018 (Approximate) No 23.95 kg/m2        Blood Pressure from Last 3 Encounters:   06/29/18 110/77   11/30/15 115/78    Weight from Last 3 Encounters:   06/29/18 65.3 kg (143 lb 14.4 oz)   03/02/18 65.8 kg (145 lb)   11/30/15 67.1 kg (148 lb)              We Performed the Following     25- [place]-Vitamin D     Lipid Panel     TSH with free T4 reflex        Primary Care Provider    None Specified       No primary provider on file.        Equal Access to Services     RON VAUGHAN : Jenae Gerard, yoli wagner, hue marino, deven mark. So [facility] [phone].    ATENCIÓN: Si habla español, tiene a dean disposición servicios gratuitos de asistencia lingüística. Llame al [phone].    We comply with applicable federal civil rights laws and [place] laws. We do not discriminate on the basis of race, color, national origin, age, disability, sex, sexual orientation, or gender identity.            Thank you!     Thank you for choosing [facility]  for your care. Our goal is always to provide you with excellent care. Hearing back from our patients is one way we can continue to improve our services. Please take a few " minutes to complete the written survey that you may receive in the mail after your visit with us. Thank you!             Your Updated Medication List - Protect others around you: Learn how to safely use, store and throw away your medicines at www.disposemymeds.org.          This list is accurate as of 6/29/18 11:59 PM.  Always use your most recent med list.                   Brand Name Dispense Instructions for use Diagnosis    PROZAC PO

## 2018-06-30 ASSESSMENT — PATIENT HEALTH QUESTIONNAIRE - PHQ9: SUM OF ALL RESPONSES TO PHQ QUESTIONS 1-9: 1

## 2018-06-30 ASSESSMENT — ANXIETY QUESTIONNAIRES: GAD7 TOTAL SCORE: 0

## 2018-07-03 NOTE — PROGRESS NOTES
Dear Shauna,   Here are your recent results which are within the expected range--all labs are normal. Please continue with your current plan of care.       Martínez Vital MD

## 2018-07-18 ENCOUNTER — RADIANT APPOINTMENT (OUTPATIENT)
Dept: ULTRASOUND IMAGING | Facility: CLINIC | Age: 43
End: 2018-07-18
Attending: FAMILY MEDICINE
Payer: COMMERCIAL

## 2018-07-18 ENCOUNTER — RADIANT APPOINTMENT (OUTPATIENT)
Dept: MAMMOGRAPHY | Facility: CLINIC | Age: 43
End: 2018-07-18
Attending: FAMILY MEDICINE
Payer: COMMERCIAL

## 2018-07-18 DIAGNOSIS — Z12.31 ENCOUNTER FOR SCREENING MAMMOGRAM FOR BREAST CANCER: ICD-10-CM

## 2018-07-18 DIAGNOSIS — N93.9 ABNORMAL UTERINE BLEEDING UNRELATED TO MENSTRUAL CYCLE: ICD-10-CM

## 2018-07-18 PROCEDURE — 77067 SCR MAMMO BI INCL CAD: CPT

## 2018-07-18 PROCEDURE — 76856 US EXAM PELVIC COMPLETE: CPT

## 2018-07-25 NOTE — PROGRESS NOTES
Dear Shauna,   Here are your recent results. You have possible small (1 cm) uterine polyp. This may or may not be contributing to your midcycle bleeding. No other abnormalities were seen, the polyp is not concerning.    Martínez Vital MD

## 2018-07-30 ENCOUNTER — RADIANT APPOINTMENT (OUTPATIENT)
Dept: MAMMOGRAPHY | Facility: CLINIC | Age: 43
End: 2018-07-30
Attending: FAMILY MEDICINE
Payer: COMMERCIAL

## 2018-07-30 DIAGNOSIS — R92.8 ABNORMAL MAMMOGRAM OF RIGHT BREAST: ICD-10-CM

## 2018-07-31 ENCOUNTER — OFFICE VISIT (OUTPATIENT)
Dept: PSYCHOLOGY | Facility: CLINIC | Age: 43
End: 2018-07-31
Payer: COMMERCIAL

## 2018-07-31 DIAGNOSIS — F33.0 MILD EPISODE OF RECURRENT MAJOR DEPRESSIVE DISORDER (H): ICD-10-CM

## 2018-07-31 DIAGNOSIS — F41.9 ANXIETY DISORDER, UNSPECIFIED TYPE: Primary | ICD-10-CM

## 2018-07-31 NOTE — PROGRESS NOTES
"  Name:  Shauna Galdamez  Mrn: [id number]  Date of visit: 7/31/2018    PSYCHOLOGY OUTPATIENT VISIT NOTE       PRESENTING PROBLEMS/SYMPTOMS:    Anxiety increasing to very high in March,  I can t let go of worry;  with associated insomnia, poor concentration,  racing mind,   shaking on the inside.   Depressed mood., uncertain when episode began but significant by March 2018.  Anhedonia significantly impacting work function,  I didn t want to get up and go to work.    Insomnia and noted needing at least 8hrs sleep/night.  Energy improving, very low in Spring.  Self esteem lower in Spring and improving.   INTERVENTION AND RESPONSE:  Cognitive Behavioral therapy, individual.  This is patient's first psychotherapy visit following the initial psychological assessment.  Patient presented with updates: Zaria starting play therapy; Gavin (9yo) had knife in room \"so mad he imagined stabbing\" his dad and brought to ED, depression, now on meds and therapy; mom in law - cancer, mom \"nuts.\"  Discussed and effects of stressors.  Taking MBSR class, helpful; letting go of perfectionism.  Recognizing tension in upper body, especially jaw, tense part of diaphragm \"where my heart is,\" tears.  Smiling a lot and \"releases jaw.\"  Ed provided re working with body in therapy.  Pt described TMJ hx, severe, 16yo-24yo, chronic pain.  Fear of pain returning.  Discussed and importance of working to release bodily tension, discussed goals and move towards tx planning next visit.  (goals - better manage worry around kids; better manage relationship with mom (she wants me to be her therapist and best friend, carry her worry); process emotions around brother's death (mental illness); process mom in law dying (cancer)  ASSESSMENT: Engaged in treatment planning.  Laughter describing stressors, etc.  Open to discussion of smiling/laughing when not happy, working with this.  Continuing context: following [employer] program (shakes as meal replacement) " and noted history of problems with overweight. Severe TMJ history.    Mental Status Assessment:  Appearance:   Appropriate   Eye Contact:   Good   Psychomotor Behavior: Normal   Attitude:   Cooperative   Orientation:   All  Speech   Rate / Production: Normal    Volume:  Normal   Mood:    Range: sadness, slightly tearful, some incongruence with laughter.  Thought Content:  Clear   Thought Form:  Coherent  Logical   Insight:    Fair to good    DIAGNOSIS:  Unspecified anxiety disorder (r/o SUKHI). MDD recurrent mild.     PLAN:    Patient to schedule followup visit.       Total time spent equals 45 minutes, individual psychotherapy.

## 2018-07-31 NOTE — MR AVS SNAPSHOT
After Visit Summary   7/31/2018    Shauna Galdamez    MRN: [id number]           Patient Information     Date Of Birth          1975        Visit Information        Provider Department      7/31/2018 11:00 AM Amaya Alves, PhD  [facility]         Today's Diagnoses     Anxiety disorder, unspecified type    -  1    Mild episode of recurrent major depressive disorder (H)           Follow-ups after your visit        Your next 10 appointments already scheduled     Aug 28, 2018  8:00 AM CDT   Return Visit with Amaya Alves, PhD    [facility]  ([facility])    [place] [place] [facility]  [address]rd [address], [address]  [facility] [address]   [phone]            Sep 04, 2018 11:00 AM CDT   Return Visit with Amaya Alves, PhD    [facility]  ([facility])    [place] [place] [facility]  [address]rd [address], [address]  [address]  [facility] [address] [phone]            Sep 07, 2018 10:00 AM CDT   (Arrive by 9:45 AM)   Return Visit with Wale Ramos MD   [facility] ([facility] [place])    [address] [facility]  4th [facility] [address] [phone]            Sep 11, 2018 11:00 AM CDT   Return Visit with Amaya Alves, PhD    [facility]  ([facility])    [facility]  [address]rd [address], [address]  [address]  [facility] [address] [phone]            Sep 18, 2018 11:00 AM CDT   Return Visit with Amaya Alves, PhD    [facility]  ([facility])    [facility]  [address], [address]  [address]  [facility] [address] [phone]            Sep 25, 2018 11:00 AM CDT   Return Visit with Amaya Alves, PhD    [facility]  ([facility])    [facility]  [address]  [facility] [address]   [phone]            Oct 02, 2018 11:00 AM CDT   Return Visit with Amaya Alves, PhD    [facility]  ([facility])    [place] [facility]  [address]rd Flr, [address]  [address]  [facility] [address]   [phone]            Oct 05, 2018  9:20 AM CDT   Return Visit with Martínez Vital MD   [facility] ([facility])    [place] [facility]  [address]  [address]  [address]  [facility] [address]   [phone]            Oct 09, 2018 11:00 AM CDT   Return Visit with Amaya Alves, PhD    [facility]  ([facility])    [facility]  [address]rd [address], [address]  [address]  [facility] [address]   [phone]            Oct 16, 2018 11:00 AM CDT   Return Visit with Amaya Alves, PhD    [facility]  ([facility])    [place] [facility]  [address]rd [address], [address]  [address]  [facility] [address] [phone]              Who to contact     Please call your clinic at [phone] to:    Ask questions about your health    Make or cancel appointments    Discuss your medicines    Learn about your test results    Speak to your doctor            Additional Information About Your Visit        [employer] Information     [employer] gives you secure access to your electronic health record. If you see a primary care provider, you can also send messages to your care team and make appointments. If you have questions, please call your primary care clinic.  If you do not have a primary care provider, please call [phone] and they will assist you.      [employer] is an electronic gateway that provides easy, online access to your medical records. With [employer], you can request a clinic appointment, read your test results, renew a prescription or communicate with your care team.     To access your existing  account, please contact your [facility] or call [phone] for assistance.        Care EveryWhere ID     This is your Care EveryWhere ID. This could be used by other organizations to access your [place] medical records  [id number]         Blood Pressure from Last 3 Encounters:   06/29/18 110/77   11/30/15 115/78    Weight from Last 3 Encounters:   06/29/18 65.3 kg (143 lb 14.4 oz)   03/02/18 65.8 kg (145 lb)   11/30/15 67.1 kg (148 lb)              We Performed the Following     Individual Psychotherapy - Psychotherapy with pt and/or family present  60 mins [53+ mins] (10220)        Primary Care Provider    None Specified       No primary provider on file.        Equal Access to Services     RON VAUGHAN : Jenae Gerard, wayovany wagner, qajazmín kaalmaalanna marino, deven mi . So [facility] [phone].    ATENCIÓN: Si habla español, tiene a dean disposición servicios gratuitos de asistencia lingüística. Llame al [phone].    We comply with applicable federal civil rights laws and [place] laws. We do not discriminate on the basis of race, color, national origin, age, disability, sex, sexual orientation, or gender identity.            Thank you!     Thank you for choosing [facility]   for your care. Our goal is always to provide you with excellent care. Hearing back from our patients is one way we can continue to improve our services. Please take a few minutes to complete the written survey that you may receive in the mail after your visit with us. Thank you!             Your Updated Medication List - Protect others around you: Learn how to safely use, store and throw away your medicines at www.disposemymeds.org.          This list is accurate as of 7/31/18  2:38 PM.  Always use your most recent med list.                   Brand Name Dispense Instructions for use Diagnosis    PROZAC PO

## 2018-08-28 ENCOUNTER — OFFICE VISIT (OUTPATIENT)
Dept: PSYCHOLOGY | Facility: CLINIC | Age: 43
End: 2018-08-28
Payer: COMMERCIAL

## 2018-08-28 DIAGNOSIS — F33.41 RECURRENT MAJOR DEPRESSIVE DISORDER, IN PARTIAL REMISSION (H): ICD-10-CM

## 2018-08-28 DIAGNOSIS — F41.1 GAD (GENERALIZED ANXIETY DISORDER): Primary | ICD-10-CM

## 2018-08-28 NOTE — MR AVS SNAPSHOT
After Visit Summary   8/28/2018    Shauna Galdamez    MRN: [id number]           Patient Information     Date Of Birth          1975        Visit Information        Provider Department      8/28/2018 8:00 AM Amaya Alves, PhD  [facility]         Today's Diagnoses     SUKHI (generalized anxiety disorder)    -  1    Recurrent major depressive disorder, in partial remission (H)           Follow-ups after your visit        Your next 10 appointments already scheduled     Sep 07, 2018 10:00 AM CDT   (Arrive by 9:45 AM)   Return Visit with Wale Ramos MD   [facility] ([facility])    [address] [facility] [address] [facility]  4th Floor  [facility] [address] [phone]            Sep 11, 2018 11:00 AM CDT   Return Visit with Amaya Alves, PhD    [facility]  ([facility])    [facility] [facility]  [address]rd [address], [address]  [address]  [facility] [address]   [phone]            Sep 18, 2018 11:00 AM CDT   Return Visit with Amaya Alves, PhD    [facility]  ([facility])    [facility]  [address]rd [address], [address]  [address]  [facility] [address]   [phone]            Sep 25, 2018 11:00 AM CDT   Return Visit with Amaya Alves, PhD    [facility]  ([facility])    [facility]  [address], [address]  [address]  [facility] [address]   [phone]            Oct 02, 2018 11:00 AM CDT   Return Visit with Amaya Alves, PhD    [facility]  ([facility])    [facility]  [address], [address]  [address]  [facility] [address]   [phone]            Oct 05, 2018  9:20 AM CDT   Return Visit with Martínez Vital MD   [facility] ([facility])    [facility] [facility]  [address]  [address]  [facility] [address]  [facility] [address]   [phone]            Oct 09, 2018 11:00 AM CDT   Return Visit with Amaya Alves, PhD BHARATH   [facility]  ([facility])    [facility]  3rd Flr, [address]  [address]  [facility] [address]   [phone]            Oct 16, 2018 11:00 AM CDT   Return Visit with Amaya Alves, PhD    [facility]  ([facility])    [facility]  3rd Flr, [address]  [address]  [facility] [address]   [phone]            Oct 23, 2018 11:00 AM CDT   Return Visit with Amaya Alves, PhD    [facility]  ([facility])    [facility]  3rd Flr, [address]  [address]  [facility] [address]   [phone]            Oct 30, 2018 11:00 AM CDT   Return Visit with Amaya Alves, PhD    [facility]  ([facility])    [facility]  3rd Flr, [address]  [address]  [facility] [address] [phone]              Who to contact     Please call your clinic at [phone] to:    Ask questions about your health    Make or cancel appointments    Discuss your medicines    Learn about your test results    Speak to your doctor            Additional Information About Your Visit        [employer] Information     [employer] gives you secure access to your electronic health record. If you see a primary care provider, you can also send messages to your care team and make appointments. If you have questions, please call your primary care clinic.  If you do not have a primary care provider, please call [phone] and they will assist you.      [employer] is an electronic gateway that provides easy, online access to your medical records. With [employer], you can request a clinic appointment, read your test results, renew a prescription or communicate with your care team.     To access your  existing account, please contact your [facility] or call [phone] for assistance.        Care EveryWhere ID     This is your Care EveryWhere ID. This could be used by other organizations to access your [place] medical records  [id number]         Blood Pressure from Last 3 Encounters:   06/29/18 110/77   11/30/15 115/78    Weight from Last 3 Encounters:   06/29/18 65.3 kg (143 lb 14.4 oz)   03/02/18 65.8 kg (145 lb)   11/30/15 67.1 kg (148 lb)              We Performed the Following     Individual Psychotherapy - Psychotherapy with pt and/or family present  60 mins [53+ mins] ([address])        Primary Care Provider    None Specified       No primary provider on file.        Equal Access to Services     RON VAUGHAN : Jenae Gerard, wayovany wagner, qaybta kaalmada jamila, deven mi . So [facility] [phone].    ATENCIÓN: Si habla español, tiene a dean disposición servicios gratuitos de asistencia lingüística. [facility] [phone].    We comply with applicable federal civil rights laws and [place] laws. We do not discriminate on the basis of race, color, national origin, age, disability, sex, sexual orientation, or gender identity.            Thank you!     Thank you for choosing [facility]   for your care. Our goal is always to provide you with excellent care. Hearing back from our patients is one way we can continue to improve our services. Please take a few minutes to complete the written survey that you may receive in the mail after your visit with us. Thank you!             Your Updated Medication List - Protect others around you: Learn how to safely use, store and throw away your medicines at www.disposemymeds.org.          This list is accurate as of 8/28/18 11:59 PM.  Always use your most recent med list.                   Brand Name Dispense Instructions for use Diagnosis    PROZAC PO

## 2018-09-04 NOTE — PROGRESS NOTES
"  Name:  Shauna Galdamez  Mrn: [id number]  Date of visit: 2018    PSYCHOLOGY OUTPATIENT VISIT NOTE       PRESENTING PROBLEMS/SYMPTOMS:    Anxiety increasing to very high in March,  I can t let go of worry;  with associated insomnia, poor concentration,  racing mind,   shaking on the inside.   Depressed mood., uncertain when episode began but significant by 2018.  Anhedonia significantly impacting work function,  I didn t want to get up and go to work.    Insomnia and noted needing at least 8hrs sleep/night.  Energy improving, very low in Spring.  Self esteem lower in Spring and improving. [kids = Gavin (11yo), Shaquille (boy) 10yo, Zaria (5yo)]  INTERVENTION AND RESPONSE:  Cognitive Behavioral therapy, individual. Discussed and completed treatment plan (see EMR).  Pt noted concerns re increased weight and related to snacking.  Also awakening at night with worry but also staying awake to have \"my time\", watching TV.  Worries re kids and work.  Also carrying old worry: alcoholism in dad and brother, mental health.   Brother's death then 3mos later adopted toddler, Shaquille now 10yo.  Sense of needing to be \"perfect\" but then having to let this go.  MBSR class completed.  Discussed plan to start working on therapeutic goals identified today.         ASSESSMENT: Engaged in treatment planning.  Laughter describing stressors, etc. Continuing context:  Brother alcoholic and mental health problems,   in .  Dad also alcoholic and \"sober\" since .  Mom  going crazy  with anxiety,  personality changes and episodes of rage.  Mother in law has terminal cancer.  3 kids adopted from [place], behavioral and emotional problems and receiving therapies. Following [employer] program (shakes as meal replacement) and noted history of problems with overweight. Severe TMJ history:  TMJ hx, severe, 12yo-24yo, chronic pain.  Fear of pain returning.  Taking fluoxetine, 40mg/day.    Mental Status " Assessment:  Appearance:   Appropriate   Eye Contact:   Good   Psychomotor Behavior: Normal   Attitude:   Cooperative   Orientation:   All  Speech   Rate / Production: Normal    Volume:  Normal   Mood:    Range: sadness, some incongruence with laughter.  Thought Content:  Clear   Thought Form:  Coherent  Logical   Insight:    Fair to good    DIAGNOSIS:  SUKHI. MDD recurrent in partial remission.     PLAN:    Patient to schedule followup visit.       Total time spent equals 55 minutes, individual psychotherapy.

## 2018-09-07 ENCOUNTER — OFFICE VISIT (OUTPATIENT)
Dept: OTOLARYNGOLOGY | Facility: CLINIC | Age: 43
End: 2018-09-07
Payer: COMMERCIAL

## 2018-09-07 VITALS — WEIGHT: 151 LBS | HEIGHT: 65 IN | BODY MASS INDEX: 25.16 KG/M2

## 2018-09-07 DIAGNOSIS — H69.93 DYSFUNCTION OF BOTH EUSTACHIAN TUBES: Primary | ICD-10-CM

## 2018-09-07 ASSESSMENT — PAIN SCALES - GENERAL: PAINLEVEL: NO PAIN (0)

## 2018-09-07 NOTE — PROGRESS NOTES
The patient presents with a history of eustacian tube dysfunction and severe pressure in the ears with changes in altitude. She is taking a vacation to [place] next week and she is worried about the discomfort. She has a long history of ear infections as a child. The patient reports that she did very well with PE tubes in place, but they have since extruded.      All other systems were reviewed and they are either negative or they are not directly pertinent to this Otolaryngology examination.      Past Medical History:    Past Medical History:   Diagnosis Date     NO ACTIVE PROBLEMS        Past Surgical History:    Past Surgical History:   Procedure Laterality Date     ANKLE SURGERY Bilateral        Medications:      Current Outpatient Prescriptions:      FLUoxetine HCl (PROZAC PO), , Disp: , Rfl:     Allergies:    Sulfa drugs and Bupropion    Physical Examination:    The patient is a well developed, well nourished female in no apparent distress.  She is normocephalic, atraumatic with pupils equally round and reactive to light.    Oral Cavity Examination:  Normal mucosa with no masses or lesions  Nasal Examination:  Normal mucosa with no masses or lesions  Ear Examination: Ear canals clear with no serous or purulent effusions in the middle ear spaces bilaterally.  The  tympanic membranes are scared and retracted. Middle ear spaces are free of effusions and infections. Both tympanic membranes are intact and the right PE tube is no longer present and the left PE tube in removed from the ear canal along with cerumen using an alligator forceps and a microscope.   Neurological Examination: Facial nerve function intact and symmetric  Integumentary Examination: No lesions on the skin of the head and neck    Assessment and Plan:    The patient presents with a history of eustacian tube dysfunction and severe discomfort with changes in altitude. The patient did very well with PE tubes and she would like to explore with our  neurotologists longer lasting PE tubes in both ears such as T-tubes. She will be referred for this consultation.

## 2018-09-07 NOTE — PATIENT INSTRUCTIONS
The patient presents with a history of eustacian tube dysfunction and severe discomfort with changes in altitude. The patient did very well with PE tubes and she would like to explore with our neurotologists longer lasting PE tubes in both ears such as T-tubes. She will be referred for this consultation.

## 2018-09-07 NOTE — LETTER
9/7/2018       RE: Shauna Galdamez  [address] [place] [address]     Dear Colleague,    Thank you for referring your patient, Shauna Galdamez, to the [facility] at [facility]. Please see a copy of my visit note below.    The patient presents with a history of eustacian tube dysfunction and severe pressure in the ears with changes in altitude. She is taking a vacation to [place] next week and she is worried about the discomfort. She has a long history of ear infections as a child. The patient reports that she did very well with PE tubes in place, but they have since extruded.      All other systems were reviewed and they are either negative or they are not directly pertinent to this Otolaryngology examination.      Past Medical History:    Past Medical History:   Diagnosis Date     NO ACTIVE PROBLEMS        Past Surgical History:    Past Surgical History:   Procedure Laterality Date     ANKLE SURGERY Bilateral        Medications:      Current Outpatient Prescriptions:      FLUoxetine HCl (PROZAC PO), , Disp: , Rfl:     Allergies:    Sulfa drugs and Bupropion    Physical Examination:    The patient is a well developed, well nourished female in no apparent distress.  She is normocephalic, atraumatic with pupils equally round and reactive to light.    Oral Cavity Examination:  Normal mucosa with no masses or lesions  Nasal Examination:  Normal mucosa with no masses or lesions  Ear Examination: Ear canals clear with no serous or purulent effusions in the middle ear spaces bilaterally.  The  tympanic membranes are scared and retracted. Middle ear spaces are free of effusions and infections. Both tympanic membranes are intact and the right PE tube is no longer present and the left PE tube in removed from the ear canal along with cerumen using an alligator forceps and a microscope.   Neurological Examination: Facial nerve function intact and  symmetric  Integumentary Examination: No lesions on the skin of the head and neck    Assessment and Plan:    The patient presents with a history of eustacian tube dysfunction and severe discomfort with changes in altitude. The patient did very well with PE tubes and she would like to explore with our neurotologists longer lasting PE tubes in both ears such as T-tubes. She will be referred for this consultation.     Again, thank you for allowing me to participate in the care of your patient.      Sincerely,    Wale Ramos MD

## 2018-09-07 NOTE — MR AVS SNAPSHOT
After Visit Summary   9/7/2018    Shauna Galdamez    MRN: [id number]           Patient Information     Date Of Birth          1975        Visit Information        Provider Department      9/7/2018 10:00 AM Wale Ramos MD [facility] Ear [facility]        Today's Diagnoses     Dysfunction of both eustachian tubes    -  1      Care Instructions    The patient presents with a history of eustacian tube dysfunction and severe discomfort with changes in altitude. The patient did very well with PE tubes and she would like to explore with our neurotologists longer lasting PE tubes in both ears such as T-tubes. She will be referred for this consultation.           Follow-ups after your visit        Your next 10 appointments already scheduled     Sep 11, 2018 11:00 AM CDT   Return Visit with Amaya Alves, PhD    [facility]  ([facility])    [place] [facility]  [address]rd [address], [address]  [facility] [address]   [phone]            Sep 18, 2018 11:00 AM CDT   Return Visit with Amaya Alves, PhD    [facility]  ([facility])    [place] [facility]  [address], [address]  [address]  [facility] [address]   [phone]            Sep 25, 2018 11:00 AM CDT   Return Visit with Amaya Alves, PhD    [facility]  ([facility])    [place] [facility]  [address], [address]  [address]  [facility] [address]   [phone]            Oct 02, 2018 11:00 AM CDT   Return Visit with Amaya Alves, PhD    [facility]  ([facility])    [place] [facility]  [address]  [address]  [facility] [address]   [phone]            Oct 05, 2018  9:20 AM CDT   Return Visit with Martínez Vital MD   [facility] ([facility])    [place] [facility]  [address]rd  [address]  [address]  [facility] [address]  [facility] [address]   [phone]            Oct 09, 2018 11:00 AM CDT   Return Visit with Amaya Alves, PhD    [facility]  ([facility])    [place] [facility]  [address], [address]  [address]  [facility] [address]   [phone]            Oct 16, 2018 11:00 AM CDT   Return Visit with Amaya Alves, PhD    [facility]  ([facility])    [place] [facility]  [address], [address]  [address]  [facility] [address]   [phone]            Oct 23, 2018 11:00 AM CDT   Return Visit with Amaya Alves, PhD    [facility]  ([facility])    [place] [facility]  [address], [address]  [address]  [facility] [address]   [phone]            Oct 30, 2018 11:00 AM CDT   Return Visit with Amaya Alves, PhD    [facility]  ([facility])    [place] [facility]  [address], [address]  [address]  [facility] [address] [phone]              Who to contact     Please call your clinic at [phone] to:    Ask questions about your health    Make or cancel appointments    Discuss your medicines    Learn about your test results    Speak to your doctor            Additional Information About Your Visit        [employer] Information     [employer] gives you secure access to your electronic health record. If you see a primary care provider, you can also send messages to your care team and make appointments. If you have questions, please call your primary care clinic.  If you do not have a primary care provider, please call [phone] and they will assist you.      [employer] is an electronic gateway that provides easy, online access to your medical records. With [employer], you can request a clinic appointment, read your test results, renew a prescription or communicate with your care team.     To  "access your existing account, please contact your [facility] or call [phone] for assistance.        Care EveryWhere ID     This is your Care EveryWhere ID. This could be used by other organizations to access your [facility] medical records  [id number]        Your Vitals Were     Height BMI (Body Mass Index)                1.66 m (5' 5.35\") 24.86 kg/m2           Blood Pressure from Last 3 Encounters:   06/29/18 110/77   11/30/15 115/78    Weight from Last 3 Encounters:   09/07/18 68.5 kg (151 lb)   06/29/18 65.3 kg (143 lb 14.4 oz)   03/02/18 65.8 kg (145 lb)              Today, you had the following     No orders found for display       Primary Care Provider    None Specified       No primary provider on file.        Equal Access to Services     [facility]: Hadii anat Gerard, wayovany wagner, hue kaalmaalanna marino, deven mi . So [facility] [phone].    ATENCIÓN: Si habla español, tiene a dean disposición servicios gratuitos de asistencia lingüística. Carlos al [phone].    We comply with applicable federal civil rights laws and [place] laws. We do not discriminate on the basis of race, color, national origin, age, disability, sex, sexual orientation, or gender identity.            Thank you!     Thank you for choosing [facility] E[facility] AND [facility]AT  for your care. Our goal is always to provide you with excellent care. Hearing back from our patients is one way we can continue to improve our services. Please take a few minutes to complete the written survey that you may receive in the mail after your visit with us. Thank you!             Your Updated Medication List - Protect others around you: Learn how to safely use, store and throw away your medicines at www.disposemymeds.org.          This list is accurate as of 9/7/18 10:11 AM.  Always use your most recent med list.                   Brand Name Dispense Instructions for use " Diagnosis    PROZAC PO

## 2018-09-07 NOTE — NURSING NOTE
"Chief Complaint   Patient presents with     RECHECK     6 month ear check     Height 1.66 m (5' 5.35\"), weight 68.5 kg (151 lb), not currently breastfeeding.    Richard Montes De Oca LPN    "

## 2018-09-11 ENCOUNTER — OFFICE VISIT (OUTPATIENT)
Dept: PSYCHOLOGY | Facility: CLINIC | Age: 43
End: 2018-09-11
Payer: COMMERCIAL

## 2018-09-11 DIAGNOSIS — F33.41 RECURRENT MAJOR DEPRESSIVE DISORDER, IN PARTIAL REMISSION (H): ICD-10-CM

## 2018-09-11 DIAGNOSIS — F41.1 GAD (GENERALIZED ANXIETY DISORDER): Primary | ICD-10-CM

## 2018-09-11 NOTE — PROGRESS NOTES
"  Name:  Shauna Galdamez  Mrn: [id number]  Date of visit: 2018    PSYCHOLOGY OUTPATIENT VISIT NOTE       PRESENTING PROBLEMS/SYMPTOMS:    Anxiety increasing to very high in March,  I can t let go of worry;  with associated insomnia, poor concentration,  racing mind,   shaking on the inside.   Depressed mood., uncertain when episode began but significant by 2018.  Anhedonia significantly impacting work function,  I didn t want to get up and go to work.    Insomnia and noted needing at least 8hrs sleep/night.  Energy improving, very low in Spring.  Self esteem lower in Spring and improving. [kids = Eufemia (13yo), Shaquille (boy) 8yo, Zaria (3yo)]  INTERVENTION AND RESPONSE:  Cognitive Behavioral therapy, individual. 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 Mom in law doing better. Discussed concersn with Eufemia, bullying, talked with principal.  Importance of his therapy and continuing.  Also Eufemia's comments around her at party and finally going to bathroom and vomiting, crying, \"overwhelmed\", talked with H.   Importance of getting assist from eufemia's therapist, also setting limits with Eufemia.  Reviewed goals and edited.    ASSESSMENT: Focused on Eufemia and having some difficulty with setting limits with B's therapist (canceling 2 visits, illness).  B's mental health difficulties may be significant and pt's attempts to \"teach empathy\" as a means of behavior change apparently not working.  Will be important for her to work with his therapist to get recs.  Also her individual work, her own patterns and setting limits. Continuing context:  Brother alcoholic and mental health problems,   in .  Dad also alcoholic and \"sober\" since .  Mom  going crazy  with anxiety,  personality changes and episodes of rage.  Mother in law has terminal cancer.  3 kids adopted from [place], behavioral and emotional problems and receiving therapies. Following [employer] program (shakes as meal replacement) and " noted history of problems with overweight. Severe TMJ history:  TMJ hx, severe, 14yo-24yo, chronic pain.  Fear of pain returning.  Taking fluoxetine, 40mg/day.    Mental Status Assessment:  Appearance:   Appropriate   Eye Contact:   Good   Psychomotor Behavior: Normal   Attitude:   Cooperative   Orientation:   All  Speech   Rate / Production: Normal    Volume:  Normal   Mood:    Range: sadness, tearful discussing issues with Gavin  Thought Content:  Clear   Thought Form:  Coherent  Logical   Insight:    Fair to good    DIAGNOSIS:  SUKHI. MDD recurrent in partial remission.     PLAN:    Patient to schedule followup visit.       Total time spent equals 50 minutes, individual psychotherapy.

## 2018-09-11 NOTE — MR AVS SNAPSHOT
After Visit Summary   9/11/2018    Shauna Galdamez    MRN: [id number]           Patient Information     Date Of Birth          1975        Visit Information        Provider Department      9/11/2018 11:00 AM Amaya Alves, PhD  [facility]         Today's Diagnoses     SUKHI (generalized anxiety disorder)    -  1    Recurrent major depressive disorder, in partial remission (H)           Follow-ups after your visit        Your next 10 appointments already scheduled     Sep 25, 2018 11:00 AM CDT   Return Visit with Amaya Alves, PhD    [facility]  ([facility])    [place] [facility]  [address]rd [address], [address]  [address]  [facility] [address]   [phone]            Oct 02, 2018 11:00 AM CDT   Return Visit with Amaya Alves, PhD    [facility]  ([facility])    [facility] [place] [facility]  [address]  [address]  [facility] [address]   [phone]            Oct 05, 2018  9:20 AM CDT   Return Visit with Martínez Vital MD   [facility] ([facility])    [place] [facility]  [address]  [address]  [facility] [facility] [address]  [facility] [address]   [phone]            Oct 09, 2018 11:00 AM CDT   Return Visit with Amaya Alves, PhD    [facility]  ([facility])    [facility]  [address]  [address]  [facility] [address]   [phone]            Oct 16, 2018 11:00 AM CDT   Return Visit with Amaya Alves, PhD    [facility]  ([facility])    [place] [facility]  [address]rd [address], [address]  [address]  [facility] [address]   [phone]            Oct 23, 2018 11:00 AM CDT   Return Visit with Amaya Alves, PhD    [facility]  ([facility])    [place] [facility]  [address]  [facility] [address]   [phone]            Oct 30, 2018 10:30 AM CDT   (Arrive by 10:15 AM)   NEW NEUROTOLOGY VISIT with Rick L Nissen, MD   [facility] N[facility] ([facility] [place])    [address]  4th [address]  [facility] [address]   [phone]            Nov 09, 2018 10:00 AM CST   Return Visit with Amaya Alves, PhD    [facility]  ([facility])    [place] [facility]  [address]rd [address], [address]  [address]  [facility] [address]   [phone]            Nov 13, 2018  1:00 PM CST   Return Visit with Amaya Alves, PhD    [facility]  ([facility])    [place] [facility]  [address], [address]  [address]  [facility] [address]   [phone]            Nov 27, 2018  1:00 PM CST   Return Visit with Amaya Alves, PhD    [facility]  ([facility])    [place] [employer] [facility]  [address], [address]  [address]  [facility] [address]   [phone]              Who to contact     Please call your clinic at [phone] to:    Ask questions about your health    Make or cancel appointments    Discuss your medicines    Learn about your test results    Speak to your doctor            Additional Information About Your Visit        "[employer] Information     "[employer] gives you secure access to your electronic health record. If you see a primary care provider, you can also send messages to your care team and make appointments. If you have questions, please call your primary care clinic.  If you do not have a primary care provider, please call [phone] and they will assist you.      "[employer] is an electronic gateway that provides easy, online access to your medical records. With "[employer] you can request a clinic appointment, read your test results, renew a prescription or communicate with your care team.     To access your  existing account, please contact your [facility] or call [phone] for assistance.        Care EveryWhere ID     This is your Care EveryWhere ID. This could be used by other organizations to access your [facility] medical records  [id number]         Blood Pressure from Last 3 Encounters:   06/29/18 110/77   11/30/15 115/78    Weight from Last 3 Encounters:   09/07/18 68.5 kg (151 lb)   06/29/18 65.3 kg (143 lb 14.4 oz)   03/02/18 65.8 kg (145 lb)              We Performed the Following     Individual Psychotherapy - Psychotherapy with pt and/or family present 45 mins [38-52 mins] (10274)        Primary Care Provider    None Specified       No primary provider on file.        Equal Access to Services     RON VAUGHAN : Jenae Gerard, wayovany hanadaha, qaybta kaalmada jamila, deven mark. So [facility] [phone].    ATENCIÓN: Si habla español, tiene a dean disposición servicios gratuitos de asistencia lingüística. Ll[facility] [phone].    We comply with applicable federal civil rights laws and [place] laws. We do not discriminate on the basis of race, color, national origin, age, disability, sex, sexual orientation, or gender identity.            Thank you!     Thank you for choosing [facility]   for your care. Our goal is always to provide you with excellent care. Hearing back from our patients is one way we can continue to improve our services. Please take a few minutes to complete the written survey that you may receive in the mail after your visit with us. Thank you!             Your Updated Medication List - Protect others around you: Learn how to safely use, store and throw away your medicines at www.disposemymeds.org.          This list is accurate as of 9/11/18  4:11 PM.  Always use your most recent med list.                   Brand Name Dispense Instructions for use Diagnosis    PROZAC PO

## 2018-09-25 ENCOUNTER — OFFICE VISIT (OUTPATIENT)
Dept: PSYCHOLOGY | Facility: CLINIC | Age: 43
End: 2018-09-25
Payer: COMMERCIAL

## 2018-09-25 DIAGNOSIS — F41.1 GAD (GENERALIZED ANXIETY DISORDER): Primary | ICD-10-CM

## 2018-09-25 DIAGNOSIS — F33.41 RECURRENT MAJOR DEPRESSIVE DISORDER, IN PARTIAL REMISSION (H): ICD-10-CM

## 2018-09-25 NOTE — PROGRESS NOTES
"  Name:  Shauna Galdamez  Mrn: [id number]  Date of visit: 9/11/2018    PSYCHOLOGY OUTPATIENT VISIT NOTE       PRESENTING PROBLEMS/SYMPTOMS:    Anxiety increasing to very high in March,  I can t let go of worry;  with associated insomnia, poor concentration,  racing mind,   shaking on the inside.   Depressed mood., uncertain when episode began but significant by March 2018.  Anhedonia significantly impacting work function,  I didn t want to get up and go to work.    Insomnia and noted needing at least 8hrs sleep/night.  Energy improving, very low in Spring.  Self esteem lower in Spring and improving. [kids = Gavin (13yo), Shaquille (boy) 8yo, Zaria (3yo)]  INTERVENTION AND RESPONSE:  Cognitive Behavioral therapy, individual. Reported on reunion with friends, positive.  Found vape pen in Gavin's bag, talking with him.  Uncertain if she talked with him about boundaries with this, \"what I want\" vs boundary.  Uncertain what boundary is.  Exercise in session: therapist on rolling chair and moving closer, pt noting her feelings, body sensations etc.  With closeness pulling in leg, but telling self \"I'm not uncomfortable, I need to just deal with it.\"  Pt tried (her choice) touching her leg to therapist's leg (at ankle), briefly.  Noted she could do this.  Discussed and importance of boundary, therapist moving back and pt noting \"comfortable\" distance for conversation, also noting when therapist too far away.  Discussed her internal sensations, breath etc when \"comfortable.\"  Importance of boundaries in family relationships and other.  Pt also noted tendency to \"bite my tongue\" and this coming up in current visit.  Plan to work with this more as well.  Pt expressed interest in boundaries and how she identifies this for self.    ASSESSMENT: Pt open and interested in boundary work.  Laughing and smiling when therapist getting closer, may be a way to set a boundary or to discount her own boundary.  Laughter comes up often in this " "pt and usually not in context of happy. Also tendency to bite tongue (with hx of severe TMJ) Continuing context:  Brother alcoholic and mental health problems,   in .  Dad also alcoholic and \"sober\" since .  Mom  going crazy  with anxiety,  personality changes and episodes of rage.  Mother in law has terminal cancer.  3 kids adopted from [place], behavioral and emotional problems and receiving therapies. Following [employer] program (shakes as meal replacement) and noted history of problems with overweight. Severe TMJ history:  TMJ hx, severe, 12yo-26yo, chronic pain.  Fear of pain returning.  Taking fluoxetine, 40mg/day.    Mental Status Assessment:  Appearance:   Appropriate   Eye Contact:   Good   Psychomotor Behavior: Normal   Attitude:   Cooperative   Orientation:   All  Speech   Rate / Production: Normal    Volume:  Normal   Mood:    Range: largely euthymic  Thought Content:  Clear   Thought Form:  Coherent  Logical   Insight:    Fair     DIAGNOSIS:  SUKHI. MDD recurrent in partial remission.     PLAN:    Patient to schedule followup visit.       Total time spent equals 55 minutes, individual psychotherapy.             "

## 2018-09-25 NOTE — MR AVS SNAPSHOT
After Visit Summary   9/25/2018    Shauna Galdamez    MRN: [id number]           Patient Information     Date Of Birth          1975        Visit Information        Provider Department      9/25/2018 11:00 AM Amaya Alves, PhD  [facility]         Today's Diagnoses     SUKHI (generalized anxiety disorder)    -  1    Recurrent major depressive disorder, in partial remission (H)           Follow-ups after your visit        Your next 10 appointments already scheduled     Oct 02, 2018 11:00 AM CDT   Return Visit with Amaya Alves, PhD    [facility]  ([facility])    [place] [facility]  [address]rd [address], [address]  [facility] [address]   [phone]            Oct 05, 2018  9:20 AM CDT   Return Visit with Martínez Vital MD   [facility] ([facility])    [place] [facility] [address]  [address]  [address]  [facility] [address]  [facility] [address]   [phone]            Oct 09, 2018 11:00 AM CDT   Return Visit with Amaya Alves, PhD    [facility]  ([facility])    [place] [facility]  [address], [address]  [address]  [facility] [address]   [phone]            Oct 16, 2018 11:00 AM CDT   Return Visit with Amaya Alves, PhD    [facility]  ([facility])    [place] [facility]  [address], [address]  [address]  [facility] [address]   [phone]            Oct 23, 2018 11:00 AM CDT   Return Visit with Amaya Alves, PhD    [facility]  ([facility])    [place] [facility]  [address], [address]  [address]  [facility] [address]   [phone]            Oct 30, 2018 10:30 AM CDT   (Arrive by 10:15 AM)   NEW NEUROTOLOGY VISIT with Rick L Nissen, MD   [facility] Nose and Throat [facility] [place])    [address] [place] [address]  Street Se  4th Floor  [facility] [address]   [phone]            Nov 09, 2018 10:00 AM CST   Return Visit with Amaya Alves, PhD BHARATH   [facility]  ([facility])    [facility]  3rd Flr, [address]  [address]  [facility] [address]   [phone]            Nov 13, 2018  1:00 PM CST   Return Visit with Amaya Alves, PhD BHARATH   [facility]  ([facility])    [facility]  3rd Flr, [address]  [address]  [facility] [address]   [phone]            Nov 27, 2018  1:00 PM CST   Return Visit with Amaya Alves, PhD    [facility]  ([facility])    [facility]  [address]rd Flr, [address]  [address]  [facility] [address]   [phone]            Dec 04, 2018 10:00 AM CST   Return Visit with Amaya Alves, PhD    [facility]  ([facility])    [facility]  3rd Flr, [address]  [address]  [facility] [address]   [phone]              Who to contact     Please call your clinic at [phone] to:    Ask questions about your health    Make or cancel appointments    Discuss your medicines    Learn about your test results    Speak to your doctor            Additional Information About Your Visit        [employer] Information     [employer] gives you secure access to your electronic health record. If you see a primary care provider, you can also send messages to your care team and make appointments. If you have questions, please call your primary care clinic.  If you do not have a primary care provider, please call [phone] and they will assist you.      [employer] is an electronic gateway that provides easy, online access to your medical records. With [employer], you can request a clinic appointment, read your test results, renew a prescription or communicate with your care team.     To access your  existing account, please contact your [facility] or call [phone] for assistance.        Care EveryWhere ID     This is your Care EveryWhere ID. This could be used by other organizations to access your [place] medical records  [id number]         Blood Pressure from Last 3 Encounters:   06/29/18 110/77   11/30/15 115/78    Weight from Last 3 Encounters:   09/07/18 68.5 kg (151 lb)   06/29/18 65.3 kg (143 lb 14.4 oz)   03/02/18 65.8 kg (145 lb)              We Performed the Following     Individual Psychotherapy - Psychotherapy with pt and/or family present  60 mins [53+ mins] (18891)        Primary Care Provider    None Specified       No primary provider on file.        Equal Access to Services     RON VAUGHAN : Jenae Gerard, wayovany wagner, qaybgaurav kaalmada jamila, deven mi . So [facility] [phone].    ATENCIÓN: Si habla español, tiene a dean disposición servicios gratuitos de asistencia lingüística. [facility] [phone].    We comply with applicable federal civil rights laws and [place] laws. We do not discriminate on the basis of race, color, national origin, age, disability, sex, sexual orientation, or gender identity.            Thank you!     Thank you for choosing [facility]   for your care. Our goal is always to provide you with excellent care. Hearing back from our patients is one way we can continue to improve our services. Please take a few minutes to complete the written survey that you may receive in the mail after your visit with us. Thank you!             Your Updated Medication List - Protect others around you: Learn how to safely use, store and throw away your medicines at www.disposemymeds.org.          This list is accurate as of 9/25/18  5:48 PM.  Always use your most recent med list.                   Brand Name Dispense Instructions for use Diagnosis    PROZAC PO

## 2018-10-05 ENCOUNTER — OFFICE VISIT (OUTPATIENT)
Dept: FAMILY MEDICINE | Facility: CLINIC | Age: 43
End: 2018-10-05
Attending: FAMILY MEDICINE
Payer: COMMERCIAL

## 2018-10-05 VITALS
BODY MASS INDEX: 25.59 KG/M2 | WEIGHT: 153.6 LBS | HEART RATE: 84 BPM | DIASTOLIC BLOOD PRESSURE: 82 MMHG | SYSTOLIC BLOOD PRESSURE: 128 MMHG | HEIGHT: 65 IN

## 2018-10-05 DIAGNOSIS — N92.1 MENOMETRORRHAGIA: ICD-10-CM

## 2018-10-05 DIAGNOSIS — N95.1 PERIMENOPAUSE: ICD-10-CM

## 2018-10-05 DIAGNOSIS — Z23 NEED FOR VACCINATION: ICD-10-CM

## 2018-10-05 DIAGNOSIS — N84.0 ENDOMETRIAL POLYP: Primary | ICD-10-CM

## 2018-10-05 PROCEDURE — 90686 IIV4 VACC NO PRSV 0.5 ML IM: CPT | Mod: ZF

## 2018-10-05 PROCEDURE — 25000128 H RX IP 250 OP 636: Mod: ZF

## 2018-10-05 PROCEDURE — G0463 HOSPITAL OUTPT CLINIC VISIT: HCPCS | Mod: ZF

## 2018-10-05 PROCEDURE — G0008 ADMIN INFLUENZA VIRUS VAC: HCPCS | Mod: ZF

## 2018-10-05 ASSESSMENT — PATIENT HEALTH QUESTIONNAIRE - PHQ9: 5. POOR APPETITE OR OVEREATING: NOT AT ALL

## 2018-10-05 ASSESSMENT — ANXIETY QUESTIONNAIRES
7. FEELING AFRAID AS IF SOMETHING AWFUL MIGHT HAPPEN: NOT AT ALL
3. WORRYING TOO MUCH ABOUT DIFFERENT THINGS: NOT AT ALL
6. BECOMING EASILY ANNOYED OR IRRITABLE: NOT AT ALL
5. BEING SO RESTLESS THAT IT IS HARD TO SIT STILL: NOT AT ALL
GAD7 TOTAL SCORE: 2
1. FEELING NERVOUS, ANXIOUS, OR ON EDGE: SEVERAL DAYS
2. NOT BEING ABLE TO STOP OR CONTROL WORRYING: SEVERAL DAYS

## 2018-10-05 NOTE — PROGRESS NOTES
"HPI  Pt. Here for multiple issues    1. Anxiety and mood--Continues on Prozac 20 mg (?) daily, no side effects. Since last visit, started mindfullness class. Has had some additional life stressors including son with depression. Overall, with mindfullness plus medication provides enough support to stay functional during heightened stress/anxiety provoking events. Able to maintain mindfulness skills at home  Currently able to fall and stay asleep most nights  No panic attacks.  PHQ-9 score:    PHQ-9 SCORE 10/5/2018   Total Score 3   GAD7 score: 2    2. Perimenopauase--Menses still regular, continue shorter, now 1-3 days, spotting now the majority of month, almost everyday.   No hot flashes or night sweats, mild vaginal dryness over last 2 months or so.     ROS  As noted above    Physical Exam  Blood pressure 128/82, pulse 84, height 1.651 m (5' 5\"), weight 69.7 kg (153 lb 9.6 oz), not currently breastfeeding.  Speech RRR, mood is euthrymic no psychomotor changes, thought process is appropriate,   Affect is normal. No evidence of suicidality.     A/P  1. Anxiety--well controlled, continue Prozac at current dose  2. Perimenopause   3. Dysfunctional uterine bleeding  Discussed role of systemtic HRT and vaginal estrogen in perimenopause  Discussed likely causes of uterine bleeding including perimenopausal changes, endometrial polyp, possible hyper or dysplasia.  U/S in 7/2018 had possible endometrial polyp and 10mm lining. Would repeat U/S for further evaluation and refer to ob/gyne for progestin IUD to manage erratic bleeding, or further evaluation if needed.       F/u 6 months CPE  "

## 2018-10-05 NOTE — NURSING NOTE
"Injectable Influenza Immunization Documentation    1.  Has the patient received the information for the injectable influenza vaccine? YES     2. Is the patient 6 months of age or older? YES     3. Does the patient have any of the following contraindications?         Severe allergy to eggs?  No     Severe allergic reaction to previous influenza vaccines?  No   Severe allergy to latex?  No       History of Guillain-[place] syndrome?  No     Currently have a temperature greater than 100.4F?  No        4.  Severely egg allergic patients should have flu vaccine eligibility assessed by an MD, RN, or pharmacist, and those who received flu vaccine should be observed for 15 min by an MD, RN, Pharmacist, Medical Technician, or member of clinic staff.\": YES    5. Latex-allergic patients should be given latex-free influenza vaccine Yes. Please reference the Vaccine latex table to determine if your clinic s product is latex-containing.       Vaccination given by Naina Orantes          "

## 2018-10-05 NOTE — MR AVS SNAPSHOT
After Visit Summary   10/5/2018    Shauna Galdamez    MRN: [id number]           Patient Information     Date Of Birth          1975        Visit Information        Provider Department      10/5/2018 9:20 AM Martínez Vital MD [facility]        Today's Diagnoses     Endometrial polyp    -  1    Menometrorrhagia        Perimenopause        Need for vaccination           Follow-ups after your visit        Additional Services     OB/GYN REFERRAL       Your provider has referred you to:  [facility]: [facility] [phone]   [email or web address]    Pt. Desires progestin IUD to control daily spotting related to perimenopause, possible endometrial polyp.    Please be aware that coverage of these services is subject to the terms and limitations of your health insurance plan.  Call member services at your health plan with any benefit or coverage questions.      Please bring the following with you to your appointment:    (1) Any X-Rays, CTs or MRIs which have been performed.  Contact the facility where they were done to arrange for  prior to your scheduled appointment.   (2) List of current medications   (3) This referral request   (4) Any documents/labs given to you for this referral                  Follow-up notes from your care team     Return in about 6 months (around 4/5/2019) for Physical Exam.      Your next 10 appointments already scheduled     Oct 23, 2018  9:30 AM CDT   US PELVIC COMPLETE W TRANSVAGINAL with [email or web address]   [facility] ([facility])    [facility]  3rd Floor, [address]  [address] [id number] [phone]           How do I prepare for my exam? (Food and drink instructions) Adults: Drink four 8-ounce glasses of fluid an hour before your exam. If you need to empty your bladder before your exam, try  to release only a little urine. Then, drink another glass of fluid.  Children: * Children who are potty trained up to 6 years old should drink at least 2 cups (16 oz) of water/non-carbonated beverage 30 minutes prior to the exam. * Children who are 6-10 years should drink at least 3 cups (24 oz) of water/non-carbonated beverage 45 minutes prior to the exam. * Children who are 10 years or older should drink at least 4 cups (32 oz) of water/non-carbonated beverage 45 minutes prior to the exam.  If your child is very uncomfortable or has an urgent need to pee, please notify a technologist; they will try to find out how much longer the wait may be and provide instructions to help relieve the pressure.  What should I wear: Wear comfortable clothes.  How long does the exam take: Most ultrasounds take 30 to 60 minutes.  What should I bring: Bring a list of your medicines, including vitamins, minerals and over-the-counter drugs. It is safest to leave personal items at home.  Do I need a :  No  is needed.  What do I need to tell my doctor: Tell your doctor about any allergies you may have.  What should I do after the exam: No restrictions, You may resume normal activities.  What is this test: An ultrasound uses sound waves to make pictures of the body. Sound waves do not cause pain. The only discomfort may be the pressure of the wand against your skin or full bladder.  Who should I call with questions: If you have any questions, please call the Imaging Department where you will have your exam. Directions, parking instructions, and other information is available on our website, [email or web address].            Oct 23, 2018 10:00 AM CDT   New Patient Visit with Anahi Daivson MD   [facility] ([facility])    [place] [facility] [address]  [address]rd Flr,[address]  [address]  [facility] [address]   [phone]            Oct 23, 2018 11:00 AM CDT   Return Visit with Amaya Neal  Long, PhD    [facility]  ([facility])    [place] [facility]  3rd Flr, [address]  [address]  [facility] [address]   [phone]            Oct 30, 2018 10:30 AM CDT   (Arrive by 10:15 AM)   NEW NEUROTOLOGY VISIT with Rick L Nissen, MD   [facility] Ear Nose and Throat ([facility] [place])    [address] [facility] [address]  4th Floor  [facility] [address]   [phone]            Nov 09, 2018 10:00 AM CST   Return Visit with Amaya Alves, PhD    [facility]  ([facility])    [place] [facility]  3rd Flr, [address]  [address]  [facility] [address]   [phone]            Nov 13, 2018  1:00 PM CST   Return Visit with Amaya Alves, PhD    [facility]  ([facility])    [place] [facility]  3rd Flr, [address]  [address]  [facility] [address]   [phone]            Nov 27, 2018  1:00 PM CST   Return Visit with Amaya Alves, PhD    [facility]  ([facility])    [place] [facility]  3rd Flr, [address]  [address]  [facility] [address]   [phone]            Dec 04, 2018 10:00 AM CST   Return Visit with Amaya Alves, PhD    [facility]  ([facility])    [place] [facility]  3rd Flr, [address]  [address]  [facility] [address]   [phone]            Dec 11, 2018 10:00 AM CST   Return Visit with Amaya Alves, PhD    [facility]  ([facility])    [place] [facility]  3rd Flr, [address]  [address]  [facility] [address]   [phone]            Dec 18, 2018 11:00 AM CST   Return Visit with Amaya Alves, PhD    [facility]  ([facility])    [place] [facility]  3rd Flr, [address]  [address]  [facility] [address]   [phone]             "  Who to contact     Please call your clinic at [phone] to:    Ask questions about your health    Make or cancel appointments    Discuss your medicines    Learn about your test results    Speak to your doctor            Additional Information About Your Visit        [employer] Information     [employer] gives you secure access to your electronic health record. If you see a primary care provider, you can also send messages to your care team and make appointments. If you have questions, please call your primary care clinic.  If you do not have a primary care provider, please call [phone] and they will assist you.      [employer] is an electronic gateway that provides easy, online access to your medical records. With [employer], you can request a clinic appointment, read your test results, renew a prescription or communicate with your care team.     To access your existing account, please contact your [facility] or call [phone] for assistance.        Care EveryWhere ID     This is your Care EveryWhere ID. This could be used by other organizations to access your [place] medical records  [id number]        Your Vitals Were     Pulse Height BMI (Body Mass Index)             84 1.651 m (5' 5\") 25.56 kg/m2          Blood Pressure from Last 3 Encounters:   10/05/18 128/82   06/29/18 110/77   11/30/15 115/78    Weight from Last 3 Encounters:   10/05/18 69.7 kg (153 lb 9.6 oz)   09/07/18 68.5 kg (151 lb)   06/29/18 65.3 kg (143 lb 14.4 oz)              We Performed the Following     HC FLU VAC PRESRV FREE QUAD SPLIT VIR 3+YRS IM     OB/GYN REFERRAL        Primary Care Provider    None Specified       No primary provider on file.        Equal Access to Services     [facility]: Hadii anat Gerard, yoli wagner, qadeven hanley. So [facility] [phone].    ATENCIÓN: Si habla español, tiene a dean disposición servicios gratuitos de " yamilet lingüísticannette. Carlos al [phone].    We comply with applicable federal civil rights laws and [place] laws. We do not discriminate on the basis of race, color, national origin, age, disability, sex, sexual orientation, or gender identity.            Thank you!     Thank you for choosing [facility]  for your care. Our goal is always to provide you with excellent care. Hearing back from our patients is one way we can continue to improve our services. Please take a few minutes to complete the written survey that you may receive in the mail after your visit with us. Thank you!             Your Updated Medication List - Protect others around you: Learn how to safely use, store and throw away your medicines at www.disposemymeds.org.          This list is accurate as of 10/5/18 11:59 PM.  Always use your most recent med list.                   Brand Name Dispense Instructions for use Diagnosis    PROZAC PO

## 2018-10-05 NOTE — LETTER
"10/5/2018       RE: Shauna Galdamez  [address] [place] [address]     Dear Colleague,    Thank you for referring your patient, Shauna Galdamez, to the [facility] at [facility]. Please see a copy of my visit note below.    HPI  Pt. Here for multiple issues    1. Anxiety and mood--Continues on Prozac 20 mg (?) daily, no side effects. Since last visit, started mindfullness class. Has had some additional life stressors including son with depression. Overall, with mindfullness plus medication provides enough support to stay functional during heightened stress/anxiety provoking events. Able to maintain mindfulness skills at home  Currently able to fall and stay asleep most nights  No panic attacks.  PHQ-9 score:    PHQ-9 SCORE 10/5/2018   Total Score 3   GAD7 score: 2    2. Perimenopauase--Menses still regular, continue shorter, now 1-3 days, spotting now the majority of month, almost everyday.   No hot flashes or night sweats, mild vaginal dryness over last 2 months or so.     Physical Exam  Blood pressure 128/82, pulse 84, height 1.651 m (5' 5\"), weight 69.7 kg (153 lb 9.6 oz), not currently breastfeeding.  Speech RRR, mood is euthrymic no psychomotor changes, thought process is appropriate,   Affect is normal. No evidence of suicidality.     A/P  1. Anxiety--well controlled, continue Prozac at current dose  2. Perimenopause   3. Dysfunctional uterine bleeding  Discussed role of systemtic HRT and vaginal estrogen in perimenopause  Discussed likely causes of uterine bleeding including perimenopausal changes, endometrial polyp, possible hyper or dysplasia.  U/S in 7/2018 had possible endometrial polyp and 10mm lining. Would repeat U/S for further evaluation and refer to ob/gyne for progestin IUD to manage erratic bleeding, or further evaluation if needed.       F/u 6 months CPE    Again, thank you for allowing me to participate in the " care of your patient.      Sincerely,    Martínez Vital MD

## 2018-10-06 ASSESSMENT — ANXIETY QUESTIONNAIRES: GAD7 TOTAL SCORE: 2

## 2018-10-06 ASSESSMENT — PATIENT HEALTH QUESTIONNAIRE - PHQ9: SUM OF ALL RESPONSES TO PHQ QUESTIONS 1-9: 3

## 2018-10-09 ENCOUNTER — OFFICE VISIT (OUTPATIENT)
Dept: PSYCHOLOGY | Facility: CLINIC | Age: 43
End: 2018-10-09
Payer: COMMERCIAL

## 2018-10-09 DIAGNOSIS — F33.41 RECURRENT MAJOR DEPRESSIVE DISORDER, IN PARTIAL REMISSION (H): ICD-10-CM

## 2018-10-09 DIAGNOSIS — F41.1 GAD (GENERALIZED ANXIETY DISORDER): Primary | ICD-10-CM

## 2018-10-09 NOTE — MR AVS SNAPSHOT
After Visit Summary   10/9/2018    Shauna Galdamez    MRN: [id number]           Patient Information     Date Of Birth          1975        Visit Information        Provider Department      10/9/2018 11:00 AM Amaya Alves, PhD BHARATH [facility]         Today's Diagnoses     SUKHI (generalized anxiety disorder)    -  1    Recurrent major depressive disorder, in partial remission (H)           Follow-ups after your visit        Your next 10 appointments already scheduled     Oct 16, 2018 11:00 AM CDT   Return Visit with Amaya Alves, PhD BHARATH   [facility]  ([facility])    [place] [facility]  3rd Flr, [address]  [address] [facility] [address] [phone]            Oct 23, 2018  9:30 AM CDT   US PELVIC COMPLETE W TRANSVAGINAL with [id number]   [facility] ([facility])    [place] [facility]  3rd Floor, [address]  [address] [id number] [phone]           How do I prepare for my exam? (Food and drink instructions) Adults: Drink four 8-ounce glasses of fluid an hour before your exam. If you need to empty your bladder before your exam, try to release only a little urine. Then, drink another glass of fluid.  Children: * Children who are potty trained up to 6 years old should drink at least 2 cups (16 oz) of water/non-carbonated beverage 30 minutes prior to the exam. * Children who are 6-10 years should drink at least 3 cups (24 oz) of water/non-carbonated beverage 45 minutes prior to the exam. * Children who are 10 years or older should drink at least 4 cups (32 oz) of water/non-carbonated beverage 45 minutes prior to the exam.  If your child is very uncomfortable or has an urgent need to pee, please notify a technologist; they will try to find out how much longer the wait may be and provide instructions to help relieve the pressure.  What should I  wear: Wear comfortable clothes.  How long does the exam take: Most ultrasounds take 30 to 60 minutes.  What should I bring: Bring a list of your medicines, including vitamins, minerals and over-the-counter drugs. It is safest to leave personal items at home.  Do I need a :  No  is needed.  What do I need to tell my doctor: Tell your doctor about any allergies you may have.  What should I do after the exam: No restrictions, You may resume normal activities.  What is this test: An ultrasound uses sound waves to make pictures of the body. Sound waves do not cause pain. The only discomfort may be the pressure of the wand against your skin or full bladder.  Who should I call with questions: If you have any questions, please call the Imaging Department where you will have your exam. Directions, parking instructions, and other information is available on our website, [email or web address].            Oct 23, 2018 10:00 AM CDT   New Patient Visit with Anahi Davison MD   [facility] ([facility])    [place] [facility] [address]  [address]  [address] [place] [address]   [phone]            Oct 23, 2018 11:00 AM CDT   Return Visit with Amaya Alves, PhD    [facility]  ([facility])    [place] [facility]  3rd Flr, [address]  [address] [place] [address]   [phone]            Oct 30, 2018 10:30 AM CDT   (Arrive by 10:15 AM)   NEW NEUROTOLOGY VISIT with Rick L Nissen, MD   [facility] ([facility])    [address] [facility]  [address] [facility] [address]   [phone]            Nov 09, 2018 10:00 AM CST   Return Visit with Amaya Alves, PhD    [facility]  ([facility])    [place] [facility]  3rd Flr, [address]  [address] [place] [address] [phone]            Nov 13, 2018  1:00 PM CST    Return Visit with Amaya Alves, PhD BHARATH   [facility]  ([facility])    [place] [facility]  3rd Flr, [address]  [address]  [facility] [address]   [phone]            Nov 27, 2018  1:00 PM CST   Return Visit with Amaya Alves, PhD BHARATH   [facility]  ([facility])    [place] [facility]  3rd Flr, [address]  [address] [facility] [address]   [phone]            Dec 04, 2018 10:00 AM CST   Return Visit with Amaya Alves, PhD BHARATH   [facility]  ([facility])    [place] [facility]  3rd Flr, [address]  [address] [facility] [address] [phone]            Dec 11, 2018 10:00 AM CST   Return Visit with Amaya Alves, PhD BHARATH   [facility]  ([facility])    [facility]  3rd Flr, [address]  [address] [facility] [address]   [phone]              Who to contact     Please call your clinic at [phone] to:    Ask questions about your health    Make or cancel appointments    Discuss your medicines    Learn about your test results    Speak to your doctor            Additional Information About Your Visit        [employer] Information     [employer] gives you secure access to your electronic health record. If you see a primary care provider, you can also send messages to your care team and make appointments. If you have questions, please call your primary care clinic.  If you do not have a primary care provider, please call [phone] and they will assist you.      [employer] is an electronic gateway that provides easy, online access to your medical records. With [employer], you can request a clinic appointment, read your test results, renew a prescription or communicate with your care team.     To access your existing account, please contact your [facility] or call [phone]  for assistance.        Care EveryWhere ID     This is your Care EveryWhere ID. This could be used by other organizations to access your [place] medical records  [id number]         Blood Pressure from Last 3 Encounters:   10/05/18 128/82   06/29/18 110/77   11/30/15 115/78    Weight from Last 3 Encounters:   10/05/18 69.7 kg (153 lb 9.6 oz)   09/07/18 68.5 kg (151 lb)   06/29/18 65.3 kg (143 lb 14.4 oz)              We Performed the Following     Individual Psychotherapy - Psychotherapy with pt and/or family present  60 mins [53+ mins] (72830)        Primary Care Provider    None Specified       No primary provider on file.        Equal Access to Services     RON VAUGHAN : Jenae Gerard, yoli wagner, hue marino, deven mi . So [facility] [phone].    ATENCIÓN: Si habla español, tiene a dean disposición servicios gratuitos de asistencia lingüística. Llame al [phone].    We comply with applicable federal civil rights laws and [place] laws. We do not discriminate on the basis of race, color, national origin, age, disability, sex, sexual orientation, or gender identity.            Thank you!     Thank you for choosing [facility]   for your care. Our goal is always to provide you with excellent care. Hearing back from our patients is one way we can continue to improve our services. Please take a few minutes to complete the written survey that you may receive in the mail after your visit with us. Thank you!             Your Updated Medication List - Protect others around you: Learn how to safely use, store and throw away your medicines at www.disposemymeds.org.          This list is accurate as of 10/9/18  2:48 PM.  Always use your most recent med list.                   Brand Name Dispense Instructions for use Diagnosis    PROZAC PO

## 2018-10-09 NOTE — PROGRESS NOTES
"  Name:  Shauna Galdamez  Mrn: [id number]  Date of visit: 10/9/2018    PSYCHOLOGY OUTPATIENT VISIT NOTE       Treatment plan: 8/28/18  PRESENTING PROBLEMS/SYMPTOMS:    Anxiety increasing to very high in March,  I can t let go of worry;  with associated insomnia, poor concentration,  racing mind,   shaking on the inside.   Depressed mood., uncertain when episode began but significant by March 2018.  Anhedonia significantly impacting work function,  I didn t want to get up and go to work.    Insomnia and noted needing at least 8hrs sleep/night.  Energy improving, very low in Spring.  Self esteem lower in Spring and improving. [kids = Gavin (13yo), Shaquille (boy) 10yo, Zaria (3yo)]  INTERVENTION AND RESPONSE:  Cognitive Behavioral therapy, individual.  Presented with recognizing 2nd missed visit, last week.  Unsure reason, wanting day to be \"Unstructured\"? Needing structure to tie in with visit?  Able to make therapy 3-4x/week for kids' sessions, but not her own.  Importance of making visits and policy around missed visits reviewed.  Pt considering Fri might be better, to consider rescheduling.  Larger issue of \"forgetting\", mom's forgetting and asking mom to get cognitive eval, \"not mattering to mom\" if it causes me stress, \"just focusing on herself,\" mom's \"expectations on me\" and guilt, mom's \"fits and rages.\"  (noted tense upper chest and giggling) anger absolutely, don't go there.  Anger at mom, mom saying \"we haven't been close since you went to grad school\" mom blaming her for not including.  Discussed and boundaries with mom, Gavin, with practice in session last visit.  H valuing \"stoicism\" and pt being stoic.  Her sense of anger \"pretty new, not a key part of childhood.\"  Importance of anger and space to express it.      (previous visit: tendency to \"bite my tongue\" and this coming up in current visit.)   ASSESSMENT: Pt open and interested in boundary work.  Laughing when noting anger. Anger interesting in context " "of tendency to bite tongue (with hx of severe TMJ).  Significant worry around \"memory problem\" e.g. missign visit.  Continuing context:  Brother alcoholic and mental health problems,   in .  Dad also alcoholic and \"sober\" since .  Mom  going crazy  with anxiety,  personality changes and episodes of rage.  Mother in law has terminal cancer.  3 kids adopted from [place], behavioral and emotional problems and receiving therapies. Following [email or web address] program (shakes as meal replacement) and noted history of problems with overweight. Severe TMJ history:  TMJ hx, severe, 12yo-24yo, chronic pain.  Fear of pain returning.  Taking fluoxetine, 40mg/day.    Mental Status Assessment:  Appearance:   Appropriate   Eye Contact:   Good   Psychomotor Behavior: Normal legs crossed and twisted  Attitude:   Cooperative   Orientation:   All  Speech   Rate / Production: Normal    Volume:  Normal   Mood:    Range: largely euthymic, identifying anger with smile and laugh  Thought Content:  Clear   Thought Form:  Coherent  Logical   Insight:    Fair     DIAGNOSIS:  SUKHI. MDD recurrent in partial remission.     PLAN:    Patient to schedule followup visit.       Total time spent equals 55 minutes, individual psychotherapy.             "

## 2018-10-16 ENCOUNTER — OFFICE VISIT (OUTPATIENT)
Dept: PSYCHOLOGY | Facility: CLINIC | Age: 43
End: 2018-10-16
Payer: COMMERCIAL

## 2018-10-16 DIAGNOSIS — F33.41 RECURRENT MAJOR DEPRESSIVE DISORDER, IN PARTIAL REMISSION (H): ICD-10-CM

## 2018-10-16 DIAGNOSIS — F41.1 GAD (GENERALIZED ANXIETY DISORDER): Primary | ICD-10-CM

## 2018-10-16 NOTE — MR AVS SNAPSHOT
After Visit Summary   10/16/2018    Shauna Galdamez    MRN: [id number]           Patient Information     Date Of Birth          1975        Visit Information        Provider Department      10/16/2018 11:00 AM Amaya Alves, PhD LP [facility]         Today's Diagnoses     SUKHI (generalized anxiety disorder)    -  1    Recurrent major depressive disorder, in partial remission (H)           Follow-ups after your visit        Your next 10 appointments already scheduled     Oct 23, 2018  9:30 AM CDT   US PELVIC COMPLETE W TRANSVAGINAL with [email or web address]   [facility] ([facility])    [facility]  3rd Floor, [address]  [address] [facility] [address] [phone]           How do I prepare for my exam? (Food and drink instructions) Adults: Drink four 8-ounce glasses of fluid an hour before your exam. If you need to empty your bladder before your exam, try to release only a little urine. Then, drink another glass of fluid.  Children: * Children who are potty trained up to 6 years old should drink at least 2 cups (16 oz) of water/non-carbonated beverage 30 minutes prior to the exam. * Children who are 6-10 years should drink at least 3 cups (24 oz) of water/non-carbonated beverage 45 minutes prior to the exam. * Children who are 10 years or older should drink at least 4 cups (32 oz) of water/non-carbonated beverage 45 minutes prior to the exam.  If your child is very uncomfortable or has an urgent need to pee, please notify a technologist; they will try to find out how much longer the wait may be and provide instructions to help relieve the pressure.  What should I wear: Wear comfortable clothes.  How long does the exam take: Most ultrasounds take 30 to 60 minutes.  What should I bring: Bring a list of your medicines, including vitamins, minerals and over-the-counter drugs. It is safest to leave personal  items at home.  Do I need a :  No  is needed.  What do I need to tell my doctor: Tell your doctor about any allergies you may have.  What should I do after the exam: No restrictions, You may resume normal activities.  What is this test: An ultrasound uses sound waves to make pictures of the body. Sound waves do not cause pain. The only discomfort may be the pressure of the wand against your skin or full bladder.  Who should I call with questions: If you have any questions, please call the Imaging Department where you will have your exam. Directions, parking instructions, and other information is available on our website, [email or web address].            Oct 23, 2018 10:00 AM CDT   New Patient Visit with Anahi Davison MD   [facility] ([facility])    [place] [facility] [address]  [address]  [address]  [facility] [address]   [phone]            Oct 23, 2018 11:00 AM CDT   Return Visit with Amaya Alves, PhD    [facility]  ([facility]  3rd Flr, [address]  [address]  [facility] [address]   [phone]            Oct 30, 2018 10:30 AM CDT   (Arrive by 10:15 AM)   NEW NEUROTOLOGY VISIT with Rick L Nissen, MD   [facility] ([facility])    [address] [facility]  [address] [facility] [address]   [phone]            Nov 09, 2018 10:00 AM CST   Return Visit with Amaya Alves, PhD    [facility]  ([facility]  3rd Flr, [address]  [address]  [facility] [address]   [phone]            Nov 13, 2018  1:00 PM CST   Return Visit with Amaya Alves, PhD    [facility]  ([facility]  3rd Flr, [address]  [address]  [facility] [address]   [phone]            Nov 27, 2018  1:00 PM  CST   Return Visit with Amaya Alves, PhD BHARATH   [facility]  ([facility])    [place] [facility]  3rd Flr, [address]  [address]  [facility] [address]   [phone]            Dec 04, 2018 10:00 AM CST   Return Visit with Amaya Alves, PhD BHARATH   [facility]  ([facility])    [place] [facility]  [address]rd Flr, [address]  [address] [facility] [address]   [phone]            Dec 11, 2018 10:00 AM CST   Return Visit with Amaya Alves, PhD BHARATH   [facility]  ([facility])    [place] [facility]  [address]rd Flr, [address]  [address] [facility] [address]   [phone]            Dec 18, 2018 11:00 AM CST   Return Visit with Amaya Alves, PhD BHARATH   [facility]  ([facility])    [facility]  3rd Flr, [address]  [address] [facility] [address]   [phone]              Who to contact     Please call your clinic at [phone] to:    Ask questions about your health    Make or cancel appointments    Discuss your medicines    Learn about your test results    Speak to your doctor            Additional Information About Your Visit        [employer] Information     [employer] gives you secure access to your electronic health record. If you see a primary care provider, you can also send messages to your care team and make appointments. If you have questions, please call your primary care clinic.  If you do not have a primary care provider, please call [phone] and they will assist you.      [employer] is an electronic gateway that provides easy, online access to your medical records. With [employer], you can request a clinic appointment, read your test results, renew a prescription or communicate with your care team.     To access your existing account, please contact your [facility] or call  [phone] for assistance.        Care EveryWhere ID     This is your Care EveryWhere ID. This could be used by other organizations to access your [place] medical records  [id number]         Blood Pressure from Last 3 Encounters:   10/05/18 128/82   06/29/18 110/77   11/30/15 115/78    Weight from Last 3 Encounters:   10/05/18 69.7 kg (153 lb 9.6 oz)   09/07/18 68.5 kg (151 lb)   06/29/18 65.3 kg (143 lb 14.4 oz)              We Performed the Following     Individual Psychotherapy - Psychotherapy with pt and/or family present 45 mins [38-52 mins] (84795)        Primary Care Provider    None Specified       No primary provider on file.        Equal Access to Services     RON VAUGHAN : Jenae Gerard, yoli wagner, hue kaalmaalanna marino, deven mi . So [facility] [phone].    ATENCIÓN: Si habla español, tiene a dean disposición servicios gratuitos de asistencia lingüística. [facility] [phone].    We comply with applicable federal civil rights laws and [place] laws. We do not discriminate on the basis of race, color, national origin, age, disability, sex, sexual orientation, or gender identity.            Thank you!     Thank you for choosing [facility]   for your care. Our goal is always to provide you with excellent care. Hearing back from our patients is one way we can continue to improve our services. Please take a few minutes to complete the written survey that you may receive in the mail after your visit with us. Thank you!             Your Updated Medication List - Protect others around you: Learn how to safely use, store and throw away your medicines at www.disposemymeds.org.          This list is accurate as of 10/16/18  4:49 PM.  Always use your most recent med list.                   Brand Name Dispense Instructions for use Diagnosis    PROZAC PO

## 2018-10-16 NOTE — PROGRESS NOTES
"  Name:  Shauna Galdamez  Mrn: [id number]  Date of visit: 10/16/2018    PSYCHOLOGY OUTPATIENT VISIT NOTE       Treatment plan: 18  PRESENTING PROBLEMS/SYMPTOMS:    Anxiety increasing to very high in March,  I can t let go of worry;  with associated insomnia, poor concentration,  racing mind,   shaking on the inside.   Depressed mood., uncertain when episode began but significant by 2018.  Anhedonia significantly impacting work function,  I didn t want to get up and go to work.    Insomnia and noted needing at least 8hrs sleep/night.  Energy improving, very low in Spring.  Self esteem lower in Spring and improving. [kids = Gavin (11yo), Shaquille (boy) 8yo, Zaria (3yo); Bro () = Aldo;  = Derrick]  INTERVENTION AND RESPONSE:  Cognitive Behavioral therapy, individual.  Presented with worry around Btalking with him around grades and B anger at Derrick, breaking picture and threatening to \"choke\" himself by drinking milk.  Worries around B and self harm, discussed and importance of workign with B's therapist, communicating this.  Also pattern in herself with stress: going into head, \"managing\" situation and emotions later; associated anxiety and \"malaise.\"  Hx with mom's high emotions, hiding her own emotions so \"mom won't be sad,\" anger towards mom.  Belief: Love is having no boundaries. Memory of Aldo, never had boundaries and love, best friend, no regrets.  Shaquille's adoption right after J's suicide, H telling her she's \"grieved enough\" and her anger.  Importance of her emotions around all this, plan to further process.    [Previous visit:  H valuing \"stoicism\" and pt being stoic.  Her sense of anger \"pretty new, not a key part of childhood.\"  Importance of anger and space to express it.  previous visit: tendency to \"bite my tongue\" and this coming up in current visit.) ]  ASSESSMENT: Pt linking issues with Gavin and mom and Aldo and , around emotions and boundaries and love.  Also recognizing anger " "(with mom and H).  Continuing context:  Brother alcoholic and mental health problems,   in .  Dad also alcoholic and \"sober\" since .  Mom  going crazy  with anxiety,  personality changes and episodes of rage.  Mother in law has terminal cancer.  3 kids adopted from [place], behavioral and emotional problems and receiving therapies. Following [employer] program (shakes as meal replacement) and noted history of problems with overweight. Severe TMJ history:  TMJ hx, severe, 12yo-22yo, chronic pain.  Fear of pain returning.  Taking fluoxetine, 40mg/day.    Mental Status Assessment:  Appearance:   Appropriate   Eye Contact:   Good   Psychomotor Behavior: Normal   Attitude:   Cooperative   Orientation:   All  Speech   Rate / Production: Normal    Volume:  Normal   Mood:    Range: tearful discussing Aldo  Thought Content:  Clear   Thought Form:  Coherent  Logical   Insight:    Fair     DIAGNOSIS:  SUKHI. MDD recurrent in partial remission.     PLAN:    Patient to schedule followup visit.       Total time spent equals 50 minutes, individual psychotherapy.             "

## 2018-10-30 ENCOUNTER — PRE VISIT (OUTPATIENT)
Dept: OTOLARYNGOLOGY | Facility: CLINIC | Age: 43
End: 2018-10-30

## 2018-10-30 ENCOUNTER — OFFICE VISIT (OUTPATIENT)
Dept: OTOLARYNGOLOGY | Facility: CLINIC | Age: 43
End: 2018-10-30
Payer: COMMERCIAL

## 2018-10-30 DIAGNOSIS — H69.93 ETD (EUSTACHIAN TUBE DYSFUNCTION), BILATERAL: ICD-10-CM

## 2018-10-30 DIAGNOSIS — H74.09: ICD-10-CM

## 2018-10-30 DIAGNOSIS — H61.23 EXCESSIVE CERUMEN IN BOTH EAR CANALS: Primary | ICD-10-CM

## 2018-10-30 ASSESSMENT — PAIN SCALES - GENERAL: PAINLEVEL: NO PAIN (0)

## 2018-10-30 NOTE — PATIENT INSTRUCTIONS
1.  You were seen in the ENT Clinic today by Dr. Nissen.  If you have any questions or concerns after your appointment, please call [phone]. Press option #1 for scheduling related needs. Press option #3 for Nurse advice.  2.  Plan is to return to clinic in December with an audiogram prior to appointment.    Barbara Humphreys LPN  [facility]

## 2018-10-30 NOTE — PROGRESS NOTES
Dear Dr. Santoyo primary care provider on file.:    I had the pleasure of meeting Shauna Galdamez in consultation today at the [facility] at your request.    CHIEF COMPLAINT: Ears    HISTORY OF PRESENT ILLNESS: Patient is a 43-year-old in today for assessment of both ears.  She has had problems in the past with eustachian tube, has had multiple tubes as a kid.  She had one set of tubes placed last March.  She has been having troubles recently when she is in the mountains or flying.  The family likes to ski and they have been in the mountains more recently, both skiing and flying there.  Tubes replaced last March which helped a lot the last time there in the mountains.  She questions her hearing whether it is symmetrical or not.  Does not notice significant hearing issues.  She denies any tinnitus.  There is no dizziness.  Further denies any dysphasia, hoarseness, facial paresthesias.    ALLERGIES:    Allergies   Allergen Reactions     Sulfa Drugs Unknown     Bupropion Rash     Annotation: rash and joint pain         HABITS:   Alcohol use Yes 0.0 oz/week    History   Smoking Status     Never Smoker   Smokeless Tobacco     Never Used         PAST MEDICAL HISTORY: Please see today's intake form (for the remainder of the PMH) which I reviewed and signed.  Past Medical History:   Diagnosis Date     NO ACTIVE PROBLEMS        FAMILY HISTORY/SOCIAL HISTORY:   Family History   Problem Relation Age of Onset     Skin Cancer Mother      BCC     Mental Illness Mother      Thyroid Disease Mother      Stomach Problem Mother      Substance Abuse Father      Melanoma No family hx of     Social History     Social History     Marital status:      Spouse name: N/A     Number of children: N/A     Years of education: N/A     Occupational History     Not on file.     Social History Main Topics     Smoking status: Never Smoker     Smokeless tobacco: Never Used     Alcohol use 0.0 oz/week     Drug  use: No     Sexual activity: Yes     Partners: Male     Birth control/ protection: None     Other Topics Concern     Not on file     Social History Narrative       REVIEW OF SYSTEMS: Patient Supplied Answers to Review of Systems   ENT ROS 3/1/2018   Ears, Nose, Throat Sore throat       The remainder of the 10 point ROS is negative    PHYSICIAL EXAMINATION:  Constitutional: The patient was well-groomed and in no acute distress.   Skin: Warm and pink.  Psychiatric: The patient's affect was calm, cooperative, and appropriate.   Respiratory: Breathing comfortably without stridor or exertion of accessory muscles.  Eyes: Pupils were equal and reactive. Extraocular movement intact.   Head: Normocephalic and atraumatic. No lesions or scars.  Ears: Both ears examined on the microscope the finding of cerumen, for microscopic assessment and cleaning.  Right side initially was cleaned under high-power magnification with curettes.  Following removal of cerumen, TM shows tympanosclerosis present but no worrisome retraction or changes.  I see no fluid in the middle ear.  Left ear was cleaned and examined using microscope, curette, and similar techniques.  She has more compound sclerosis present on the left side, looks like she has a monomeric present inferiorly but TM is intact.  Nose: Sinuses were nontender. Anterior rhinoscopy revealed midline septum and absence of purulence or polyps.  Oral Cavity: Normal tongue, floor of mouth, buccal mucosa, and palate. No lesions or masses on inspection or palpation. No abnormal lymph tissue in the oropharynx.   Neck: The parotid is soft without masses. Supple with normal laryngeal and tracheal landmarks.   Lymphatic: There is no palpable lymphadenopathy or other masses in the neck.   Neurologic: Alert and oriented x 3. Cranial nerves III-XI within normal limits. Voice quality normal.  Cerebellar Function Tests:  Grossly normal    Audiogram: No audiogram today, tuning forks show positive  responses in both ears.  West does seem to lateralize to the left side indicating some conductive loss.      IMPRESSION AND PLAN:   1. Bilateral tympanosclerosis: Tympanosclerosis worse on the left side, looks long-term and stable.  She has a monomeric present on the left as well, again stable.  No further treatment needed at this time, monitor.  2. Bilateral eustachian tube dysfunction: History of multiple tubes as a child, one as an adult.  History of significant problems with flying and in the mountains.  Symptoms much better with tubes, tubes have now extruded.  Discussed placing tube before next flight in January and skiing in March.  Recommend audiogram before for baseline and to assess, also she would like to wait for tube placement so they will stand as long as possible in the new year.  3. Excessive cerumen: Ears cleaned today, TM is fully visualized after cleaning with findings as above.  No further treatment needed at this time.    Recommend follow-up in 6 weeks with audiogram.  We will get a baseline and assessment of her hearing, discussed tube placement.  We will likely place initially a type I tube, also discussed T-tube placement.  We will decide at that time following audiogram.    Thank you very much for the opportunity to participate in the care of your patient.    Rick L Nissen MD

## 2018-10-30 NOTE — LETTER
10/30/2018       RE: Shauna Galdamez  [address] [place] [address]     Dear Colleague,    Thank you for referring your patient, Shauna Galdamez, to the [facility] EAR NO[facility] AND THROAT at [facility]. Please see a copy of my visit note below.    Dear Dr. Santoyo primary care provider on file.:    I had the pleasure of meeting Shauna Galdamez in consultation today at the [facility] at your request.    CHIEF COMPLAINT: Ears    HISTORY OF PRESENT ILLNESS: Patient is a 43-year-old in today for assessment of both ears.  She has had problems in the past with eustachian tube, has had multiple tubes as a kid.  She had one set of tubes placed last March.  She has been having troubles recently when she is in the mountains or flying.  The family likes to ski and they have been in the mountains more recently, both skiing and flying there.  Tubes replaced last March which helped a lot the last time there in the mountains.  She questions her hearing whether it is symmetrical or not.  Does not notice significant hearing issues.  She denies any tinnitus.  There is no dizziness.  Further denies any dysphasia, hoarseness, facial paresthesias.    ALLERGIES:    Allergies   Allergen Reactions     Sulfa Drugs Unknown     Bupropion Rash     Annotation: rash and joint pain         HABITS:   Alcohol use Yes 0.0 oz/week    History   Smoking Status     Never Smoker   Smokeless Tobacco     Never Used         PAST MEDICAL HISTORY: Please see today's intake form (for the remainder of the PMH) which I reviewed and signed.  Past Medical History:   Diagnosis Date     NO ACTIVE PROBLEMS        FAMILY HISTORY/SOCIAL HISTORY:   Family History   Problem Relation Age of Onset     Skin Cancer Mother      BCC     Mental Illness Mother      Thyroid Disease Mother      Stomach Problem Mother      Substance Abuse Father      Melanoma No family hx of     Social  History     Social History     Marital status:      Spouse name: N/A     Number of children: N/A     Years of education: N/A     Occupational History     Not on file.     Social History Main Topics     Smoking status: Never Smoker     Smokeless tobacco: Never Used     Alcohol use 0.0 oz/week     Drug use: No     Sexual activity: Yes     Partners: Male     Birth control/ protection: None     Other Topics Concern     Not on file     Social History Narrative       REVIEW OF SYSTEMS: Patient Supplied Answers to Review of Systems   ENT ROS 3/1/2018   Ears, Nose, Throat Sore throat       The remainder of the 10 point ROS is negative    PHYSICIAL EXAMINATION:  Constitutional: The patient was well-groomed and in no acute distress.   Skin: Warm and pink.  Psychiatric: The patient's affect was calm, cooperative, and appropriate.   Respiratory: Breathing comfortably without stridor or exertion of accessory muscles.  Eyes: Pupils were equal and reactive. Extraocular movement intact.   Head: Normocephalic and atraumatic. No lesions or scars.  Ears: Both ears examined on the microscope the finding of cerumen, for microscopic assessment and cleaning.  Right side initially was cleaned under high-power magnification with curettes.  Following removal of cerumen, TM shows tympanosclerosis present but no worrisome retraction or changes.  I see no fluid in the middle ear.  Left ear was cleaned and examined using microscope, curette, and similar techniques.  She has more compound sclerosis present on the left side, looks like she has a monomeric present inferiorly but TM is intact.  Nose: Sinuses were nontender. Anterior rhinoscopy revealed midline septum and absence of purulence or polyps.  Oral Cavity: Normal tongue, floor of mouth, buccal mucosa, and palate. No lesions or masses on inspection or palpation. No abnormal lymph tissue in the oropharynx.   Neck: The parotid is soft without masses. Supple with normal laryngeal and  tracheal landmarks.   Lymphatic: There is no palpable lymphadenopathy or other masses in the neck.   Neurologic: Alert and oriented x 3. Cranial nerves III-XI within normal limits. Voice quality normal.  Cerebellar Function Tests:  Grossly normal    Audiogram: No audiogram today, tuning forks show positive responses in both ears.  West does seem to lateralize to the left side indicating some conductive loss.      IMPRESSION AND PLAN:   1. Bilateral tympanosclerosis: Tympanosclerosis worse on the left side, looks long-term and stable.  She has a monomeric present on the left as well, again stable.  No further treatment needed at this time, monitor.  2. Bilateral eustachian tube dysfunction: History of multiple tubes as a child, one as an adult.  History of significant problems with flying and in the mountains.  Symptoms much better with tubes, tubes have now extruded.  Discussed placing tube before next flight in January and skiing in March.  Recommend audiogram before for baseline and to assess, also she would like to wait for tube placement so they will stand as long as possible in the new year.  3. Excessive cerumen: Ears cleaned today, TM is fully visualized after cleaning with findings as above.  No further treatment needed at this time.    Recommend follow-up in 6 weeks with audiogram.  We will get a baseline and assessment of her hearing, discussed tube placement.  We will likely place initially a type I tube, also discussed T-tube placement.  We will decide at that time following audiogram.    Thank you very much for the opportunity to participate in the care of your patient.    Rick L Nissen MD

## 2018-10-30 NOTE — NURSING NOTE
Chief Complaint   Patient presents with     Consult     PE tube        Richard Montes De Oca LPN

## 2018-10-30 NOTE — MR AVS SNAPSHOT
After Visit Summary   10/30/2018    Shauna Galdamez    MRN: [id number]           Patient Information     Date Of Birth          1975        Visit Information        Provider Department      10/30/2018 10:30 AM Nissen, Rick L, MD [facility]        Care Instructions    1.  You were seen in the ENT Clinic today by Dr. Nissen.  If you have any questions or concerns after your appointment, please call [phone]. Press option #1 for scheduling related needs. Press option #3 for Nurse advice.  2.  Plan is to return to clinic in December with an audiogram prior to appointment.    Barbara Humphreys LPN  [facility]            Follow-ups after your visit        Your next 10 appointments already scheduled     Nov 09, 2018 10:00 AM CST   Return Visit with Amaya Alves, PhD    [facility]  ([facility])    [place] [facility] [address]  [address]  [facility] [address]   [phone]            Nov 13, 2018  1:00 PM CST   Return Visit with Amaya Alves, PhD    [facility]  ([facility])    [place] [facility]  [address]rd [address], [address]  [facility] [address]   [phone]            Nov 27, 2018  1:00 PM CST   Return Visit with Amaya Alves, PhD    [facility]  ([facility])    [place] [facility]  [address], [address]  [address]  [facility] [address]   [phone]            Dec 04, 2018 10:00 AM CST   Return Visit with Amaya Alves, PhD    [facility]  ([facility])    [place] [facility]  [address], [address]  [address]  [facility] [address]   [phone]            Dec 11, 2018 10:00 AM CST   Return Visit with Amaya Alves, PhD    [facility]  ([facility])    [place] [facility] [address]  [facility] [address]   [phone]            Dec 18, 2018  9:30 AM CST   Walk In From ENT with Marcel [facility] ([facility])    [address] [facility]  [address] [facility] [id number] [phone]            Dec 18, 2018 10:00 AM CST   (Arrive by 9:45 AM)   Return Visit with Rick L Nissen, MD [facility] ([facility])    [address] [facility]  [address] [facility] [id number] [phone]            Dec 18, 2018 11:00 AM CST   Return Visit with Amaya Alves, PhD    [facility]  ([facility])    [place] [facility]  [address]rd [address], [address]  [address]  [facility] [address]   [phone]            Jan 04, 2019 10:00 AM CST   Return Visit with Amaya Alves, PhD    [facility]  ([facility])    [place] [facility]  [address], [address]  [address]  [facility] [address]   [phone]            Jan 08, 2019 10:00 AM CST   Return Visit with Amaya Alves, PhD    [facility]  ([facility])    [place] [facility]  [address]rd [address], [address]  [address]  [facility] [address]   [phone]              Who to contact     Please call your clinic at [phone] to:    Ask questions about your health    Make or cancel appointments    Discuss your medicines    Learn about your test results    Speak to your doctor            Additional Information About Your Visit        [email or web address] Information     [email or web address] gives you secure access to your electronic health record. If you see a primary care provider, you can also send messages to your care team and make appointments. If you have questions, please call your primary care clinic.  If you do not have a primary care provider, please call [phone] and they will assist you.      [employer] is an electronic  gateway that provides easy, online access to your medical records. With [employer], you can request a clinic appointment, read your test results, renew a prescription or communicate with your care team.     To access your existing account, please contact your [facility] or call [phone] for assistance.        Care EveryWhere ID     This is your Care EveryWhere ID. This could be used by other organizations to access your [place] medical records  [id number]         Blood Pressure from Last 3 Encounters:   10/05/18 128/82   06/29/18 110/77   11/30/15 115/78    Weight from Last 3 Encounters:   10/05/18 69.7 kg (153 lb 9.6 oz)   09/07/18 68.5 kg (151 lb)   06/29/18 65.3 kg (143 lb 14.4 oz)              Today, you had the following     No orders found for display       Primary Care Provider    None Specified       No primary provider on file.        Equal Access to Services     [facility]: Hadii anat Gerard, wayovany wagner, hue kaalmada jamila, deven mi . So [facility] [phone].    ATENCIÓN: Si habla español, tiene a dean disposición servicios gratuitos de asistencia lingüística. Llame al [phone].    We comply with applicable federal civil rights laws and [place] laws. We do not discriminate on the basis of race, color, national origin, age, disability, sex, sexual orientation, or gender identity.            Thank you!     Thank you for choosing [facility] EAR NOSE AND THROAT  for your care. Our goal is always to provide you with excellent care. Hearing back from our patients is one way we can continue to improve our services. Please take a few minutes to complete the written survey that you may receive in the mail after your visit with us. Thank you!             Your Updated Medication List - Protect others around you: Learn how to safely use, store and throw away your medicines at www.disposemymeds.org.          This list is  accurate as of 10/30/18 11:02 AM.  Always use your most recent med list.                   Brand Name Dispense Instructions for use Diagnosis    PROZAC PO

## 2018-11-09 ENCOUNTER — OFFICE VISIT (OUTPATIENT)
Dept: PSYCHOLOGY | Facility: CLINIC | Age: 43
End: 2018-11-09
Payer: COMMERCIAL

## 2018-11-09 DIAGNOSIS — F41.1 GAD (GENERALIZED ANXIETY DISORDER): Primary | ICD-10-CM

## 2018-11-09 DIAGNOSIS — F33.41 RECURRENT MAJOR DEPRESSIVE DISORDER, IN PARTIAL REMISSION (H): ICD-10-CM
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
## 2018-11-09 NOTE — MR AVS SNAPSHOT
After Visit Summary   11/9/2018    Shauna Galdamez    MRN: [id number]           Patient Information     Date Of Birth          1975        Visit Information        Provider Department      11/9/2018 10:00 AM Amaya Alves, PhD  [facility]         Today's Diagnoses     SUKHI (generalized anxiety disorder)    -  1    Recurrent major depressive disorder, in partial remission (H)           Follow-ups after your visit        Your next 10 appointments already scheduled     Nov 13, 2018  1:00 PM CST   Return Visit with Amaya Alves, PhD    [facility]  ([facility])    [facility]  [address]rd Flr, [address]  [facility] [address]   [phone]            Nov 27, 2018  1:00 PM CST   Return Visit with Amaya Alves, PhD    [facility]  ([facility])    [facility]  [address]rd Fl[address], [address]  [address]  [facility] [address]   [phone]            Dec 04, 2018 10:00 AM CST   Return Visit with Amaya Alves, PhD    [facility]  ([facility])    [facility]  [address]rd Flr, [address]  [address]  [facility] [address]   [phone]            Dec 11, 2018 10:00 AM CST   Return Visit with Amaya Alves, PhD    [facility]  ([facility])    [facility]  [address]rd Flr, [address]  [address]  [facility] [address]   [phone]            Dec 18, 2018  9:30 AM CST   Walk In From ENT with Marcel Moya [facility] Audiology ([facility])    [address] [place] [address]  4th [facility] [address]   [phone]            Dec 18, 2018 10:00 AM CST   (Arrive by 9:45 AM)   Return Visit with Rick L Nissen, MD [facility] Ear Nose and Throat ([facility])    [address] [place] [address]  [facility] [address]   [phone]            Dec 18, 2018 11:00 AM CST   Return Visit with Amaya Alves, PhD BHARATH   [facility]  ([facility])    [facility]  [address]rd Flr, [address]  [address]  [facility] [address]   [phone]            Jan 04, 2019 10:00 AM CST   Return Visit with Amaya Alves, PhD BHARATH   [facility]  ([facility])    [facility]  [address]rd Flr, [address]  [address]  [facility] [address]   [phone]            Jan 08, 2019 10:00 AM CST   Return Visit with Amaya Alves, PhD    [facility]  ([facility])    [facility]  3rd Flr, [address]  [address]  [facility] [address]   [phone]            Benton 15, 2019 10:00 AM CST   Return Visit with Amaya Alves, PhD    [facility]  ([facility])    [facility]  [address]rd Fl[address], [address]  [address]  [facility] [address]   [phone]              Who to contact     Please call your clinic at [phone] to:    Ask questions about your health    Make or cancel appointments    Discuss your medicines    Learn about your test results    Speak to your doctor            Additional Information About Your Visit        [employer] Information     [employer] gives you secure access to your electronic health record. If you see a primary care provider, you can also send messages to your care team and make appointments. If you have questions, please call your primary care clinic.  If you do not have a primary care provider, please call [phone] and they will assist you.      [employer] is an electronic gateway that provides easy, online access to your medical records. With [employer], you can request a clinic appointment, read your test results, renew a prescription or communicate with your care team.     To access your existing account,  please contact your [facility] or call [phone] for assistance.        Care EveryWhere ID     This is your Care EveryWhere ID. This could be used by other organizations to access your [place] medical records  [id number]         Blood Pressure from Last 3 Encounters:   10/05/18 128/82   06/29/18 110/77   11/30/15 115/78    Weight from Last 3 Encounters:   10/05/18 69.7 kg (153 lb 9.6 oz)   09/07/18 68.5 kg (151 lb)   06/29/18 65.3 kg (143 lb 14.4 oz)              We Performed the Following     Individual Psychotherapy - Psychotherapy with pt and/or family present  60 mins [53+ mins] (43394)        Primary Care Provider    None Specified       No primary provider on file.        Equal Access to Services     RON VAUGHAN : Jenae Gerard, wayovany wagner, hue kaalmaalanna marino, deven mi . So [facility] [phone].    ATENCIÓN: Si habla español, tiene a dean disposición servicios gratuitos de asistencia lingüística. Llame al [phone].    We comply with applicable federal civil rights laws and [place] laws. We do not discriminate on the basis of race, color, national origin, age, disability, sex, sexual orientation, or gender identity.            Thank you!     Thank you for choosing [facility]   for your care. Our goal is always to provide you with excellent care. Hearing back from our patients is one way we can continue to improve our services. Please take a few minutes to complete the written survey that you may receive in the mail after your visit with us. Thank you!             Your Updated Medication List - Protect others around you: Learn how to safely use, store and throw away your medicines at www.disposemymeds.org.          This list is accurate as of 11/9/18  5:54 PM.  Always use your most recent med list.                   Brand Name Dispense Instructions for use Diagnosis    PROZAC PO

## 2018-11-13 ENCOUNTER — OFFICE VISIT (OUTPATIENT)
Dept: PSYCHOLOGY | Facility: CLINIC | Age: 43
End: 2018-11-13
Payer: COMMERCIAL

## 2018-11-13 DIAGNOSIS — F33.9 EPISODE OF RECURRENT MAJOR DEPRESSIVE DISORDER, UNSPECIFIED DEPRESSION EPISODE SEVERITY (H): ICD-10-CM

## 2018-11-13 DIAGNOSIS — F41.1 GAD (GENERALIZED ANXIETY DISORDER): Primary | ICD-10-CM

## 2018-11-13 NOTE — MR AVS SNAPSHOT
After Visit Summary   11/13/2018    Shauna Galdamez    MRN: [id number]           Patient Information     Date Of Birth          1975        Visit Information        Provider Department      11/13/2018 1:00 PM Amaya Alves, PhD  [facility]         Today's Diagnoses     SUKHI (generalized anxiety disorder)    -  1    Episode of recurrent major depressive disorder, unspecified depression episode severity (H)           Follow-ups after your visit        Your next 10 appointments already scheduled     Nov 27, 2018  1:00 PM CST   Return Visit with Amaya Alves, PhD BHARATH   [facility]  ([facility])    [facility]  [address]rd Flr, [address]  [address]  [facility] [address]   [phone]            Dec 04, 2018 10:00 AM CST   Return Visit with Amaya Alves, PhD    [facility]  ([facility])    [facility]  [address]rd Flr, [address]  [address]  [facility] [address]   [phone]            Dec 11, 2018 10:00 AM CST   Return Visit with Amaya Alves, PhD    [facility]  ([facility])    [facility]  [address]rd Flr, [address]  [address]  [facility] [address]   [phone]            Dec 18, 2018  9:30 AM CST   Walk In From ENT with Marcel Moya   [facility] Audiology ([facility])    [address] [place] [id number] [phone]            Dec 18, 2018 10:00 AM CST   (Arrive by 9:45 AM)   Return Visit with Rick L Nissen, MD   [facility] Ear Nose and Throat ([facility])    [address] [place] [id number] [phone]            Dec 18, 2018 11:00 AM CST   Return Visit with Amaya Alves, PhD    [facility]  ([facility])    [facility]  [address]rd Flr,  [address]  [address]  [facility] [address]   [phone]            Jan 04, 2019 10:00 AM CST   Return Visit with Amaya Alves, PhD BHARATH   [facility]  ([facility])    [facility]  [address]rd Flr, [address]  [address]  [facility] [address]   [phone]            Jan 08, 2019 10:00 AM CST   Return Visit with Amaya Alves, PhD BHARATH   [facility]  ([facility])    [facility]  [address]rd Flr, [address]  [address]  [facility] [address]   [phone]            [place] 15, 2019 10:00 AM CST   Return Visit with Amaya Alves, PhD    [facility]  ([facility])    [facility]  [address]rd Flr, [address]  [address]  [facility] [address]   [phone]            Jan 22, 2019 10:00 AM CST   Return Visit with Amaya Alves, PhD    [facility]  ([facility])    [facility]  3rd Flr, [address]  [address]  [facility] [address]   [phone]              Who to contact     Please call your clinic at [phone] to:    Ask questions about your health    Make or cancel appointments    Discuss your medicines    Learn about your test results    Speak to your doctor            Additional Information About Your Visit        [employer] Information     [employer] gives you secure access to your electronic health record. If you see a primary care provider, you can also send messages to your care team and make appointments. If you have questions, please call your primary care clinic.  If you do not have a primary care provider, please call [phone] and they will assist you.      [employer] is an electronic gateway that provides easy, online access to your medical records. With [employer], you can request a clinic appointment, read your test results, renew a prescription or communicate with your care team.     To access your  existing account, please contact your [facility] or call [phone] for assistance.        Care EveryWhere ID     This is your Care EveryWhere ID. This could be used by other organizations to access your [place] medical records  [id number]         Blood Pressure from Last 3 Encounters:   10/05/18 128/82   06/29/18 110/77   11/30/15 115/78    Weight from Last 3 Encounters:   10/05/18 69.7 kg (153 lb 9.6 oz)   09/07/18 68.5 kg (151 lb)   06/29/18 65.3 kg (143 lb 14.4 oz)              We Performed the Following     Individual Psychotherapy - Psychotherapy with pt and/or family present  60 mins [53+ mins] (11628)        Primary Care Provider    None Specified       No primary provider on file.        Equal Access to Services     RON VAUGHAN : Jenae Gerard, wayovany wagner, hue kaalmaalanna marino, deven mi . So [facility] [phone].    ATENCIÓN: Si habla español, tiene a dean disposición servicios gratuitos de asistencia lingüística. Llame al [phone].    We comply with applicable federal civil rights laws and [place] laws. We do not discriminate on the basis of race, color, national origin, age, disability, sex, sexual orientation, or gender identity.            Thank you!     Thank you for choosing [facility]   for your care. Our goal is always to provide you with excellent care. Hearing back from our patients is one way we can continue to improve our services. Please take a few minutes to complete the written survey that you may receive in the mail after your visit with us. Thank you!             Your Updated Medication List - Protect others around you: Learn how to safely use, store and throw away your medicines at www.disposemymeds.org.          This list is accurate as of 11/13/18  4:49 PM.  Always use your most recent med list.                   Brand Name Dispense Instructions for use Diagnosis     PROZAC PO
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
## 2018-11-27 ENCOUNTER — OFFICE VISIT (OUTPATIENT)
Dept: PSYCHOLOGY | Facility: CLINIC | Age: 43
End: 2018-11-27
Payer: COMMERCIAL

## 2018-11-27 DIAGNOSIS — F41.1 GAD (GENERALIZED ANXIETY DISORDER): Primary | ICD-10-CM

## 2018-11-27 DIAGNOSIS — F33.9 EPISODE OF RECURRENT MAJOR DEPRESSIVE DISORDER, UNSPECIFIED DEPRESSION EPISODE SEVERITY (H): ICD-10-CM

## 2018-11-27 NOTE — PROGRESS NOTES
"  Name:  Shauna Galdamez  Mrn: [id number]  Date of visit: 2018    PSYCHOLOGY OUTPATIENT VISIT NOTE       Treatment plan: 18  PRESENTING PROBLEMS/SYMPTOMS:    Anxiety increasing to very high in March,  I can t let go of worry;  with associated insomnia, poor concentration,  racing mind,   shaking on the inside.   Depressed mood., uncertain when episode began but significant by 2018.  Anhedonia significantly impacting work function,  I didn t want to get up and go to work.    Insomnia and noted needing at least 8hrs sleep/night.  Energy improving, very low in Spring.  Self esteem lower in Spring and improving. [kids = Gavin (13yo), Shaquille (boy) 10yo, Zaria (3yo); Bro () = Aldo;  = Derrick]  INTERVENTION AND RESPONSE:  Cognitive Behavioral therapy, individual.  Reported improved relationship with mom as mom less critical, unsure reason.  Bringing kids to mental health visits and her own visit, work and exhausting.  With B: it's all about you.  Parallels to family of origin with bro and dad addicts and mom's mental health.  Different outcome, emotions, \"here I am again\" with son's mental health.  Sense of love with family but not from B, \"emotional connection missing,\" stomach sick, fear and anxiety and can't control.  Possibility of \"some kind of wall\" but loss, sad.  Memories of J, his death, memory of doors closing for him, anger.  Pain in head.  Feeling higher anxiety and emotions \"raw.\"  Practiced grounding.  Pt reported feeling more ready to leave session.  Able to use tools: mindfulness and distraction.    [Previous visit:  H valuing \"stoicism\" and pt being stoic.  Her sense of anger \"pretty new, not a key part of childhood.\"  Importance of anger and space to express it.  previous visit: tendency to \"bite my tongue\" and this coming up in current visit.) ]  ASSESSMENT: Pt tearful and feeling loss in relationship she wanted with B.  Expressed strong commitment to loving B and " "recognizing his efforts to be part of family.  Continuing context:  Hx with mom's high emotions, hiding her own emotions so \"mom won't be sad,\" anger towards mom.  Belief: Love is having no boundaries. Memory of Aldo, never had boundaries and love, best friend, no regrets.  Shaquille's adoption right after J's suicide, H telling her she's \"grieved enough\" and her anger.Brother alcoholic and mental health problems,   in .  Dad also alcoholic and \"sober\" since .  Mom  going crazy  with anxiety,  personality changes and episodes of rage.  Mother in law has terminal cancer.  3 kids adopted from [place], behavioral and emotional problems and receiving therapies. Following [employer] program (shakes as meal replacement) and noted history of problems with overweight. Severe TMJ history:  TMJ hx, severe, 14yo-22yo, chronic pain.  Fear of pain returning.  Taking fluoxetine, 40mg/day.    Mental Status Assessment:  Appearance:   Appropriate   Eye Contact:   Good   Psychomotor Behavior: Normal body tense  Attitude:   Cooperative   Orientation:   All  Speech   Rate / Production: Normal    Volume:  Normal   Mood:    Range: sad, tearful and worried, tense  Thought Content:  Clear   Thought Form:  Coherent  Logical   Insight:    Fair     DIAGNOSIS:  SUKHI. MDD recurrent unspecified    PLAN:    Patient to schedule followup visit.       Total time spent equals 60 minutes, individual psychotherapy.             "

## 2018-11-27 NOTE — MR AVS SNAPSHOT
After Visit Summary   11/27/2018    Shauna Galdamez    MRN: [id number]           Patient Information     Date Of Birth          1975        Visit Information        Provider Department      11/27/2018 1:00 PM Amaya Alves, PhD  [facility]         Today's Diagnoses     SUKHI (generalized anxiety disorder)    -  1    Episode of recurrent major depressive disorder, unspecified depression episode severity (H)           Follow-ups after your visit        Your next 10 appointments already scheduled     Dec 04, 2018 10:00 AM CST   Return Visit with Amaya Alves, PhD BHARATH   [facility]  ([facility])    [facility]  [address]rd [address], [address]  [address]  [facility] [address]   [phone]            Dec 11, 2018 10:00 AM CST   Return Visit with Amaya Alves, PhD    [facility]  ([facility])    [facility]  [address], [address]  [address] [facility] [address]   [phone]            Dec 18, 2018  9:30 AM CST   Walk In From ENT with Marcel Moya   [facility] ([facility])    [address] [place] [id number] [phone]            Dec 18, 2018 10:00 AM CST   (Arrive by 9:45 AM)   Return Visit with Rick L Nissen, MD   [facility] Nose and Throat ([facility])    [address] [place] [id number] [phone]            Dec 18, 2018 11:00 AM CST   Return Visit with Amaya Alves, PhD    [facility]  ([facility])    [facility]  [address], [address]  [address]  [facility] [address]   [phone]            Jan 04, 2019 10:00 AM CST   Return Visit with Amaya Alves, PhD    [facility]  ([facility])    [facility]  [address]rd Flr,  [address]  [address]  [facility] [address]   [phone]            Jan 08, 2019 10:00 AM CST   Return Visit with Amaya Alves, PhD BHARATH   [facility]  ([facility])    [place] [facility]  [address]rd Fl[address], [address]  [address]  [facility] [place] [address]   [phone]            Benton 15, 2019 10:00 AM CST   Return Visit with Amaya Alves, PhD BHARATH   [facility]  ([facility])    [place] [facility]  [address]rd [address], [address]  [address]  [facility] [address]   [phone]            Jan 22, 2019 10:00 AM CST   Return Visit with Amaya Alves, PhD    [facility]  ([facility])    [facility] [facility]  [address]rd Fl[address], [address]  [address]  [facility] [address]   [phone]            Jan 29, 2019 10:00 AM CST   Return Visit with Amaya Alves, PhD    [facility]  ([facility])    [facility]  [address]rd Flr, [address]  [address]  [facility] [place] [address]   [phone]              Who to contact     Please call your clinic at [phone] to:    Ask questions about your health    Make or cancel appointments    Discuss your medicines    Learn about your test results    Speak to your doctor            Additional Information About Your Visit        BABADU Information     BABADU gives you secure access to your electronic health record. If you see a primary care provider, you can also send messages to your care team and make appointments. If you have questions, please call your primary care clinic.  If you do not have a primary care provider, please call [phone] and they will assist you.      BABADU is an electronic gateway that provides easy, online access to your medical records. With BABADU, you can request a clinic appointment, read your test results, renew a prescription or communicate with your care team.     To access your  existing account, please contact your [facility] or call [phone] for assistance.        Care EveryWhere ID     This is your Care EveryWhere ID. This could be used by other organizations to access your [place] medical records  [id number]         Blood Pressure from Last 3 Encounters:   10/05/18 128/82   06/29/18 110/77   11/30/15 115/78    Weight from Last 3 Encounters:   10/05/18 69.7 kg (153 lb 9.6 oz)   09/07/18 68.5 kg (151 lb)   06/29/18 65.3 kg (143 lb 14.4 oz)              We Performed the Following     Individual Psychotherapy - Psychotherapy with pt and/or family present  60 mins [53+ mins] (14281)        Primary Care Provider    None Specified       No primary provider on file.        Equal Access to Services     RON VAUGHAN : Jenae Gerard, wayovany wagner, hue kaalmaalanna marino, deven mi . So [facility] [phone].    ATENCIÓN: Si habla español, tiene a dean disposición servicios gratuitos de asistencia lingüística. Llame al [phone].    We comply with applicable federal civil rights laws and [place] laws. We do not discriminate on the basis of race, color, national origin, age, disability, sex, sexual orientation, or gender identity.            Thank you!     Thank you for choosing [facility]   for your care. Our goal is always to provide you with excellent care. Hearing back from our patients is one way we can continue to improve our services. Please take a few minutes to complete the written survey that you may receive in the mail after your visit with us. Thank you!             Your Updated Medication List - Protect others around you: Learn how to safely use, store and throw away your medicines at www.disposemymeds.org.          This list is accurate as of 11/27/18  6:01 PM.  Always use your most recent med list.                   Brand Name Dispense Instructions for use Diagnosis     PROZAC PO

## 2018-12-14 DIAGNOSIS — H91.90 HEARING LOSS, UNSPECIFIED HEARING LOSS TYPE, UNSPECIFIED LATERALITY: Primary | ICD-10-CM

## 2018-12-18 ENCOUNTER — OFFICE VISIT (OUTPATIENT)
Dept: PSYCHOLOGY | Facility: CLINIC | Age: 43
End: 2018-12-18
Payer: COMMERCIAL

## 2018-12-18 ENCOUNTER — OFFICE VISIT (OUTPATIENT)
Dept: AUDIOLOGY | Facility: CLINIC | Age: 43
End: 2018-12-18
Payer: COMMERCIAL

## 2018-12-18 ENCOUNTER — OFFICE VISIT (OUTPATIENT)
Dept: OTOLARYNGOLOGY | Facility: CLINIC | Age: 43
End: 2018-12-18
Payer: COMMERCIAL

## 2018-12-18 VITALS — HEART RATE: 75 BPM | OXYGEN SATURATION: 97 % | SYSTOLIC BLOOD PRESSURE: 109 MMHG | DIASTOLIC BLOOD PRESSURE: 74 MMHG

## 2018-12-18 DIAGNOSIS — H92.03 EAR PAIN, BILATERAL: ICD-10-CM

## 2018-12-18 DIAGNOSIS — F41.1 GAD (GENERALIZED ANXIETY DISORDER): Primary | ICD-10-CM

## 2018-12-18 DIAGNOSIS — H91.90 HEARING LOSS, UNSPECIFIED HEARING LOSS TYPE, UNSPECIFIED LATERALITY: ICD-10-CM

## 2018-12-18 DIAGNOSIS — H90.72 MIXED CONDUCTIVE AND SENSORINEURAL HEARING LOSS OF LEFT EAR WITH UNRESTRICTED HEARING OF RIGHT EAR: Primary | ICD-10-CM

## 2018-12-18 DIAGNOSIS — H93.8X3 EAR PRESSURE, BILATERAL: ICD-10-CM

## 2018-12-18 DIAGNOSIS — F33.9 EPISODE OF RECURRENT MAJOR DEPRESSIVE DISORDER, UNSPECIFIED DEPRESSION EPISODE SEVERITY (H): ICD-10-CM

## 2018-12-18 DIAGNOSIS — H69.93 ETD (EUSTACHIAN TUBE DYSFUNCTION), BILATERAL: Primary | ICD-10-CM

## 2018-12-18 ASSESSMENT — PAIN SCALES - GENERAL: PAINLEVEL: NO PAIN (0)

## 2018-12-18 NOTE — NURSING NOTE
Chief Complaint   Patient presents with     RECHECK     1 month follow up declined weight.     Blood pressure 109/74, pulse 75, SpO2 97 %, not currently breastfeeding.    Richard Montes De Oca LPN
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
## 2018-12-18 NOTE — PATIENT INSTRUCTIONS
1.  You were seen in the ENT Clinic today by Dr. Nissen.  If you have any questions or concerns after your appointment, please call [phone]. Press option #1 for scheduling related needs. Press option #3 for Nurse advice.  2.  Plan is to return to clinic in 1 year, or sooner with any concerns or problems.    Barbara Humphreys LPN  [facility]  [phone]

## 2018-12-18 NOTE — LETTER
12/18/2018        RE: Shauna Galdamez  [address] [place] [address]     Dear Colleague,    Thank you for referring your patient, Shauna Galdamez, to the [facility]ROAT at [facility]. Please see a copy of my visit note below.    Dear Dr. Santoyo primary care provider on file.:    I had the pleasure of seeing Shauna Galdamez in followup today at the [facility].    CHIEF COMPLAINT: Ears    HISTORY OF PRESENT ILLNESS: Patient is a 43-year-old in today for follow-up from her last visit 10/30/18.  She has had problems with eustachian tube in the past and has had multiple tubes in the past.  She has problems when [place] is in the mountains, [place], and has had tubes several times which have helped the symptoms.  Her last tubes were placed in March.  At her last visit they were out, she wanted to wait until now to have them replaced as she will not be flying until 1 January.  We discussed placing T tubes which she comes in today to have completed.  She feels her hearing is fairly stable, has had a mild hearing loss in the left ear for some time.  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  She does have occasional tinnitus.  There is no dizziness or balance concerns.  She denies any dysphagia, hoarseness, facial paresthesias.    MEDICATIONS: Please refer to the detailed medication reconciliation performed by my nurse today, which I have reviewed and signed.     ALLERGIES:    Allergies   Allergen Reactions     Sulfa Drugs Unknown     Bupropion Rash     Annotation: rash and joint pain         HABITS: Social History    Substance and Sexual Activity      Alcohol use: Yes        Alcohol/week: 0.0 oz     History   Smoking Status     Never Smoker   Smokeless Tobacco     Never Used         PAST MEDICAL HISTORY:  Please see today's intake form (for the remainder of the PMH) which I reviewed and  signed.  Past Medical History:   Diagnosis Date     NO ACTIVE PROBLEMS        FAMILY HISTORY/SOCIAL HISTORY:    Family History   Problem Relation Age of Onset     Skin Cancer Mother         BCC     Mental Illness Mother      Thyroid Disease Mother      Stomach Problem Mother      Substance Abuse Father      Melanoma No family hx of       Social History     Socioeconomic History     Marital status:      Spouse name: Not on file     Number of children: Not on file     Years of education: Not on file     Highest education level: Not on file   Social Needs     Financial resource strain: Not on file     Food insecurity - worry: Not on file     Food insecurity - inability: Not on file     Transportation needs - medical: Not on file     Transportation needs - non-medical: Not on file   Occupational History     Not on file   Tobacco Use     Smoking status: Never Smoker     Smokeless tobacco: Never Used   Substance and Sexual Activity     Alcohol use: Yes     Alcohol/week: 0.0 oz     Drug use: No     Sexual activity: Yes     Partners: Male     Birth control/protection: None   Other Topics Concern     Parent/sibling w/ CABG, MI or angioplasty before 65F 55M? Not Asked   Social History Narrative     Not on file       REVIEW OF SYSTEMS: Patient Supplied Answers to Review of Systems   ENT ROS 3/1/2018   Ears, Nose, Throat Sore throat       The remainder of the 10 point ROS is negative    PHYSICIAL EXAMINATION:  Constitutional: The patient was well-groomed and in no acute distress.   Skin: Warm and pink.  Psychiatric: The patient's affect was calm, cooperative, and appropriate.   Respiratory: Breathing comfortably without stridor or exertion of accessory muscles.  Eyes: Pupils were equal and reactive. Extraocular movement intact.   Head: Normocephalic and atraumatic. No lesions or scars.  Ears: Both ears examined under the microscope and shows both TMs to be intact and in good position.  Middle ears look to be  air-filled.  There is slight retraction to the TM and I can see no movement with Valsalva maneuvers.  Nose: Sinuses were nontender. Anterior rhinoscopy revealed midline septum and absence of purulence or polyps.  Oral Cavity: Normal tongue, floor of mouth, buccal mucosa, and palate. No abnormal lymph tissue in the oropharynx.   Neck: The parotid is soft without masses. Supple with normal laryngeal and tracheal landmarks.   Lymphatic: There is no palpable lymphadenopathy or other masses in the neck.   Neurologic: Alert and oriented x 3. Cranial nerves III-XI within normal limits. Voice quality normal.  Cerebellar Function Tests:  Grossly normal    Audiogram: Audiogram reviewed shows normal hearing in the right ear, a mild mixed hearing loss on the left side.  100% discrimination bilaterally.  Subjectively she feels hearing is symmetrical.  She does have good tympanogram levels today.    IMPRESSION AND PLAN:   1. History of eustachian tube dysfunction/multiple tube placements: Discussed options for placement of T tubes today.  Risk of persistent perforation, drainage, continued symptoms, unforeseen complications etc. all discussed in great detail.  She desires to proceed and T tubes completed without incident.  2. Bilateral ear pressure: Secondary to eustachian tube dysfunction.  Tubes placed, no further treatment needed.    PROCEDURE NOTE:  Pt placed supine under the microscope. The right ear was examined. Under high power magnification, phenol was placed on the inferior quadrant. Myringotomy then made with myringotomy knife.  A T-tube was then placed followed by antibiotic drops.   The left ear was inspected.  Myringotomy again made with the myringotomy blade.  A T-tube was placed through the myringotomy site followed by drops. The procedure was terminated. Pt released to their own care.    Patient will follow-up in 1 year, sooner if any concerns or problems.      Thank you very much for the opportunity to  participate in the care of your patient.    Rick L Nissen MD

## 2018-12-18 NOTE — PROGRESS NOTES
AUDIOLOGY REPORT    SUMMARY: Audiology visit completed. See audiogram for results.      RECOMMENDATIONS: Follow-up with ENT.      Marcel Moya  Audiologist  [place] License  #[id number]

## 2018-12-18 NOTE — NURSING NOTE
Invasive Procedure Safety Checklist  Procedure:  Bilateral Myringotomy and T-Tube Placement    Responsible person(s):  Complete sections as appropriate and electronically sign and date below.    Staff/Provider  Consent documentation on chart:  YES  H&P is not applicable (when straight local anesthesia is used).    Procedure Team  Completed by comparing informed consent documentation, information on the patient record and/or the marked surgical site, and discussion with the patient/guardian.     Verified:  (Select all that apply)  Patient identification (two indicators)  Procedure to be performed  Procedure site and /or laterality and/or level  Consent  Procedure site:  Site can not be marked due to location.  Provider [place] - Site/Laterality/Level:  Bilateral  Staff/Provider:  No images    Procedure Team:  *Pause for the Cause* verbal and active participation of team members- verify:  Patient name:  YES  Procedure to be performed:  YES  Site, laterality and level, noting patient position:  YES    Above steps completed as applicable (Electronic Signature, Title, Date):    Barbara Humphreys LPN      Note:  Any incidents of wrong patient, wrong procedure, or wrong site are reported using the Occurrence Process already in place.  The occurrence form is required to be completed immediately with this type of event.

## 2018-12-19 NOTE — PROGRESS NOTES
Dear Dr. Santoyo primary care provider on file.:    I had the pleasure of seeing Shauna Galdamez in followup today at the [facility].    CHIEF COMPLAINT: Ears    HISTORY OF PRESENT ILLNESS: Patient is a 43-year-old in today for follow-up from her last visit 10/30/18.  She has had problems with eustachian tube in the past and has had multiple tubes in the past.  She has problems when she is in the mountains, flies, and has had tubes several times which have helped the symptoms.  Her last tubes were placed in March.  At her last visit they were out, she wanted to wait until now to have them replaced as she will not be flying until 1 January.  We discussed placing T tubes which she comes in today to have completed.  She feels her hearing is fairly stable, has had a mild hearing loss in the left ear for some time.  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  She does have occasional tinnitus.  There is no dizziness or balance concerns.  She denies any dysphagia, hoarseness, facial paresthesias.    MEDICATIONS: Please refer to the detailed medication reconciliation performed by my nurse today, which I have reviewed and signed.     ALLERGIES:    Allergies   Allergen Reactions     Sulfa Drugs Unknown     Bupropion Rash     Annotation: rash and joint pain         HABITS: Social History    Substance and Sexual Activity      Alcohol use: Yes        Alcohol/week: 0.0 oz     History   Smoking Status     Never Smoker   Smokeless Tobacco     Never Used         PAST MEDICAL HISTORY:  Please see today's intake form (for the remainder of the PMH) which I reviewed and signed.  Past Medical History:   Diagnosis Date     NO ACTIVE PROBLEMS        FAMILY HISTORY/SOCIAL HISTORY:    Family History   Problem Relation Age of Onset     Skin Cancer Mother         BCC     Mental Illness Mother      Thyroid Disease Mother      Stomach Problem Mother      Substance Abuse Father      Melanoma  No family hx of       Social History     Socioeconomic History     Marital status:      Spouse name: Not on file     Number of children: Not on file     Years of education: Not on file     Highest education level: Not on file   Social Needs     Financial resource strain: Not on file     Food insecurity - worry: Not on file     Food insecurity - inability: Not on file     Transportation needs - medical: Not on file     Transportation needs - non-medical: Not on file   Occupational History     Not on file   Tobacco Use     Smoking status: Never Smoker     Smokeless tobacco: Never Used   Substance and Sexual Activity     Alcohol use: Yes     Alcohol/week: 0.0 oz     Drug use: No     Sexual activity: Yes     Partners: Male     Birth control/protection: None   Other Topics Concern     Parent/sibling w/ CABG, MI or angioplasty before 65F 55M? Not Asked   Social History Narrative     Not on file       REVIEW OF SYSTEMS: Patient Supplied Answers to Review of Systems   ENT ROS 3/1/2018   Ears, Nose, Throat Sore throat       The remainder of the 10 point ROS is negative    PHYSICIAL EXAMINATION:  Constitutional: The patient was well-groomed and in no acute distress.   Skin: Warm and pink.  Psychiatric: The patient's affect was calm, cooperative, and appropriate.   Respiratory: Breathing comfortably without stridor or exertion of accessory muscles.  Eyes: Pupils were equal and reactive. Extraocular movement intact.   Head: Normocephalic and atraumatic. No lesions or scars.  Ears: Both ears examined under the microscope and shows both TMs to be intact and in good position.  Middle ears look to be air-filled.  There is slight retraction to the TM and I can see no movement with Valsalva maneuvers.  Nose: Sinuses were nontender. Anterior rhinoscopy revealed midline septum and absence of purulence or polyps.  Oral Cavity: Normal tongue, floor of mouth, buccal mucosa, and palate. No abnormal lymph tissue in the oropharynx.    Neck: The parotid is soft without masses. Supple with normal laryngeal and tracheal landmarks.   Lymphatic: There is no palpable lymphadenopathy or other masses in the neck.   Neurologic: Alert and oriented x 3. Cranial nerves III-XI within normal limits. Voice quality normal.  Cerebellar Function Tests:  Grossly normal    Audiogram: Audiogram reviewed shows normal hearing in the right ear, a mild mixed hearing loss on the left side.  100% discrimination bilaterally.  Subjectively she feels hearing is symmetrical.  She does have good tympanogram levels today.    IMPRESSION AND PLAN:   1. History of eustachian tube dysfunction/multiple tube placements: Discussed options for placement of T tubes today.  Risk of persistent perforation, drainage, continued symptoms, unforeseen complications etc. all discussed in great detail.  She desires to proceed and T tubes completed without incident.  2. Bilateral ear pressure: Secondary to eustachian tube dysfunction.  Tubes placed, no further treatment needed.    PROCEDURE NOTE:  Pt placed supine under the microscope. The right ear was examined. Under high power magnification, phenol was placed on the inferior quadrant. Myringotomy then made with myringotomy knife.  A T-tube was then placed followed by antibiotic drops.   The left ear was inspected.  Myringotomy again made with the myringotomy blade.  A T-tube was placed through the myringotomy site followed by drops. The procedure was terminated. Pt released to their own care.    Patient will follow-up in 1 year, sooner if any concerns or problems.      Thank you very much for the opportunity to participate in the care of your patient.    Rick L Nissen MD

## 2019-01-04 ENCOUNTER — OFFICE VISIT (OUTPATIENT)
Dept: PSYCHOLOGY | Facility: CLINIC | Age: 44
End: 2019-01-04
Payer: COMMERCIAL

## 2019-01-04 DIAGNOSIS — F33.9 EPISODE OF RECURRENT MAJOR DEPRESSIVE DISORDER, UNSPECIFIED DEPRESSION EPISODE SEVERITY (H): ICD-10-CM

## 2019-01-04 DIAGNOSIS — F41.1 GAD (GENERALIZED ANXIETY DISORDER): Primary | ICD-10-CM

## 2019-01-04 NOTE — PROGRESS NOTES
"  Name:  Shauna Galdamez  Mrn: [id number]  Date of visit: 2019    PSYCHOLOGY OUTPATIENT VISIT NOTE       Treatment plan: 18  PRESENTING PROBLEMS/SYMPTOMS:    Anxiety increasing to very high in March,  I can t let go of worry;  with associated insomnia, poor concentration,  racing mind,   shaking on the inside.   Depressed mood., uncertain when episode began but significant by 2018.  Anhedonia significantly impacting work function,  I didn t want to get up and go to work.    Insomnia and noted needing at least 8hrs sleep/night.  Energy improving, very low in Spring.  Self esteem lower in Spring and improving. [kids = Gavin (11yo), Shaquille (boy) 10yo, Zaria (5yo); Bro () = Aldo;  = Derrick]  INTERVENTION AND RESPONSE:  Cognitive Behavioral therapy, individual.  Presented with some relief around 1wk with younger kids, calmer, H on trip with [place].  B getting hemp pipe and telling pt  He wants to \"kill\" her and \"stab\" her.  commication with H around this, later B apologized.  B's episode of aggression after remove electronics: holes in walls etc.  Sadness re loss of family she wanted, feelings around B's behavior, wanting to stay \"connected\" with B; hiding sharps when B makes threats.  Connections to trauma around bro's death, \"solace\" in staying connected with bro to end.  Addictions and not able to \"fix\" realizing this could happen with son but needing solace around this sadness.  Importance of finding this solace and strategies/tools. \"dialectic\" of sad but smile.   Plan for therapy: tools and addressing dialectic.    [Previous visit:  H valuing \"stoicism\" and pt being stoic.  Her sense of anger \"pretty new, not a key part of childhood.\"  Importance of anger and space to express it.  previous visit: tendency to \"bite my tongue\" and this coming up in current visit.) ]  ASSESSMENT: slightly sad, appearing to hold back emotions.  Significant loss around all children (adopted) likely having trauma " "hx.  Triggering sadness re bro's addiciton and related death; not being able to \"fix.\"  Anxiety around this.  Continuing context:  Hx with mom's high emotions, hiding her own emotions so \"mom won't be sad,\" anger towards mom.  Belief: Love is having no boundaries. Memory of Aldo, never had boundaries and love, best friend, no regrets.  Shaquille's adoption right after J's suicide, H telling her she's \"grieved enough\" and her anger.Brother alcoholic and mental health problems,   in .  Dad also alcoholic and \"sober\" since .  Mom  going crazy  with anxiety,  personality changes and episodes of rage.  Mother in law has terminal cancer.  3 kids adopted from [place], behavioral and emotional problems and receiving therapies. Following [employer] program (shakes as meal replacement) and noted history of problems with overweight. Severe TMJ history:  TMJ hx, severe, 12yo-24yo, chronic pain.  Fear of pain returning.  Taking fluoxetine, 40mg/day.    Mental Status Assessment:  Appearance:   Appropriate   Eye Contact:   Good   Psychomotor Behavior: Normal body tense  Attitude:   Cooperative   Orientation:   All  Speech   Rate / Production: Normal    Volume:  Normal   Mood:    Range: dysphoric  and worried, tense  Thought Content:  Clear   Thought Form:  Coherent  Logical   Insight:    Fair     DIAGNOSIS:  SUKHI. MDD recurrent unspecified    PLAN:    Patient to schedule followup visit.       Total time spent equals 55 minutes, individual psychotherapy.             "

## 2019-01-08 ENCOUNTER — OFFICE VISIT (OUTPATIENT)
Dept: PSYCHOLOGY | Facility: CLINIC | Age: 44
End: 2019-01-08
Payer: COMMERCIAL

## 2019-01-08 DIAGNOSIS — F33.9 EPISODE OF RECURRENT MAJOR DEPRESSIVE DISORDER, UNSPECIFIED DEPRESSION EPISODE SEVERITY (H): Primary | ICD-10-CM

## 2019-01-08 DIAGNOSIS — F41.1 GAD (GENERALIZED ANXIETY DISORDER): ICD-10-CM

## 2019-01-08 NOTE — PROGRESS NOTES
"  Name:  Shauna Galdamez  Mrn: [id number]  Date of visit: 2019    PSYCHOLOGY OUTPATIENT VISIT NOTE       Treatment plan: 18  PRESENTING PROBLEMS/SYMPTOMS:    Anxiety increasing to very high in March,  I can t let go of worry;  with associated insomnia, poor concentration,  racing mind,   shaking on the inside.   Depressed mood., uncertain when episode began but significant by 2018.  Anhedonia significantly impacting work function,  I didn t want to get up and go to work.    Insomnia and noted needing at least 8hrs sleep/night.  Energy improving, very low in Spring.  Self esteem lower in Spring and improving. [kids = Gavin (13yo), Shaquille (boy) 8yo, Zaria (5yo); Bro () = Aldo;  = Derrick]  INTERVENTION AND RESPONSE:  Cognitive Behavioral therapy, individual.  Presented with uncertainty around free time today: to-do list or movie. Discussed and \"push on through\" pattern.  To-do and \"brain shut down, body shut down.\"  Wanting to focus on Aldo, his bday, his death, frozen in car, suicide?  He'll beat it and then naivete around this lost (e.g. Love will conquer all).  Time with J donato and beautiful.  Hole with death.  Also dad's addiction and anger; J's addiction and worry, try to fix. Never talked about dad and his \"hurting everyone else.\"  Now becoming support for others through her losses: infertility, death, addiction; want to support but not.  Fears that Gavin will become an addict.  Question: How to manage thoughts/emotions around J and dad?  Solace.    [Previous visit:  H valuing \"stoicism\" and pt being stoic.  Her sense of anger \"pretty new, not a key part of childhood.\"  Importance of anger and space to express it.  previous visit: tendency to \"bite my tongue\" and this coming up in current visit.) ]  ASSESSMENT: Overtly sad and crying throughout visit, complicated grief, DIONICIO  .    not being able to \"fix.\"  Anxiety and sadness around this, possible anger. Pattern: pushing on through; " "may have truncated grief process. Continuing context:  Hx with mom's high emotions, hiding her own emotions so \"mom won't be sad,\" anger towards mom.  Belief: Love is having no boundaries. Memory of Aldo, never had boundaries and love, best friend, no regrets.  Shaquille's adoption right after J's suicide, H telling her she's \"grieved enough\" and her anger.Brother alcoholic and mental health problems,   in .  Dad also alcoholic and \"sober\" since .  Mom  going crazy  with anxiety,  personality changes and episodes of rage.  Mother in law has terminal cancer.  3 kids adopted from [place], behavioral and emotional problems and receiving therapies. Following [employer] program (shakes as meal replacement) and noted history of problems with overweight. Severe TMJ history:  TMJ hx, severe, 12yo-24yo, chronic pain.  Fear of pain returning.  Taking fluoxetine, 40mg/day.    Mental Status Assessment:  Appearance:   Appropriate   Eye Contact:   Good   Psychomotor Behavior: Normal body tense  Attitude:   Cooperative   Orientation:   All  Speech   Rate / Production: Normal    Volume:  Normal   Mood:   Sad and tearful, worry  Thought Content:  Clear   Thought Form:  Coherent  Logical   Insight:    Fair     DIAGNOSIS:   MDD recurrent unspecified; SUKHI.    PLAN:    Patient to schedule followup visit.       Total time spent equals 55 minutes, individual psychotherapy.             "

## 2019-01-22 ENCOUNTER — OFFICE VISIT (OUTPATIENT)
Dept: PSYCHOLOGY | Facility: CLINIC | Age: 44
End: 2019-01-22
Payer: COMMERCIAL

## 2019-01-22 DIAGNOSIS — F41.1 GAD (GENERALIZED ANXIETY DISORDER): ICD-10-CM

## 2019-01-22 DIAGNOSIS — F33.9 EPISODE OF RECURRENT MAJOR DEPRESSIVE DISORDER, UNSPECIFIED DEPRESSION EPISODE SEVERITY (H): Primary | ICD-10-CM

## 2019-01-22 NOTE — PROGRESS NOTES
"  Name:  Shauna Galdamez  Mrn: [id number]  Date of visit: 2019    PSYCHOLOGY OUTPATIENT VISIT NOTE       Treatment plan: 18  PRESENTING PROBLEMS/SYMPTOMS:    Anxiety high,  I can t let go of worry;  with associated insomnia, poor concentration,  racing mind,   shaking on the inside.   Depressed mood., uncertain when episode began but significant by 2018.  Anhedonia significantly impacting work function,  I didn t want to get up and go to work.    Insomnia and noted needing at least 8hrs sleep/night.  Energy improving, very low in Spring.  Self esteem lower in Spring and improving. [kids = Gavin (13yo), Shaquille (boy) 10yo, Zaria (3yo); Bro () = Aldo;  = Derrick] INTERVENTION AND RESPONSE:  Cognitive Behavioral therapy, individual.  Presented with uncle  (like a grandpa) and much worry around B's behaviors, continuing threatening violence towards self and parents.  Discussed and importance of immediate action, unsafe.  Pt tearful and in agreement.  Provided education around need for inpatient and/or calling police if B refuses or more threats.  Difficulties around these options regarding her emotions and wishes for family harmony. Pt recognized \"unsafe\" situation for all.  \"why me.\"  Supported her emotions. How to talk with H around this plan, his collaboration.  Pt expressed plan: visit with B's therapist after this visit, plan for inpatient.  Consulting with M's therapist for more options for inpatient if this plan not successful.  Pt expressed agreement with this plan and need for immediate action.    [Previous visit:  [facility].  H valuing \"stoicism\" and pt being stoic.  Her sense of anger \"pretty new, not a key part of childhood.\"  Importance of anger and space to express it.  previous visit: tendency to \"bite my tongue\" and this coming up in current visit.) ]  ASSESSMENT: Overtly sad and crying throughout visit,  not being able to \"fix.\"  Open and recognizing need for inpatient for B " "while also quite sad that it has come to this point.  While she has had some denial around the need, today she is realizing the severity of the situaiton. Continuing context:  Hx with mom's high emotions, hiding her own emotions so \"mom won't be sad,\" anger towards mom.  Belief: Love is having no boundaries. Memory of Aldo, never had boundaries and love, best friend, no regrets.  Shaquille's adoption right after J's suicide, H telling her she's \"grieved enough\" and her anger.Brother alcoholic and mental health problems,   in .  Dad also alcoholic and \"sober\" since .  Mom  going crazy  with anxiety,  personality changes and episodes of rage.  Mother in law has terminal cancer.  3 kids adopted from [place], behavioral and emotional problems and receiving therapies. Following [employer] program (shakes as meal replacement) and noted history of problems with overweight. Severe TMJ history:  TMJ hx, severe, 12yo-22yo, chronic pain.  Fear of pain returning.  Taking fluoxetine, 40mg/day.    Mental Status Assessment:  Appearance:   Appropriate   Eye Contact:   Good   Psychomotor Behavior: Normal body tense  Attitude:   Cooperative   Orientation:   All  Speech   Rate / Production: Normal    Volume:  Normal   Mood:   Sad and tearful, worry  Thought Content:  Clear   Thought Form:  Coherent  Logical   Insight:    Fair     DIAGNOSIS:   MDD recurrent unspecified; SUKHI.    PLAN:    Patient to schedule followup visit.       Total time spent equals 55 minutes, individual psychotherapy.             "

## 2019-01-29 ENCOUNTER — OFFICE VISIT (OUTPATIENT)
Dept: PSYCHOLOGY | Facility: CLINIC | Age: 44
End: 2019-01-29
Payer: COMMERCIAL

## 2019-01-29 DIAGNOSIS — F41.1 GAD (GENERALIZED ANXIETY DISORDER): ICD-10-CM

## 2019-01-29 DIAGNOSIS — F33.9 EPISODE OF RECURRENT MAJOR DEPRESSIVE DISORDER, UNSPECIFIED DEPRESSION EPISODE SEVERITY (H): Primary | ICD-10-CM

## 2019-01-29 NOTE — PROGRESS NOTES
"  Name:  Shauna Galdamez  Mrn: [id number]  Date of visit: 2019    PSYCHOLOGY OUTPATIENT VISIT NOTE       Treatment plan: 18  PRESENTING PROBLEMS/SYMPTOMS:    Anxiety high,  I can t let go of worry;  with associated insomnia, poor concentration,  racing mind,   shaking on the inside.   Depressed mood., uncertain when episode began but significant by 2018.  Anhedonia significantly impacting work function,  I didn t want to get up and go to work.    Insomnia and noted needing at least 8hrs sleep/night.  Energy improving, very low in Spring.  Self esteem lower in Spring and improving. [kids = Gavin (13yo), Shaquille (boy) 10yo, Zaria (3yo); Bro () = Aldo;  = Derrick] INTERVENTION AND RESPONSE:  Cognitive Behavioral therapy, individual.  Presented with update on B, coordinating stepped plan with [facility] with day tx and DBT options. Plan to bring to ER for hospitalizations if safety issue. New psychiatrist?  Got resources and helplines for self  And child resource (text line) for B.  Met with his teachers and Shaquille's.  Importance of updating them on issues with B.  Also locks now of bathroom doors, safe rooms.  Reinforced progress and organized plan.  Her resources identified, social connection, planning, finding resources, chocolate.  WAnting to be \"just me in my body\" but S and M always physically on me.  \"love it\" but need for space also.  Discussed and reinforced, talk with M's therapist re how to work with M on self soothing, time outs etc.  Expressed confidence she could do this.    [Previous visit:  Tracey.  H valuing \"stoicism\" and pt being stoic.  Her sense of anger \"pretty new, not a key part of childhood.\"  Importance of anger and space to express it.  previous visit: tendency to \"bite my tongue\" and this coming up in current visit.) ]  ASSESSMENT: More emotionally calm at visit, reporting plan for working with B and more confidence and support around this.  Continuing context:  Hx " "with mom's high emotions, hiding her own emotions so \"mom won't be sad,\" anger towards mom.  Belief: Love is having no boundaries. Memory of Aldo, never had boundaries and love, best friend, no regrets.  Shaquille's adoption right after J's suicide, H telling her she's \"grieved enough\" and her anger.Brother alcoholic and mental health problems,   in .  Dad also alcoholic and \"sober\" since .  Mom  going crazy  with anxiety,  personality changes and episodes of rage.  Mother in law has terminal cancer.  3 kids adopted from [place], behavioral and emotional problems and receiving therapies. Following [employer] program (shakes as meal replacement) and noted history of problems with overweight. Severe TMJ history:  TMJ hx, severe, 14yo-24yo, chronic pain.  Fear of pain returning.  Taking fluoxetine, 40mg/day.    Mental Status Assessment:  Appearance:   Appropriate   Eye Contact:   Good   Psychomotor Behavior: Normal body tense  Attitude:   Cooperative   Orientation:   All  Speech   Rate / Production: Normal    Volume:  Normal   Mood:   Worry and tendency to laugh/smile when distressed.  Thought Content:  Clear   Thought Form:  Coherent  Logical   Insight:    Fair     DIAGNOSIS:   MDD recurrent unspecified; SUKHI.    PLAN:    Patient to schedule followup visit.       Total time spent equals 55 minutes, individual psychotherapy.             "

## 2019-02-27 ENCOUNTER — OFFICE VISIT (OUTPATIENT)
Dept: PSYCHOLOGY | Facility: CLINIC | Age: 44
End: 2019-02-27
Payer: COMMERCIAL

## 2019-02-27 DIAGNOSIS — F41.1 GAD (GENERALIZED ANXIETY DISORDER): Primary | ICD-10-CM

## 2019-02-27 NOTE — PROGRESS NOTES
"  Name:  Shauna Galdamez  Mrn: [id number]  Date of visit: 2019    PSYCHOLOGY OUTPATIENT VISIT NOTE       Treatment plan: 18  PRESENTING PROBLEMS/SYMPTOMS:    Anxiety high,  I can t let go of worry;  with associated insomnia, poor concentration,  racing mind,   shaking on the inside.   Depressed mood., uncertain when episode began but significant by 2018.  Anhedonia significantly impacting work function,  I didn t want to get up and go to work.    Insomnia and noted needing at least 8hrs sleep/night.  Energy improving, very low in Spring.  Self esteem lower in Spring and improving. [kids = Gavin (11yo), Shaquille (boy) 8yo, Zaria (5yo); Bro () = Aldo;  = Derrick] INTERVENTION AND RESPONSE:  Cognitive Behavioral therapy, individual.  Presented with updates: B calmer and caretaking for B, positive.  Got puppy, positive.  Zaria doing better.  Feeling \"even keel with moments of happy.\"  Some problems with H, minor communication.  Mindfulness check and \"bitting tongue, hurts.\"  THen, concern re puppy \"taken away from his mom\" parallels with adopted kids, trying to support dog's emotional health.  Her family with addiction: keep things private, hide, protect.  Mom's \"paranoia\" and anxiety, attempts to shield kids.  Tongue more relaxed with \"talking about it.\"  Plan to work more with this.    [Previous visit:  [facility].  H valuing \"stoicism\" and pt being stoic.  Her sense of anger \"pretty new, not a key part of childhood.\"  Importance of anger and space to express it.  previous visit: tendency to \"bite my tongue\" and this coming up in current visit.) ]  ASSESSMENT: More emotionally calm at visit, reporting kids doing better.  Her own anxiety, tension, tongue biting in session.  Continuing context:  Hx with mom's high emotions, hiding her own emotions so \"mom won't be sad,\" anger towards mom.  Belief: Love is having no boundaries. Memory of Aldo, never had boundaries and love, best friend, no regrets. " " Shaquille's adoption right after J's suicide, H telling her she's \"grieved enough\" and her anger.Brother alcoholic and mental health problems,   in .  Dad also alcoholic and \"sober\" since .  Mom  going crazy  with anxiety,  personality changes and episodes of rage.  Mother in law has terminal cancer.  3 kids adopted from [place], behavioral and emotional problems and receiving therapies. Following [employer] program (shakes as meal replacement) and noted history of problems with overweight. Severe TMJ history:  TMJ hx, severe, 14yo-24yo, chronic pain.  Fear of pain returning.  Taking fluoxetine, 40mg/day.    Mental Status Assessment:  Appearance:   Appropriate   Eye Contact:   Good   Psychomotor Behavior: Normal body tense  Attitude:   Cooperative   Orientation:   All  Speech   Rate / Production: Normal    Volume:  Normal   Mood:   Anxiety  Thought Content:  Clear   Thought Form:  Coherent  Logical   Insight:    Fair     DIAGNOSIS:   SUKHI.  Not addressed: MDD recurrent unspecified;   PLAN:    Patient to schedule followup visit.       Total time spent equals 55 minutes, individual psychotherapy.             "

## 2019-05-03 ENCOUNTER — OFFICE VISIT (OUTPATIENT)
Dept: PSYCHOLOGY | Facility: CLINIC | Age: 44
End: 2019-05-03
Payer: COMMERCIAL

## 2019-05-03 DIAGNOSIS — F41.1 GAD (GENERALIZED ANXIETY DISORDER): Primary | ICD-10-CM

## 2019-05-03 NOTE — PROGRESS NOTES
"  Name:  Shauna Galdamez  Mrn: [id number]  Date of visit: 5/3/2019    PSYCHOLOGY OUTPATIENT VISIT NOTE       Treatment plan: 18  PRESENTING PROBLEMS/SYMPTOMS:    Anxiety high,  I can t let go of worry;  with associated insomnia, poor concentration,  racing mind,   shaking on the inside.   Depressed mood., uncertain when episode began but significant by 2018.  Anhedonia significantly impacting work function,  I didn t want to get up and go to work.    Insomnia and noted needing at least 8hrs sleep/night.  Energy improving, very low in Spring.  Self esteem lower in Spring and improving. [kids = Gavin (11yo), Shaquille (boy) 8yo, Zaria (3yo); Bro () = Aldo;  = Derrick] INTERVENTION AND RESPONSE:  Cognitive Behavioral therapy, individual.  Presented with updates: work, kids, mom.  Discussed and little time talking re self.  Importance of time and focus for self.  Putting self 5th and option of moving self up priority.  Plan to also return to application for job, some fear and interest.    [Previous visit:  Tracey.  H valuing \"stoicism\" and pt being stoic.  Her sense of anger \"pretty new, not a key part of childhood.\"  Importance of anger and space to express it.  previous visit: tendency to \"bite my tongue\" and this coming up in current visit.) ]  ASSESSMENT: Focus on self difficult, auto focus on others.  Continuing context:  Hx with mom's high emotions, hiding her own emotions so \"mom won't be sad,\" anger towards mom.  Belief: Love is having no boundaries. Memory of Aldo, never had boundaries and love, best friend, no regrets.  Shaquille's adoption right after J's suicide, H telling her she's \"grieved enough\" and her anger.Brother alcoholic and mental health problems,   in .  Dad also alcoholic and \"sober\" since .  Mom  going crazy  with anxiety,  personality changes and episodes of rage.  Mother in law has terminal cancer.  3 kids adopted from [place], behavioral and emotional problems and " receiving therapies. Following [employer] program (shakes as meal replacement) and noted history of problems with overweight. Severe TMJ history:  TMJ hx, severe, 14yo-22yo, chronic pain.  Fear of pain returning.  Taking fluoxetine, 40mg/day.    Mental Status Assessment:  Appearance:   Appropriate   Eye Contact:   Good   Psychomotor Behavior: Normal  some body fidget in chair  Attitude:   Cooperative   Orientation:   All  Speech   Rate / Production: Normal    Volume:  Normal   Mood:   Anxiety  Thought Content:  Clear   Thought Form:  Coherent  Logical   Insight:    Fair     DIAGNOSIS:   SUKHI.  Not addressed: MDD recurrent unspecified;   PLAN:    Patient to schedule followup visit.       Total time spent equals 40 minutes, individual psychotherapy.

## 2019-05-10 ENCOUNTER — OFFICE VISIT (OUTPATIENT)
Dept: PSYCHOLOGY | Facility: CLINIC | Age: 44
End: 2019-05-10
Payer: COMMERCIAL

## 2019-05-10 DIAGNOSIS — F41.1 GAD (GENERALIZED ANXIETY DISORDER): Primary | ICD-10-CM

## 2019-05-10 NOTE — PROGRESS NOTES
"  Name:  Shauna Galdamez  Mrn: [id number]  Date of visit: 5/10/2019    PSYCHOLOGY OUTPATIENT VISIT NOTE       Treatment plan: 18  PRESENTING PROBLEMS/SYMPTOMS:    Anxiety high,  I can t let go of worry;  with associated insomnia, poor concentration,  racing mind,   shaking on the inside.   Depressed mood., uncertain when episode began but significant by 2018.  Anhedonia significantly impacting work function,  I didn t want to get up and go to work.    Insomnia and noted needing at least 8hrs sleep/night.  Energy improving, very low in Spring.  Self esteem lower in Spring and improving. [kids = Gavin (11yo), Shaquille (boy) 10yo, Zaria (5yo); Bro () = Aldo;  = Derrick] INTERVENTION AND RESPONSE:  Cognitive Behavioral therapy, individual.  Presented with pattern of \"problem solving\" and fix it, controlling.  Strength but overuse?  Mom \"manipulative\" and want to avoid, mom's \"meltdowns.\"  H no space for her feelings, you need to pull yourself together, get support elsewhere, bro's death.  Would have been great for H to be there.  Now boundary with H re his emotional needs.  Processed, sense of middle section holding up organs and body, tense, uncomfortable, release with breath but move to throat, go to thoughts.  Discussed as pattern and potential wisdom in body, integrate.  Plan  [Previous visit:  [facility].  H valuing \"stoicism\" and pt being stoic.  Her sense of anger \"pretty new, not a key part of childhood.\"  Importance of anger and space to express it.  previous visit: tendency to \"bite my tongue\" and this coming up in current visit.) ]  ASSESSMENT: Focus on self difficult, auto focus on others.  Appearing to tear-up but reporting no emotions.  Focused on cognitions.  Continuing context:  Hx with mom's high emotions, hiding her own emotions so \"mom won't be sad,\" anger towards mom.  Belief: Love is having no boundaries. Memory of Aldo, never had boundaries and love, best friend, no regrets.  Shaquille's " "adoption right after J's suicide, H telling her she's \"grieved enough\" and her anger.Brother alcoholic and mental health problems,   in .  Dad also alcoholic and \"sober\" since .  Mom  going crazy  with anxiety,  personality changes and episodes of rage.  Mother in law has terminal cancer.  3 kids adopted from [place], behavioral and emotional problems and receiving therapies. Following [employer] program (shakes as meal replacement) and noted history of problems with overweight. Severe TMJ history:  TMJ hx, severe, 14yo-24yo, chronic pain.  Fear of pain returning.  Taking fluoxetine, 40mg/day.    Mental Status Assessment:  Appearance:   Appropriate   Eye Contact:   Good   Psychomotor Behavior: Normal  some body fidget in chair (typical)  Attitude:   Cooperative   Orientation:   All  Speech   Rate / Production: Normal    Volume:  Normal   Mood:   Anxiety, slight sadness, anger (brief)  Thought Content:  Clear   Thought Form:  Coherent  Logical   Insight:    Fair     DIAGNOSIS:   SUKHI.  Not addressed: MDD recurrent unspecified;   PLAN:    Patient to schedule followup visit.       Total time spent equals 55 minutes, individual psychotherapy.             "

## 2019-05-24 ENCOUNTER — OFFICE VISIT (OUTPATIENT)
Dept: PSYCHOLOGY | Facility: CLINIC | Age: 44
End: 2019-05-24
Payer: COMMERCIAL

## 2019-05-24 DIAGNOSIS — F41.1 GAD (GENERALIZED ANXIETY DISORDER): Primary | ICD-10-CM

## 2019-05-24 NOTE — PROGRESS NOTES
"  Name:  Shauna Galdamez  Mrn: [id number]  Date of visit: 2019    PSYCHOLOGY OUTPATIENT VISIT NOTE       Treatment plan: 18  PRESENTING PROBLEMS/SYMPTOMS:    Anxiety high,  I can t let go of worry;  with associated insomnia, poor concentration,  racing mind,   shaking on the inside.   Depressed mood., uncertain when episode began but significant by 2018.  Anhedonia significantly impacting work function,  I didn t want to get up and go to work.    Insomnia and noted needing at least 8hrs sleep/night.  Energy improving, very low in Spring.  Self esteem lower in Spring and improving. [kids = Gavin (13yo), Shaquille (boy) 10yo, Zaria (5yo); Bro () = Aldo;  = Derrick] INTERVENTION AND RESPONSE:  Cognitive Behavioral therapy, individual.  Presented with busy, \"getting list done.\"  Mom in law dying, worry re inlaws, H, \"help his family,\" herself or \"\" H.  Hx of reminding H to get her gifts on holidays or buying her own gifts.  Mindfulness: tense back, diaphragm, wants and no time.  Tense throat, time for me. Then thinking of others.  Memory of TMJ, pain, keep jaw relaxed.  Time for me.  Wanting to give to others vs wanting to give (space) to self.  Importance of working with both.    [Previous visit:  [facility].  H valuing \"stoicism\" and pt being stoic.  Her sense of anger \"pretty new, not a key part of childhood.\"  Importance of anger and space to express it.  previous visit: tendency to \"bite my tongue\" and this coming up in current visit.) ]  ASSESSMENT: Focus on self difficult, auto focus on others.  Uncomfortable with focus on her own body, tension.  Reducing tense by thinking of others. Pattern, but comign back to tension.    Focused on cognitions, primary.  Continuing context:  Hx with mom's high emotions, hiding her own emotions so \"mom won't be sad,\" anger towards mom.  Belief: Love is having no boundaries. Memory of Aldo, never had boundaries and love, best friend, no regrets.  Shaquille's " "adoption right after J's suicide, H telling her she's \"grieved enough\" and her anger.Brother alcoholic and mental health problems,   in .  Dad also alcoholic and \"sober\" since .  Mom  going crazy  with anxiety,  personality changes and episodes of rage.  Mother in law has terminal cancer.  3 kids adopted from [place], behavioral and emotional problems and receiving therapies. Following [employer] program (shakes as meal replacement) and noted history of problems with overweight. Severe TMJ history:  TMJ hx, severe, 12yo-24yo, chronic pain.  Fear of pain returning.  Taking fluoxetine, 40mg/day.    Mental Status Assessment:  Appearance:   Appropriate   Eye Contact:   Good   Psychomotor Behavior: Normal  some body fidget in chair, leaning (typical)  Attitude:   Cooperative   Orientation:   All  Speech   Rate / Production: Normal    Volume:  Normal   Mood:   Anxiety, slight sadness, anger (brief)  Thought Content:  Clear   Thought Form:  Coherent  Logical   Insight:    Fair     DIAGNOSIS:   SUKHI.  Not addressed: MDD recurrent unspecified;   PLAN:    Patient to schedule followup visit.       Total time spent equals 50 minutes, individual psychotherapy.             "

## 2019-06-14 ENCOUNTER — OFFICE VISIT (OUTPATIENT)
Dept: PSYCHOLOGY | Facility: CLINIC | Age: 44
End: 2019-06-14
Payer: COMMERCIAL

## 2019-06-14 DIAGNOSIS — F33.9 EPISODE OF RECURRENT MAJOR DEPRESSIVE DISORDER, UNSPECIFIED DEPRESSION EPISODE SEVERITY (H): ICD-10-CM

## 2019-06-14 DIAGNOSIS — F41.1 GAD (GENERALIZED ANXIETY DISORDER): Primary | ICD-10-CM

## 2019-06-14 NOTE — PROGRESS NOTES
"  Name:  Shauna Galdamez  Mrn: [id number]  Date of visit: 2019    PSYCHOLOGY OUTPATIENT VISIT NOTE       Treatment plan: 18  PRESENTING PROBLEMS/SYMPTOMS:    Anxiety high,  I can t let go of worry;  with associated insomnia, poor concentration,  racing mind,   shaking on the inside.   Depressed mood., uncertain when episode began but significant by 2018.  Anhedonia significantly impacting work function,  I didn t want to get up and go to work.    Insomnia and noted needing at least 8hrs sleep/night.  Energy improving, very low in Spring.  Self esteem lower in Spring and improving. [kids = Gavin (11yo), Shaquille (boy) 8yo, Zaria (5yo); Bro () = Aldo;  = Derrick] INTERVENTION AND RESPONSE:  Cognitive Behavioral therapy, individual.  Mom in law, Adriana .  Beautiful goodbye.   arrangments: I picked it up, H not doing.  Also caring for kids emotions and needs \"fell on me\", supporting sis in law.  \"I take care of myself\", own needs not met by others.  \"glad\" to listen to H's grief. H not seeing others struggle, only focus on self.  Manage own grief but not leaving others to manage alone.  \"guarded\" around her feelings in session, \"I'm ok.\"  Wanting space alone with grief, not being able to say goodbye to bro, his death.  Themes of resentment, space and beauty identified.  Memory of dad's alcoholism worsening with bro's death, dad hospitalized.  Pt attending family group, \"life saving space; life giving, beautiful.\"  Importance of space for feelings, self care, grief.  Inviting therapy space for these.  Pt somewhat reluctant.  [Previous visit:  [facility].  H valuing \"stoicism\" and pt being stoic.  Her sense of anger \"pretty new, not a key part of childhood.\"  Importance of anger and space to express it.  previous visit: tendency to \"bite my tongue\" and this coming up in current visit.) ]  ASSESSMENT: Focus on self difficult, auto focus on others.  Identifying \"guarded\" around her emotions, " "complicated grief re bro's death and addiction, dad's alcoholism.  \"ok to cry\" but then holding back tears.  Uncomfortable with focus on her own body, tension.  Reducing tense by thinking of others. Pattern, but comign back to tension.    Focused on cognitions, primary.  Continuing context:  Hx with mom's high emotions, hiding her own emotions so \"mom won't be sad,\" anger towards mom.  Belief: Love is having no boundaries. Memory of Aldo, never had boundaries and love, best friend, no regrets.  Shaquille's adoption right after J's suicide, H telling her she's \"grieved enough\" and her anger.Brother alcoholic and mental health problems,   in .  Dad also alcoholic and \"sober\" since .  Mom  going crazy  with anxiety,  personality changes and episodes of rage.  Mother in law has terminal cancer.  3 kids adopted from [place], behavioral and emotional problems and receiving therapies. Following [employer] program (shakes as meal replacement) and noted history of problems with overweight. Severe TMJ history:  TMJ hx, severe, 12yo-24yo, chronic pain.  Fear of pain returning.  Taking fluoxetine, 40mg/day.    Mental Status Assessment:  Appearance:   Appropriate   Eye Contact:   Good   Psychomotor Behavior: Normal  some body fidget in chair, moments of leaning far forward  Attitude:   Cooperative   Orientation:   All  Speech   Rate / Production: Normal    Volume:  Normal   Mood:   Sadness with tears, anger (brief)  Thought Content:  Clear   Thought Form:  Coherent  Logical   Insight:    Fair     DIAGNOSIS:   SUKHI; MDD recurrent unspecified;     Not addressed:   PLAN:    Patient to schedule followup visit.       Total time spent equals 50 minutes, individual psychotherapy.             "

## 2019-07-17 ENCOUNTER — OFFICE VISIT (OUTPATIENT)
Dept: PSYCHOLOGY | Facility: CLINIC | Age: 44
End: 2019-07-17
Payer: COMMERCIAL

## 2019-07-17 DIAGNOSIS — F41.1 GAD (GENERALIZED ANXIETY DISORDER): Primary | ICD-10-CM

## 2019-07-17 DIAGNOSIS — F33.9 EPISODE OF RECURRENT MAJOR DEPRESSIVE DISORDER, UNSPECIFIED DEPRESSION EPISODE SEVERITY (H): ICD-10-CM

## 2019-07-17 NOTE — PROGRESS NOTES
"  Name:  Shauna Galdamez  Mrn: [id number]  Date of visit: 2019    PSYCHOLOGY OUTPATIENT VISIT NOTE       Treatment plan: 18  PRESENTING PROBLEMS/SYMPTOMS:    Anxiety high,  I can t let go of worry;  with associated insomnia, poor concentration,  racing mind,   shaking on the inside.   Depressed mood., uncertain when episode began but significant by 2018.  Anhedonia significantly impacting work function,  I didn t want to get up and go to work.    Insomnia and noted needing at least 8hrs sleep/night.  Energy improving, very low in Spring.  Self esteem lower in Spring and improving. [kids = Gavin (13yo), Shaquille (boy) 8yo, Zaria (5yo); Bro () = Aldo;  = Derrick]  Presented with Zaria.  INTERVENTION AND RESPONSE:  Cognitive Behavioral therapy, individual.  [pt Explained that Zaria was with her today and would sit in visit room, chair provided and Zaria agreeable with plan.]  Reported on vacation, positive.  Continuing work with online class.  Work space.  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  Set boundary with mom re discussing past history.   Discussed and \"me-time.\"    Developing coping/self care list.    [Previous visit:  [facility].  H valuing \"stoicism\" and pt being stoic.  Her sense of anger \"pretty new, not a key part of childhood.\"  Importance of anger and space to express it.  previous visit: tendency to \"bite my tongue\" and this coming up in current visit.) ]  ASSESSMENT: Focus on self difficult, auto focus on others. Open to me-time.  Zaria climbing on her cuddling, hands in pt's face.  Pt having some boundaries around this, clearly Zaria has high needs for attention and contact with mom.  Pt comforting to M as well. Continuing context:  Hx with mom's high emotions, hiding her own emotions so \"mom won't be sad,\" anger towards mom.  Belief: Love is having no boundaries. Memory of Aldo, never had boundaries and love, best friend, no regrets.  Shaquille's adoption right after J's " "suicide, H telling her she's \"grieved enough\" and her anger.Brother alcoholic and mental health problems,   in .  Dad also alcoholic and \"sober\" since .  Mom  going crazy  with anxiety,  personality changes and episodes of rage.  Mother in law has terminal cancer.  3 kids adopted from [place], behavioral and emotional problems and receiving therapies. Following [employer] program (shakes as meal replacement) and noted history of problems with overweight. Severe TMJ history:  TMJ hx, severe, 12yo-24yo, chronic pain.  Fear of pain returning.  Taking fluoxetine, 40mg/day.    Mental Status Assessment:  Appearance:   Appropriate   Eye Contact:   Good   Psychomotor Behavior: Normal    Attitude:   Cooperative   Orientation:   All  Speech   Rate / Production: Normal    Volume:  Normal   Mood:   Euthymic, daughter present  Thought Content:  Clear   Thought Form:  Coherent  Logical   Insight:    Fair     DIAGNOSIS:   SUKHI; MDD recurrent unspecified;     Not addressed:   PLAN:    Patient to schedule followup visit.       Total time spent equals 45 minutes, individual psychotherapy.             "

## 2019-10-03 ENCOUNTER — IMMUNIZATION (OUTPATIENT)
Dept: NURSING | Facility: CLINIC | Age: 44
End: 2019-10-03
Payer: COMMERCIAL

## 2019-10-03 DIAGNOSIS — Z23 NEED FOR PROPHYLACTIC VACCINATION AND INOCULATION AGAINST INFLUENZA: Primary | ICD-10-CM

## 2019-10-03 PROCEDURE — 90471 IMMUNIZATION ADMIN: CPT

## 2019-10-03 PROCEDURE — 99207 ZZC NO CHARGE NURSE ONLY: CPT

## 2019-10-03 PROCEDURE — 90686 IIV4 VACC NO PRSV 0.5 ML IM: CPT

## 2019-11-07 ENCOUNTER — HEALTH MAINTENANCE LETTER (OUTPATIENT)
Age: 44
End: 2019-11-07

## 2020-02-23 ENCOUNTER — HEALTH MAINTENANCE LETTER (OUTPATIENT)
Age: 45
End: 2020-02-23

## 2020-05-27 ENCOUNTER — TELEPHONE (OUTPATIENT)
Dept: OTOLARYNGOLOGY | Facility: CLINIC | Age: 45
End: 2020-05-27

## 2020-05-27 NOTE — TELEPHONE ENCOUNTER
[facility] Call [facility]    Phone Message    May a detailed message be left on voicemail: yes     Reason for Call: Other: Patient was returning Angela's call.  Patient is available right now, at 1:30pm, per patient.     Action Taken: Message routed to:  [facility] [place]    Travel Screening: Not Applicable

## 2020-05-27 NOTE — TELEPHONE ENCOUNTER
Pt is ok with a video visit to start. Set Pt up with 6/2/20 at 1:00 pm.     Sent additional info to her by [employer].    Angela Domínguez LPN

## 2020-05-27 NOTE — TELEPHONE ENCOUNTER
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 Yes    By : Pt of Dr Ramos and Dr Nissen    Date: 2018    Are there any new or worsening symptoms? Yes: new and worsening - unsure if she needs Order for hearing test or if you want to see her - requests to talk to you      Action Taken: Message routed to:  [facility]    Travel Screening: Not Applicable

## 2020-06-02 ENCOUNTER — VIRTUAL VISIT (OUTPATIENT)
Dept: OTOLARYNGOLOGY | Facility: CLINIC | Age: 45
End: 2020-06-02
Payer: COMMERCIAL

## 2020-06-02 ENCOUNTER — TELEPHONE (OUTPATIENT)
Dept: OTOLARYNGOLOGY | Facility: CLINIC | Age: 45
End: 2020-06-02

## 2020-06-02 VITALS — HEIGHT: 64 IN | BODY MASS INDEX: 23.9 KG/M2 | WEIGHT: 140 LBS

## 2020-06-02 DIAGNOSIS — H91.90 HEARING LOSS, UNSPECIFIED HEARING LOSS TYPE, UNSPECIFIED LATERALITY: Primary | ICD-10-CM

## 2020-06-02 DIAGNOSIS — H69.93 ETD (EUSTACHIAN TUBE DYSFUNCTION), BILATERAL: ICD-10-CM

## 2020-06-02 RX ORDER — FLUOXETINE 40 MG/1
CAPSULE ORAL
COMMUNITY
Start: 2020-05-01 | End: 2023-01-02

## 2020-06-02 ASSESSMENT — MIFFLIN-ST. JEOR: SCORE: 1260.04

## 2020-06-02 ASSESSMENT — PAIN SCALES - GENERAL: PAINLEVEL: NO PAIN (0)

## 2020-06-02 NOTE — PROGRESS NOTES
"Shauna Galdamez is a 44 year old female who is being evaluated via a billable video visit.      The patient has been notified of following:     \"This video visit will be conducted via a call between you and your physician/provider. We have found that certain health care needs can be provided without the need for an in-person physical exam.  This service lets us provide the care you need with a video conversation.  If a prescription is necessary we can send it directly to your pharmacy.  If lab work is needed we can place an order for that and you can then stop by our lab to have the test done at a later time.    Video visits are billed at different rates depending on your insurance coverage.  Please reach out to your insurance provider with any questions.    If during the course of the call the physician/provider feels a video visit is not appropriate, you will not be charged for this service.\"    Patient has given verbal consent for Video visit? Yes    How would you like to obtain your AVS? Mail a copy and [employer]    Patient would like the video invitation sent by: Send to e-mail at: [email or web address]    Will anyone else be joining your video visit? No        Video-Visit Details    Type of service:  Video Visit    Video Start Time: 12:56 PM  Video End Time: 1:18 PM  Duration:  22 Minutes    Originating Location (pt. Location): Home    Distant Location (provider location):  Home     Platform used for Video Visit: [facility]       CHIEF COMPLAINT:  Hearing loss    HISTORY OF PRESENT ILLNESS: Pt is a 44 year old \"seen\" today virtually with question of hearing loss.  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  She has had problems with her ears and has needed tubes in the past, numerous tubes as a child and twice as an adult. BMT last were placed here in 2018 and T-tubes.  She has had problems with altitude changes which the tubes take care of. She feels " tubes are still in and ears doing well from that standpoint, now noticing issues with her hearing.  She has noticed left hearing worse for long time, has gotten by and CT scan in 2018 was normal.  She elected to watch and monitor rather than MRI imaging.  There is no pain or drainage, feels tinnitus is unchanged.  Feels hearing is stable, just more issues especially with virtual teaching.  She denies any dysphasia, hoarseness or facial paraesthesias      ALLERGIES:    Allergies   Allergen Reactions     Sulfa Drugs Unknown     Bupropion Rash     Annotation: rash and joint pain         HABITS: Social History    Substance and Sexual Activity      Alcohol use: Yes        Alcohol/week: 0.0 standard drinks     History   Smoking Status     Never Smoker   Smokeless Tobacco     Never Used         PAST MEDICAL HISTORY: Reviewed from chart  Past Medical History:   Diagnosis Date     NO ACTIVE PROBLEMS        FAMILY HISTORY/SOCIAL HISTORY:   Family History   Problem Relation Age of Onset     Skin Cancer Mother         BCC     Mental Illness Mother      Thyroid Disease Mother      Stomach Problem Mother      Substance Abuse Father      Melanoma No family hx of       Social History     Socioeconomic History     Marital status:      Spouse name: Not on file     Number of children: Not on file     Years of education: Not on file     Highest education level: Not on file   Occupational History     Not on file   Social Needs     Financial resource strain: Not on file     Food insecurity     Worry: Not on file     Inability: Not on file     Transportation needs     Medical: Not on file     Non-medical: Not on file   Tobacco Use     Smoking status: Never Smoker     Smokeless tobacco: Never Used   Substance and Sexual Activity     Alcohol use: Yes     Alcohol/week: 0.0 standard drinks     Drug use: No     Sexual activity: Yes     Partners: Male     Birth control/protection: None   Lifestyle     Physical activity     Days per week:  Not on file     Minutes per session: Not on file     Stress: Not on file   Relationships     Social connections     Talks on phone: Not on file     Gets together: Not on file     Attends Restorationism service: Not on file     Active member of club or organization: Not on file     Attends meetings of clubs or organizations: Not on file     Relationship status: Not on file     Intimate partner violence     Fear of current or ex partner: Not on file     Emotionally abused: Not on file     Physically abused: Not on file     Forced sexual activity: Not on file   Other Topics Concern     Parent/sibling w/ CABG, MI or angioplasty before 65F 55M? Not Asked   Social History Narrative     Not on file       PHYSICIAL EXAMINATION:  None today with virtual visit and pandemic, visual appearance normal and answers questions appropriately    Audiogram:  Subjectively she feels stable from last test in 2018, reviewed with her today      IMPRESSION AND PLAN:   1. Hearing loss:  Recommend repeat audio and if stable, trial with hearing aids. She agrees, if audio changed significantly will follow up with me and consider MRI.  If stable trial with HA's at audiologist recommendations and follow up with me to check tubes in fall before she does any flying.    Thank you very much for the opportunity to participate in the care of your patient.    Rick L Nissen MD

## 2020-06-02 NOTE — LETTER
"6/2/2020        RE: Shauna Galdamez  [address]  [place] [address]     Dear Colleague,    Thank you for referring your patient, Shauna Galdamez, to the [facility] at [facility]. Please see a copy of my visit note below.    Shauna Galdamez is a 44 year old female who is being evaluated via a billable video visit.      The patient has been notified of following:     \"This video visit will be conducted via a call between you and your physician/provider. We have found that certain health care needs can be provided without the need for an in-person physical exam.  This service lets us provide the care you need with a video conversation.  If a prescription is necessary we can send it directly to your pharmacy.  If lab work is needed we can place an order for that and you can then stop by our lab to have the test done at a later time.    Video visits are billed at different rates depending on your insurance coverage.  Please reach out to your insurance provider with any questions.    If during the course of the call the physician/provider feels a video visit is not appropriate, you will not be charged for this service.\"    Patient has given verbal consent for Video visit? Yes    How would you like to obtain your AVS? Mail a copy and [employer]    Patient would like the video invitation sent by: Send to e-mail at: [email or web address]    Will anyone else be joining your video visit? No        Video-Visit Details    Type of service:  Video Visit    Video Start Time: 12:56 PM  Video End Time: 1:18 PM  Duration:  22 Minutes    Originating Location (pt. Location): Home    Distant Location (provider location):  Home     Platform used for Video Visit: [employer]       CHIEF COMPLAINT:  Hearing loss    HISTORY OF PRESENT ILLNESS: Pt is a 44 year old \"seen\" today virtually with question of hearing loss.  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  She has had problems with her ears and has needed tubes in the past, numerous tubes as a child and twice as an adult. BMT last were placed here in 2018 and T-tubes.  She has had problems with altitude changes which the tubes take care of. She feels tubes are still in and ears doing well from that standpoint, now noticing issues with her hearing.  She has noticed left hearing worse for long time, has gotten by and CT scan in 2018 was normal.  She elected to watch and monitor rather than MRI imaging.  There is no pain or drainage, feels tinnitus is unchanged.  Feels hearing is stable, just more issues especially with virtual teaching.  She denies any dysphasia, hoarseness or facial paraesthesias      ALLERGIES:    Allergies   Allergen Reactions     Sulfa Drugs Unknown     Bupropion Rash     Annotation: rash and joint pain         HABITS: Social History    Substance and Sexual Activity      Alcohol use: Yes        Alcohol/week: 0.0 standard drinks     History   Smoking Status     Never Smoker   Smokeless Tobacco     Never Used         PAST MEDICAL HISTORY: Reviewed from chart  Past Medical History:   Diagnosis Date     NO ACTIVE PROBLEMS        FAMILY HISTORY/SOCIAL HISTORY:   Family History   Problem Relation Age of Onset     Skin Cancer Mother         BCC     Mental Illness Mother      Thyroid Disease Mother      Stomach Problem Mother      Substance Abuse Father      Melanoma No family hx of       Social History     Socioeconomic History     Marital status:      Spouse name: Not on file     Number of children: Not on file     Years of education: Not on file     Highest education level: Not on file   Occupational History     Not on file   Social Needs     Financial resource strain: Not on file     Food insecurity     Worry: Not on file     Inability: Not on file     Transportation needs     Medical: Not on file     Non-medical: Not on file   Tobacco  Use     Smoking status: Never Smoker     Smokeless tobacco: Never Used   Substance and Sexual Activity     Alcohol use: Yes     Alcohol/week: 0.0 standard drinks     Drug use: No     Sexual activity: Yes     Partners: Male     Birth control/protection: None   Lifestyle     Physical activity     Days per week: Not on file     Minutes per session: Not on file     Stress: Not on file   Relationships     Social connections     Talks on phone: Not on file     Gets together: Not on file     Attends Shinto service: Not on file     Active member of club or organization: Not on file     Attends meetings of clubs or organizations: Not on file     Relationship status: Not on file     Intimate partner violence     Fear of current or ex partner: Not on file     Emotionally abused: Not on file     Physically abused: Not on file     Forced sexual activity: Not on file   Other Topics Concern     Parent/sibling w/ CABG, MI or angioplasty before 65F 55M? Not Asked   Social History Narrative     Not on file       PHYSICIAL EXAMINATION:  None today with virtual visit and pandemic, visual appearance normal and answers questions appropriately    Audiogram:  Subjectively she feels stable from last test in 2018, reviewed with her today      IMPRESSION AND PLAN:   1. Hearing loss:  Recommend repeat audio and if stable, trial with hearing aids. She agrees, if audio changed significantly will follow up with me and consider MRI.  If stable trial with HA's at audiologist recommendations and follow up with me to check tubes in fall before she does any flying.    Thank you very much for the opportunity to participate in the care of your patient.    Rick L Nissen MD

## 2020-06-02 NOTE — TELEPHONE ENCOUNTER
Called patient to schedule  Audiology [facility], per 6/2 checkout note from Dr. Nissen.    Left a VM with scheduling instructions and Audiology call center number.

## 2020-07-09 ENCOUNTER — MEDICAL CORRESPONDENCE (OUTPATIENT)
Dept: HEALTH INFORMATION MANAGEMENT | Facility: CLINIC | Age: 45
End: 2020-07-09

## 2020-07-09 ENCOUNTER — TRANSFERRED RECORDS (OUTPATIENT)
Dept: HEALTH INFORMATION MANAGEMENT | Facility: CLINIC | Age: 45
End: 2020-07-09

## 2020-07-09 ENCOUNTER — NURSE TRIAGE (OUTPATIENT)
Dept: NURSING | Facility: CLINIC | Age: 45
End: 2020-07-09

## 2020-07-09 NOTE — TELEPHONE ENCOUNTER
"Shauna calls today after flipping over while tubing and hitting left ear on water hard.  She has had numerous \"ruptured ear drums\" and it felt like that.  She knew what she needed to do and not do.  She is concerned as hearing has decreased by about 50% from usual, is having clear to yellow drainage from left ear, along with admitting to dizziness if bends over.  Slight headache today.  Denies neck pain, fever.  Left ear has crusted drainage overnight.  Agrees to going to local urgent care today as no available openings with ENT.  Please follow up 7/10 at [phone]    Additional Information    Negative: Followed an ear injury    Negative: Decreased hearing with nasal allergies    Negative: Part of a cold    Negative: Follows air travel or mountain driving    Negative: Earwax, questions about    Negative: Dizziness is the main symptom    Negative: Patient sounds very sick or weak to the triager    Negative: Ringing in the ears (tinnitus) and taking aspirin and dosage sounds high (i.e., > 1500 mg/day)    Hearing loss in one or both ears of sudden onset and present now    Answer Assessment - Initial Assessment Questions  1. DESCRIPTION: \"What type of hearing problem are you having? Describe it for me.\" (e.g., complete hearing loss, partial loss)      50% hearing decrease  2. LOCATION: \"One or both ears?\" If one, ask: \"Which ear?\"      Left ear  3. SEVERITY: \"Can you hear anything?\" If so, ask: \"What can you hear?\" (e.g., ticking watch, whisper, talking)      Doesn't feel can hear much from right ear, turns head to right so can hear speech  4. ONSET: \"When did this begin?\" \"Did it start suddenly or come on gradually?\"      Friday 7/3 after flipping over when tubing and hitting the water hard  5. PATTERN: \"Does this come and go, or has it been constant since it started?\"      Constant  6. PAIN: \"Is there any pain in your ear(s)?\"  (Scale 1-10; or mild, moderate, severe)      Describes as discomfort, not pain  7. " "CAUSE: \"What do you think is causing this hearing problem?\"      From when flipped on tube  8. OTHER SYMPTOMS: \"Do you have any other symptoms?\" (e.g., dizziness, ringing in ears)      Does experience dizziness when bends over, is avoiding bending, slight headache today, denies neck pain.  Is experiencing left ear drainage, no odor, describes as clear to yellow.  Does awake with left ear dried crusty drainage.  Has tubes in both ears  9. PREGNANCY: \"Is there any chance you are pregnant?\" \"When was your last menstrual period?\"      N/A    Protocols used: HEARING LOSS-A-OH      "

## 2020-07-09 NOTE — TELEPHONE ENCOUNTER
Spoke to Dr. Mello. Will f/u tomorrow- sounds like concussion and if perforated ear drum. It will need time to heal. Let  do a referral if necessary but no mart to get in.     Angela Domínguez LPN

## 2020-07-10 ENCOUNTER — TRANSCRIBE ORDERS (OUTPATIENT)
Dept: OTHER | Age: 45
End: 2020-07-10

## 2020-07-10 DIAGNOSIS — H66.90 ACUTE OTITIS MEDIA: Primary | ICD-10-CM

## 2020-07-10 DIAGNOSIS — H72.92 PERFORATION OF TYMPANIC MEMBRANE, LEFT: ICD-10-CM

## 2020-07-13 ENCOUNTER — TELEPHONE (OUTPATIENT)
Dept: AUDIOLOGY | Facility: CLINIC | Age: 45
End: 2020-07-13

## 2020-07-13 NOTE — TELEPHONE ENCOUNTER
Called patient and LVM.    Informed her that 7/16  AuD appointment with Tasha Marinelli has been RS to 7/21 d/t family emergency. Provided AuD call center number if new appointment date/time will not work.    Patient is welcome to reschedule with any available HAE provider if necessary.

## 2020-07-14 ENCOUNTER — OFFICE VISIT (OUTPATIENT)
Dept: OTOLARYNGOLOGY | Facility: CLINIC | Age: 45
End: 2020-07-14
Payer: COMMERCIAL

## 2020-07-14 VITALS
WEIGHT: 153 LBS | SYSTOLIC BLOOD PRESSURE: 115 MMHG | HEART RATE: 87 BPM | TEMPERATURE: 97.3 F | RESPIRATION RATE: 17 BRPM | HEIGHT: 64 IN | BODY MASS INDEX: 26.12 KG/M2 | DIASTOLIC BLOOD PRESSURE: 75 MMHG

## 2020-07-14 DIAGNOSIS — H66.90 OTITIS MEDIA, UNSPECIFIED LATERALITY, UNSPECIFIED OTITIS MEDIA TYPE: Primary | ICD-10-CM

## 2020-07-14 RX ORDER — CIPROFLOXACIN AND DEXAMETHASONE 3; 1 MG/ML; MG/ML
5 SUSPENSION/ DROPS AURICULAR (OTIC) 2 TIMES DAILY
Qty: 10 ML | Refills: 0 | Status: SHIPPED | OUTPATIENT
Start: 2020-07-14 | End: 2020-08-04

## 2020-07-14 ASSESSMENT — PAIN SCALES - GENERAL: PAINLEVEL: MODERATE PAIN (4)

## 2020-07-14 ASSESSMENT — MIFFLIN-ST. JEOR: SCORE: 1319

## 2020-07-14 NOTE — PATIENT INSTRUCTIONS
1. You were seen in the ENT Clinic today by Dr. Jin .  If you have any questions or concerns after your appointment, please call   - Option 1: ENT Clinic: [phone]   - Option 2: Viry ('  Nurse): [phone]     2.   Plan to return to clinic in  3 weeks  3. Ciprodex drops 5 drops in the left ear twice a day x 14 days  4. Ear culture and fungus.    Angela Domínguez LPN  [facility] - Otolaryngology

## 2020-07-14 NOTE — LETTER
7/14/2020       RE: Shauna Galdamez  [address]  [place] [address]     Dear Colleague,    Thank you for referring your patient, Shauna Galdamez, to the [facility] EAR NOSE AND THROAT at [facility]. Please see a copy of my visit note below.    Shauna Galdamez is seen in consultation from Dr. Rick Nissen  while he is away from the clinic - she is his established patient.  She is a 44 year old female well known to Dr. Nissen with h/o bilateral ETD s/p PE tubes placement who presents for evaluation of left otorrhea. She reports that she was water tubing on 7/3/2020 and flipped off the tube rolling several times and landing on her left ear. She reports immediate left otalgia and hearing loss. Her hearing has gradually improved, but it still not back to baseline. She also reports worsening left sided otorrhea. She was evaluated by GT Martin, CNP on 7/9/2020 at which time she was prescribed amoxicillin and ofloxacin otic drops.    Past Medical History:   Diagnosis Date     NO ACTIVE PROBLEMS        Past Surgical History:   Procedure Laterality Date     ANKLE SURGERY Bilateral      ORTHOPEDIC SURGERY       PE TUBES         Family History   Problem Relation Age of Onset     Skin Cancer Mother         BCC     Mental Illness Mother      Thyroid Disease Mother      Stomach Problem Mother      Substance Abuse Father      Melanoma No family hx of        Social History     Tobacco Use     Smoking status: Never Smoker     Smokeless tobacco: Never Used   Substance Use Topics     Alcohol use: Yes     Alcohol/week: 0.0 standard drinks     Drug use: No       Patient Supplied Answers to Review of Systems   ENT ROS 3/1/2018   Ears, Nose, Throat Sore throat     The remainder of the 10 point review of systems is otherwise negative.    Physical examination:  Constitutional:  In no acute distress, appears stated age  Eyes:  Extraocular movements intact, no  spontaneous nystagmus  Ears:  Both ears examined under the microscope.  Right ear canal patent, dry, TM with diffuse tympanosclerosis, T-tube in place in anterior quadrant. Left ear canal with squamous debris and purulent otorrhea, culture obtained in clinic, debrided with suction, TM erythematous, edematous, T-tube in place in anterior quadrant, perforation surrounding T-tube with purulent otorrhea.  Respiratory:  No increased work of breathing, wheezing or stridor  Musculoskeletal:  Good upper extremity strength  Skin:  No rashes on the head and neck  Neurologic:  House Brackman 1/6 bilaterally, ambulating normally  Psychiatric:  Alert, normal affect, answering questions appropriately    Assessment and plan:  Shauna Galdamez is a 44 year old female with h/o bilateral ETD s/p bilateral PE tube placement with left acute otitis media. The patient has initiated therapy with oral amoxicillin and ofloxacin otic drops. She had a considerable burden of purulent debris in her left ear canal which may have precluded effective delivery of antimicrobial topical therapy. We recommend that she complete her course of oral amoxicillin and initiate Ciprodex otic drops for 14 days. We will call her with the results of her culture and advise regarding antimicrobial therapy if indicated. She should observe dry ear precautions. We discussed the utility of aural toilet and would like her to return to clinic should her symptoms fail to improve. We would like to see her back in 3 weeks or sooner with problems.  When Dr. Nissen is back in clinic, she will return to his care.      Júnior Pat MD  Neurotology Fellow  Department of Otolaryngology - Head and Neck Surgery  [facility]      I, Jeni Jin MD, discussed this patient with the resident/fellow at the time of consultation. I have reviewed the note, labs, imaging and am in agreement with the assessment and plan. I did not see the patient.      Again, thank  you for allowing me to participate in the care of your patient.      Sincerely,    Jeni Jin MD

## 2020-07-14 NOTE — NURSING NOTE
"Chief Complaint   Patient presents with     Consult     ear drainage left ear    Blood pressure 115/75, pulse 87, temperature 97.3  F (36.3  C), resp. rate 17, height 1.626 m (5' 4\"), weight 69.4 kg (153 lb), not currently breastfeeding.     Richard Montes De Oca LPN    "

## 2020-07-14 NOTE — PROGRESS NOTES
Shauna Galdamez is seen in consultation from Dr. Rick Nissen  while he is away from the clinic - she is his established patient.  She is a 44 year old female well known to Dr. Nissen with h/o bilateral ETD s/p PE tubes placement who presents for evaluation of left otorrhea. She reports that she was water tubing on 7/3/2020 and flipped off the tube rolling several times and landing on her left ear. She reports immediate left otalgia and hearing loss. Her hearing has gradually improved, but it still not back to baseline. She also reports worsening left sided otorrhea. She was evaluated by GT Martin, CNP on 7/9/2020 at which time she was prescribed amoxicillin and ofloxacin otic drops.    Past Medical History:   Diagnosis Date     NO ACTIVE PROBLEMS        Past Surgical History:   Procedure Laterality Date     ANKLE SURGERY Bilateral      ORTHOPEDIC SURGERY       PE TUBES         Family History   Problem Relation Age of Onset     Skin Cancer Mother         BCC     Mental Illness Mother      Thyroid Disease Mother      Stomach Problem Mother      Substance Abuse Father      Melanoma No family hx of        Social History     Tobacco Use     Smoking status: Never Smoker     Smokeless tobacco: Never Used   Substance Use Topics     Alcohol use: Yes     Alcohol/week: 0.0 standard drinks     Drug use: No       Patient Supplied Answers to Review of Systems   ENT ROS 3/1/2018   Ears, Nose, Throat Sore throat     The remainder of the 10 point review of systems is otherwise negative.    Physical examination:  Constitutional:  In no acute distress, appears stated age  Eyes:  Extraocular movements intact, no spontaneous nystagmus  Ears:  Both ears examined under the microscope.  Right ear canal patent, dry, TM with diffuse tympanosclerosis, T-tube in place in anterior quadrant. Left ear canal with squamous debris and purulent otorrhea, culture obtained in clinic, debrided with suction, TM erythematous,  edematous, T-tube in place in anterior quadrant, perforation surrounding T-tube with purulent otorrhea.  Respiratory:  No increased work of breathing, wheezing or stridor  Musculoskeletal:  Good upper extremity strength  Skin:  No rashes on the head and neck  Neurologic:  House Brackman 1/6 bilaterally, ambulating normally  Psychiatric:  Alert, normal affect, answering questions appropriately    Assessment and plan:  Shauna Galdamez is a 44 year old female with h/o bilateral ETD s/p bilateral PE tube placement with left acute otitis media. The patient has initiated therapy with oral amoxicillin and ofloxacin otic drops. She had a considerable burden of purulent debris in her left ear canal which may have precluded effective delivery of antimicrobial topical therapy. We recommend that she complete her course of oral amoxicillin and initiate Ciprodex otic drops for 14 days. We will call her with the results of her culture and advise regarding antimicrobial therapy if indicated. She should observe dry ear precautions. We discussed the utility of aural toilet and would like her to return to clinic should her symptoms fail to improve. We would like to see her back in 3 weeks or sooner with problems.  When Dr. Nissen is back in clinic, she will return to his care.      Júnior Pat MD  Neurotology Fellow  Department of Otolaryngology - Head and Neck Surgery  [facility]      I, Jeni Jin MD, discussed this patient with the resident/fellow at the time of consultation. I have reviewed the note, labs, imaging and am in agreement with the assessment and plan. I did not see the patient.

## 2020-07-17 LAB
BACTERIA SPEC CULT: NORMAL
Lab: NORMAL
SPECIMEN SOURCE: NORMAL

## 2020-08-04 ENCOUNTER — OFFICE VISIT (OUTPATIENT)
Dept: OTOLARYNGOLOGY | Facility: CLINIC | Age: 45
End: 2020-08-04
Payer: COMMERCIAL

## 2020-08-04 VITALS
HEART RATE: 79 BPM | BODY MASS INDEX: 26.29 KG/M2 | DIASTOLIC BLOOD PRESSURE: 90 MMHG | SYSTOLIC BLOOD PRESSURE: 130 MMHG | RESPIRATION RATE: 18 BRPM | HEIGHT: 64 IN | TEMPERATURE: 97.3 F | WEIGHT: 154 LBS

## 2020-08-04 DIAGNOSIS — H72.92 PERFORATION OF TYMPANIC MEMBRANE, LEFT: ICD-10-CM

## 2020-08-04 DIAGNOSIS — H69.93 ETD (EUSTACHIAN TUBE DYSFUNCTION), BILATERAL: Primary | ICD-10-CM

## 2020-08-04 DIAGNOSIS — T85.898D: ICD-10-CM

## 2020-08-04 ASSESSMENT — MIFFLIN-ST. JEOR: SCORE: 1323.54

## 2020-08-04 ASSESSMENT — PAIN SCALES - GENERAL: PAINLEVEL: NO PAIN (0)

## 2020-08-04 NOTE — PATIENT INSTRUCTIONS
1. You were seen in the ENT Clinic today by Dr. Jin.  If you have any questions or concerns after your appointment, please call   - Option 1: ENT Clinic: [phone]   - Option 2: Viry (Dr. Jin' Nurse): [phone]     2.   Plan to return to clinic in 3 months    Angela Domínguez LPN  [facility] - Otolaryngology

## 2020-08-04 NOTE — LETTER
"8/4/2020       RE: Shauna Galdamez  [address]  [place] [address]     Dear Colleague,    Thank you for referring your patient, Shauna Galdamez, to the [facility] EAR NOSE AND THROAT at [facility]. Please see a copy of my visit note below.    08/04/2020    Shauna Galdamze is seen in consultation from Dr. Rick Nissen  while he is away from the clinic - she is his established patient.  Robb Pat, our fellow, saw her as an add on patient a few weeks ago.  This is my first time to meet her in person.    She is a 44 year old woman well known to Dr. Nissen with h/o bilateral ETD s/p PE tubes placement who saw Dr. Pat for left otorrhea following a water tubing accident on 7/3/2020. The ear was debrided and she was started on Ciprodex and was already on amoxicillin when she came in to see him.    Today she reports that the ear is now dry and is feeling much better.  She is concerned that she is having more difficulty hearing in general, and has been considering amplification.  She has an audiogram scheduled for Friday.      Patient Supplied Answers to Review of Systems  [facility] ENT ROS 7/14/2020   Constitutional Problems with sleep   Neurology Dizzy spells   Ears, Nose, Throat Hearing loss, Ear pain, Ringing/noise in ears     The remainder of the 10 point review of systems is otherwise negative.    Physical examination:  BP (!) 130/90   Pulse 79   Temp 97.3  F (36.3  C)   Resp 18   Ht 1.626 m (5' 4\")   Wt 69.9 kg (154 lb)   BMI 26.43 kg/m    Constitutional:  In no acute distress, appears stated age  Eyes:  Extraocular movements intact, no spontaneous nystagmus  Ears:  Both ears examined under the microscope.    Right ear canal patent, dry, TM with diffuse tympanosclerosis, patent T-tube in place posteriorly, with debris at base  Left ear canal with healthy skin, inferior central perforation surrounding T-tube, much larger than tube, and tube is occluded " with dried debris.  With patient permission, the tube was removed as it was not in contact with the walls of the peforration.  Perforation is about 25% of the TM, posterior inferior.  Mucosa is healthy. No inflammation or granulation or effusion noted.  Respiratory:  No increased work of breathing, wheezing or stridor  Neurologic:  House Brackman 1/6 bilaterally, ambulating normally  Psychiatric:  Alert, normal affect, answering questions appropriately    Assessment and plan:  Shauna Galdamez is a 44 year old woman with h/o bilateral ETD s/p bilateral PE tube placement with left acute otitis media/externa from a water tubing accident on 7/3/2020. She completed a course of oral amoxicillin and initiated Ciprodex otic drops for 14 days. The infection has resolved. There is a 25% posterior inferior central dry perforation.  The tube was completely occluded and smaller than the hole.  The tube was removed.  Would permit 3 months for healing and then determination if tube replacement will be needed at that time.  She has an audiogram already scheduled for this Friday and was interested in amplification prior to this incident.  She is going to continue on that path for now, knowing that hearing may improve some if the perforation gets smaller over time.    Her next visit will be with Dr. Nissen in 3 months.    Jeni Jin MD  Otology & Neurotology  [facility]

## 2020-08-04 NOTE — PROGRESS NOTES
"08/04/2020    Shauna Galdamez is seen in consultation from Dr. Rick Nissen  while he is away from the clinic - she is his established patient.  Robb Bi, our fellow, saw her as an add on patient a few weeks ago.  This is my first time to meet her in person.    She is a 44 year old woman well known to Dr. Nissen with h/o bilateral ETD s/p PE tubes placement who saw Dr. Pat for left otorrhea following a water tubing accident on 7/3/2020. The ear was debrided and she was started on Ciprodex and was already on amoxicillin when she came in to see him.    Today she reports that the ear is now dry and is feeling much better.  She is concerned that she is having more difficulty hearing in general, and has been considering amplification.  She has an audiogram scheduled for Friday.      Patient Supplied Answers to Review of Systems  [facility] E[facility] ROS 7/14/2020   Constitutional Problems with sleep   Neurology Dizzy spells   Ears, Nose, Throat Hearing loss, Ear pain, Ringing/noise in ears     The remainder of the 10 point review of systems is otherwise negative.    Physical examination:  BP (!) 130/90   Pulse 79   Temp 97.3  F (36.3  C)   Resp 18   Ht 1.626 m (5' 4\")   Wt 69.9 kg (154 lb)   BMI 26.43 kg/m    Constitutional:  In no acute distress, appears stated age  Eyes:  Extraocular movements intact, no spontaneous nystagmus  Ears:  Both ears examined under the microscope.    Right ear canal patent, dry, TM with diffuse tympanosclerosis, patent T-tube in place posteriorly, with debris at base  Left ear canal with healthy skin, inferior central perforation surrounding T-tube, much larger than tube, and tube is occluded with dried debris.  With patient permission, the tube was removed as it was not in contact with the walls of the peforration.  Perforation is about 25% of the TM, posterior inferior.  Mucosa is healthy. No inflammation or granulation or effusion noted.  Respiratory:  No increased work of breathing, " wheezing or stridor  Neurologic:  House Brackman 1/6 bilaterally, ambulating normally  Psychiatric:  Alert, normal affect, answering questions appropriately    Assessment and plan:  Shauna Galdamez is a 44 year old woman with h/o bilateral ETD s/p bilateral PE tube placement with left acute otitis media/externa from a water tubing accident on 7/3/2020. She completed a course of oral amoxicillin and initiated Ciprodex otic drops for 14 days. The infection has resolved. There is a 25% posterior inferior central dry perforation.  The tube was completely occluded and smaller than the hole.  The tube was removed.  Would permit 3 months for healing and then determination if tube replacement will be needed at that time.  She has an audiogram already scheduled for this Friday and was interested in amplification prior to this incident.  She is going to continue on that path for now, knowing that hearing may improve some if the perforation gets smaller over time.    Her next visit will be with Dr. Nissen in 3 months.    Jeni Jin MD  Otology & Neurotology  [facility]

## 2020-08-04 NOTE — NURSING NOTE
"Chief Complaint   Patient presents with     RECHECK     follow up      Blood pressure (!) 130/90, pulse 79, temperature 97.3  F (36.3  C), resp. rate 18, height 1.626 m (5' 4\"), weight 69.9 kg (154 lb), not currently breastfeeding.    Richard Montes De Oca LPN    "

## 2020-08-07 ENCOUNTER — OFFICE VISIT (OUTPATIENT)
Dept: AUDIOLOGY | Facility: CLINIC | Age: 45
End: 2020-08-07
Payer: COMMERCIAL

## 2020-08-07 ENCOUNTER — MEDICAL CORRESPONDENCE (OUTPATIENT)
Dept: HEALTH INFORMATION MANAGEMENT | Facility: CLINIC | Age: 45
End: 2020-08-07

## 2020-08-07 DIAGNOSIS — H90.72 MIXED CONDUCTIVE AND SENSORINEURAL HEARING LOSS OF LEFT EAR WITH UNRESTRICTED HEARING OF RIGHT EAR: Primary | ICD-10-CM

## 2020-08-07 NOTE — PROGRESS NOTES
AUDIOLOGY REPORT    SUBJECTIVE:  Shauna Galdamez is a 44 year old female who was seen in the Audiology Clinic at the [facility]  for audiologic evaluation and hearing aid consultation, referred by Rick L Nissen, MD.  The patient has been seen previously in this clinic on 12/18/2018 for assessment and results indicated normal hearing right ear and a mixed hearing loss left ear.  Her history is significant for bilateral PE tubes, no other ear surgeries reported. The patient reports a recent water tubing incident which results in decreased hearing in the left ear and an ear infection, she reports that it has cleared up with treatment but she feels her hearing is still diminished on the left side.  She reports the left tube was taken out by ENT earlier this week. The patient denies  bilateral tinnitus, bilateral otalgia, bilateral drainage, bilateral aural fullness, and dizziness.  The patient notes difficulty with communication in a variety of listening situations.     OBJECTIVE:  Otoscopic exam indicates a tube in the right ear and a perforation in the left ear    Pure Tone Thresholds assessed using conventional audiometry with good reliability from 250-8000 Hz bilaterally using circumaural headphones (inserts were used for masking)    RIGHT:  Normal at all frequencies tested    LEFT:    Moderate to profound mixed hearing loss    Tympanogram:    RIGHT: large ear canal volume consistent with patent PE tubes    LEFT:   large ear canal volume consistent with tympanic membrane perforation    Reflexes (reported by stimulus ear):   Did not test due to tube and perforation    Speech Reception Threshold:    RIGHT: 20 dB HL    LEFT:   55 dB HL    Word Recognition Score:     RIGHT: 100% at 55 dB HL using NU-6 recorded word list.    LEFT:   92% at 95 dB HL using NU-6 recorded word list.    Patient is a hearing aid candidate. Patient would like to move forward with a hearing  aid evaluation today.Therefore, the patient was presented with different options for amplification to help aid in communication. Discussed styles, levels of technology and monaural vs. binaural fitting. She is a professor in the communication studies department at the [employer], she reports difficulty hearing students in the classroom and noted that it was challenging to hear students over the computer when they had to transition to distance learning. She reports the will be doing a hybrid model for teaching this fall. She reports she does get drainage in the left ear. She has an iPhone and so the henny was of interest to her. [place] Rechargeable technology was not very important to her.    The hearing aid mutually chosen were:  Left: ReSound Linx Quattro 7 BTE with slim tube  COLOR: 15 (glossy black)  BATTERY SIZE: 13  EARMOLD/TIPS: canal lock  CANAL/ LENGTH: 1 slim tube    Otoscopy revealed left perforation.  Discussed risks of earmold impression including material going by otoblock. She expressed understanding and wanted to proceed. A left earmold was taken without incident.      ASSESSMENT:   Compared to patient's previous audiogram dated 12/18/18, hearing has significantly decreased in the left ear. Today s results were discussed with the patient in detail.     Reviewed purchase information and warranty information with patient. The 45 day trial period was explained to patient. The patient was given a copy of the [facility] consumer brochure on purchasing hearing instruments. Patient risk factors have been provided to the patient in writing prior to the sale of the hearing aid per FDA regulation. The risk factors are also available in the User Instructional Booklet to be presented on the day of the hearing aid fitting. Hearing aid ordered. Hearing aid evaluation completed.    PLAN:  Patient was counseled regarding hearing loss  and impact on communication. Patient is a good candidate for amplification at this time.  Handout on good communication strategies, and hearing aid use was given to patient. It is recommended that the patient monitor her hearing status annually, sooner if concerns arise.  Shauna is scheduled to return in 2-3 weeks for a hearing aid fitting and programming. Purchase agreement will be completed on that date. Please contact this clinic with any questions or concerns.        Marcel Moya  Audiologist  [place] License  #[id number]

## 2020-08-11 LAB
FUNGUS SPEC CULT: NORMAL
Lab: NORMAL
SPECIMEN SOURCE: NORMAL

## 2020-09-04 ENCOUNTER — OFFICE VISIT (OUTPATIENT)
Dept: AUDIOLOGY | Facility: CLINIC | Age: 45
End: 2020-09-04
Payer: COMMERCIAL

## 2020-09-04 DIAGNOSIS — H90.72 MIXED CONDUCTIVE AND SENSORINEURAL HEARING LOSS OF LEFT EAR WITH UNRESTRICTED HEARING OF RIGHT EAR: Primary | ICD-10-CM

## 2020-09-04 NOTE — PROGRESS NOTES
"AUDIOLOGY REPORT    SUBJECTIVE: Shauna Galdamez is a 45 year old female who was seen in the Audiology Clinic at the [facility] for a fitting of a left ReSound Linx Quattro 7 BTE hearing aid. Previous results have revealed a unilateral left mixed hearing loss, with normal hearing in the right ear. The patient was given medical clearance to pursue amplification by Rick Nissen, MD.. The patient is a new user of hearing aids.    OBJECTIVE: The hearing aid conformity evaluation was completed.The hearing aids were placed and they provided a good fit. In an effort to minimize close contact during the Covid-19 pandemic, simulated real-ear measurements were completed on the Finsphere system and were a good match to NAL-NL1 target with soft sounds audible, moderate sounds comfortable, and loud sounds below discomfort. UCLs are verified through maximum power output measures and demonstrate appropriate limiting of loud inputs. Shauna was oriented to proper hearing aid use, care, cleaning (no water, dry brush), batteries (size 13, insertion/removal, toxicity, low-battery signal), aid insertion/removal, user booklet, warranty information, storage cases, and other hearing aid details. The patient confirmed understanding of hearing aid use and care, and showed proper insertion of hearing aid and batteries while in the office today. The patient reported her own voice was \"echoish\" and causing a \"vibration\" in her head, loud level input gain was decreased 6 dB and she reported good sound quality with her own voice. To allow her more time to adjust to hearing with a hearing aid, auto-acclimatize was set to 85% and will increase to 100% over 4 weeks. Shauna reported good volume and sound quality today.   Hearing aids were programmed as follows:  Program1:All-Around    EAR(S) FIT: Left  HEARING AID MODEL NAME: ReSound Linx 3D 7  HEARING AID STYLE: Behind-the-ear  EARMOLDS/TIP: slim tube with " canal lock  SERIAL NUMBERS: Left:: [id number]  WARRANTY END DATE: 9/14/2023  Volume control is active. Hearing aid was paired with her iPhone today and a review of how to use the phone henny and stream was performed. She expressed understanding.    ASSESSMENT: A left hearing aid was fit today. Verification measures were performed. Shauna signed the Hearing Aid Purchase Agreement and was given a copy, as well as details on her hearing aids. Patient was counseled that exact out of pocket amounts cannot be determined for hearing aid claims being sent to insurance. Any insurance coverage information presented to the patient is an estimate only, and is not a guarantee of payment. Patient has been advised to check with their own insurance.    PLAN:Shauna will return for follow-up in 2-3 weeks for a hearing aid review appointment. Please call this clinic with questions regarding today s appointment.    Marcel Moya  Audiologist  [place] License  #[id number]

## 2020-10-15 ENCOUNTER — OFFICE VISIT (OUTPATIENT)
Dept: AUDIOLOGY | Facility: CLINIC | Age: 45
End: 2020-10-15
Payer: COMMERCIAL

## 2020-10-15 DIAGNOSIS — H90.72 MIXED CONDUCTIVE AND SENSORINEURAL HEARING LOSS OF LEFT EAR WITH UNRESTRICTED HEARING OF RIGHT EAR: Primary | ICD-10-CM

## 2020-10-15 PROCEDURE — 99207 PR ASSESSMENT FOR HEARING AID: CPT | Performed by: AUDIOLOGIST

## 2020-10-15 NOTE — PROGRESS NOTES
"AUDIOLOGY REPORT    SUBJECTIVE:Shauna Galdamez is a 45 year old female who was seen in the Audiology Clinic at the [facility] on 10/15/2020  for a follow-up check regarding the fitting of new hearing aids. Previous results have revealed left mixed hearing loss with normal hearing in the right ear.  The patient has been seen previously in this clinic and was fit with a left ReSound Linx Quattro 7 BTE hearing aid with custom earmold on 9/4/2020.  Shauna reports some concerns regarding the fit of the device - she reports an \"ache\" in the ear after wearing the device for a while and would like it to sit closer to her head. She reports good sound quality and does not want any programming changes made today.    OBJECTIVE:   Based on patient report, the following changes were made;The slim tube was changed to a size 0 which she felt sat closer to her head.  The earmold was thinned out a bit for her to try. She was also provided another slim tube with a small power dome to try and see if that felt better in her ear. She was shown how to change slim tubes and felt comfortable doing so at home.  The slim tube with dome was tried in office and no feedback occurred. She expressed understanding. She will contact the clinic should she want the earmold remade as well.    Reviewed 45 day trial period, care, cleaning (no water, dry brush), batteries (size 13) insertion/removal, toxicity, low-battery signal), aid insertion/removal, volume adjustment (if applicable), user booklet, warranty information, storage cases, and other hearing aid details.     A review of the iPhone capabilities with the hearing aid were reviewed and she expressed understanding.    No charge visit today (in warranty hearing aid check).     ASSESSMENT: A follow-up appointment for hearing aid fitting was completed today. Changes to hearing aid was completed as outlined above.     PLAN:Shauna will return for follow-up as " needed, in 2-3 weeks for another appointemnt to check on progress.. Please call this clinic with any questions regarding today s appointment.    Marcel Moya  Audiologist  [place] License  #[id number]

## 2020-11-17 ENCOUNTER — TELEPHONE (OUTPATIENT)
Dept: OTOLARYNGOLOGY | Facility: CLINIC | Age: 45
End: 2020-11-17

## 2020-11-17 ENCOUNTER — OFFICE VISIT (OUTPATIENT)
Dept: OTOLARYNGOLOGY | Facility: CLINIC | Age: 45
End: 2020-11-17
Payer: COMMERCIAL

## 2020-11-17 ENCOUNTER — OFFICE VISIT (OUTPATIENT)
Dept: AUDIOLOGY | Facility: CLINIC | Age: 45
End: 2020-11-17
Payer: COMMERCIAL

## 2020-11-17 VITALS
RESPIRATION RATE: 16 BRPM | DIASTOLIC BLOOD PRESSURE: 87 MMHG | HEIGHT: 64 IN | HEART RATE: 73 BPM | SYSTOLIC BLOOD PRESSURE: 132 MMHG | OXYGEN SATURATION: 100 % | WEIGHT: 155 LBS | TEMPERATURE: 99.1 F | BODY MASS INDEX: 26.46 KG/M2

## 2020-11-17 DIAGNOSIS — T16.1XXA FOREIGN BODY OF RIGHT EAR, INITIAL ENCOUNTER: Primary | ICD-10-CM

## 2020-11-17 DIAGNOSIS — H72.93 PERFORATION OF TYMPANIC MEMBRANE, BILATERAL: ICD-10-CM

## 2020-11-17 DIAGNOSIS — H90.72 MIXED CONDUCTIVE AND SENSORINEURAL HEARING LOSS OF LEFT EAR WITH UNRESTRICTED HEARING OF RIGHT EAR: Primary | ICD-10-CM

## 2020-11-17 DIAGNOSIS — H90.72 MIXED CONDUCTIVE AND SENSORINEURAL HEARING LOSS OF LEFT EAR, UNSPECIFIED HEARING STATUS ON CONTRALATERAL SIDE: ICD-10-CM

## 2020-11-17 PROCEDURE — 92504 EAR MICROSCOPY EXAMINATION: CPT | Performed by: OTOLARYNGOLOGY

## 2020-11-17 PROCEDURE — 92557 COMPREHENSIVE HEARING TEST: CPT | Performed by: AUDIOLOGIST

## 2020-11-17 PROCEDURE — 99213 OFFICE O/P EST LOW 20 MIN: CPT | Mod: 25 | Performed by: OTOLARYNGOLOGY

## 2020-11-17 PROCEDURE — 92567 TYMPANOMETRY: CPT | Performed by: AUDIOLOGIST

## 2020-11-17 ASSESSMENT — MIFFLIN-ST. JEOR: SCORE: 1323.08

## 2020-11-17 ASSESSMENT — PAIN SCALES - GENERAL: PAINLEVEL: NO PAIN (0)

## 2020-11-17 NOTE — LETTER
11/17/2020       RE: Shauna Galdamez  [address]  [place] [address]     Dear Colleague,    Thank you for referring your patient, Shauna Galdamez, to the [facility] at [facility]. Please see a copy of my visit note below.    Dear Dr. Santoyo Ref-Primary, Physician:    I had the pleasure of seeing Shauna Galdamez in followup today at the [facility].    CHIEF COMPLAINT: Ears    HISTORY OF PRESENT ILLNESS: Patient is a 45-year-old in today for follow-up.  She has had problems with her ears, had multiple tubes as a child.  She has had tubes placed on 2 occasions as an adult, the last was in 2018 and was T tubes.  She was seen in August with left ear drainage in the tube was plugged and removed.  She had a larger perforation and we were to monitor.  The drainage has cleared.  She comes in today and has not had any pain or drainage in the ears.  She has increased hearing loss in the left ear and has for some time, has a hearing aid on the left.  She feels her hearing on the right is stable.  Does have occasional bilateral tinnitus, not problematic.  There is no dizziness.  Denies any dysphagia, hoarseness, facial paresthesias.  She is flying tomorrow and comes in in case she needs to have tubes placed.    MEDICATIONS: Please refer to the detailed medication reconciliation performed by my nurse today, which I have reviewed and signed.     ALLERGIES:    Allergies   Allergen Reactions     Sulfa Drugs Unknown     Bupropion Rash     Annotation: rash and joint pain         HABITS: Social History    Substance and Sexual Activity      Alcohol use: Yes        Alcohol/week: 0.0 standard drinks     History   Smoking Status     Never Smoker   Smokeless Tobacco     Never Used         PAST MEDICAL HISTORY:  Please see today's intake form (for the remainder of the PMH) which I reviewed and  signed.  Past Medical History:   Diagnosis Date     NO ACTIVE PROBLEMS        FAMILY HISTORY/SOCIAL HISTORY:    Family History   Problem Relation Age of Onset     Skin Cancer Mother         BCC     Mental Illness Mother      Thyroid Disease Mother      Stomach Problem Mother      Substance Abuse Father      Melanoma No family hx of       Social History     Socioeconomic History     Marital status:      Spouse name: Not on file     Number of children: Not on file     Years of education: Not on file     Highest education level: Not on file   Occupational History     Not on file   Social Needs     Financial resource strain: Not on file     Food insecurity     Worry: Not on file     Inability: Not on file     Transportation needs     Medical: Not on file     Non-medical: Not on file   Tobacco Use     Smoking status: Never Smoker     Smokeless tobacco: Never Used   Substance and Sexual Activity     Alcohol use: Yes     Alcohol/week: 0.0 standard drinks     Drug use: No     Sexual activity: Yes     Partners: Male     Birth control/protection: None   Lifestyle     Physical activity     Days per week: Not on file     Minutes per session: Not on file     Stress: Not on file   Relationships     Social connections     Talks on phone: Not on file     Gets together: Not on file     Attends Jehovah's witness service: Not on file     Active member of club or organization: Not on file     Attends meetings of clubs or organizations: Not on file     Relationship status: Not on file     Intimate partner violence     Fear of current or ex partner: Not on file     Emotionally abused: Not on file     Physically abused: Not on file     Forced sexual activity: Not on file   Other Topics Concern     Parent/sibling w/ CABG, MI or angioplasty before 65F 55M? Not Asked   Social History Narrative     Not on file       REVIEW OF SYSTEMS: [unfilled]    The remainder of the 10 point ROS is negative    PHYSICIAL EXAMINATION:  Constitutional: The  patient was well-groomed and in no acute distress.   Skin: Warm and pink.  Psychiatric: The patient's affect was calm, cooperative, and appropriate.   Respiratory: Breathing comfortably without stridor or exertion of accessory muscles.  Eyes: Pupils were equal and reactive. Extraocular movement intact.   Head: Normocephalic and atraumatic. No lesions or scars.  Ears: Both ears examined and she does have significant debris in the right ear.  She is placed supine on the microscope and under high-power magnification the right side was examined.  Debris was mobilized and the T-tube has extruded and is lying in the lateral canal along with debris.  This was removed.  She has a persistent inferior perforation that is clean and dry today.  Left ear was examined and shows cerumen which was cleaned with curette.  She has an inferior perforation present again clean and dry with no discharge noted.  No worrisome changes, no squamous ingrowth noted.  Nose: Sinuses were nontender. Anterior rhinoscopy revealed midline septum and absence of purulence or polyps.  Oral Cavity: Normal tongue, floor of mouth, buccal mucosa, and palate. No abnormal lymph tissue in the oropharynx.   Neck: The parotid is soft without masses. Supple with normal laryngeal and tracheal landmarks.   Lymphatic: There is no palpable lymphadenopathy or other masses in the neck.   Neurologic: Alert and oriented x 3. Cranial nerves III-XI within normal limits. Voice quality normal.  Cerebellar Function Tests:  Grossly normal    Audiogram: Audiogram performed shows mild conductive loss in the right ear still within normal range.  Left ear shows a mixed loss that is fairly stable from previous audiogram.  She has excellent discrimination in both ears at 100% on the right and 92% on the left.    IMPRESSION AND PLAN:   1. Bilateral tympanic membrane perforation: Both are clean and dry today.  No treatment needed, monitor.  Discussed her upcoming flight tomorrow and  she should be fine with the perforations.  Monitor with follow-up in 1 year, sooner if any concerns or problems.  2. Foreign body in right ear canal: Removed today, no further treatment needed, monitor.  3. Left mixed hearing loss: Continue to maximize benefit from hearing aid.  She may be interested in surgery in the left ear to improve the hearing with illuminating the conductive loss.  She understands hearing aid would still be beneficial but she would like to improve it if possible.  For now we will going to monitor with Covid and will see her back in 1 year, sooner with any concerns or problems.    Thank you very much for the opportunity to participate in the care of your patient.    Rick L Nissen MD

## 2020-11-17 NOTE — NURSING NOTE
"Chief Complaint   Patient presents with     RECHECK     PE tubes      Blood pressure 132/87, pulse 73, temperature 99.1  F (37.3  C), resp. rate 16, height 1.626 m (5' 4\"), weight 70.3 kg (155 lb), SpO2 100 %, not currently breastfeeding.    Richard Montes De Oca LPN    "

## 2020-11-17 NOTE — PATIENT INSTRUCTIONS
1. You were seen in the ENT Clinic today by Dr. Jin.  If you have any questions or concerns after your appointment, please call   - Option 1: ENT Clinic: [phone]   - Option 2: Angela (Dr. Jin' Nurse): [phone]  2.   Plan to return to clinic in 1 year with audio    Angela Domínguez LPN  [facility] - Otolaryngology

## 2020-11-17 NOTE — PROGRESS NOTES
AUDIOLOGY REPORT    SUMMARY: Audiology visit completed. See audiogram for results.      RECOMMENDATIONS: Follow-up with ENT.      Delroy Bautista, CCC-A  Licensed Audiologist  [place] #[id number]

## 2020-11-17 NOTE — PROGRESS NOTES
Dear Dr. Santoyo Ref-Primary, Physician:    I had the pleasure of seeing Shauna Galdamez in followup today at the [facility] Otol[facility].    CHIEF COMPLAINT: Ears    HISTORY OF PRESENT ILLNESS: Patient is a 45-year-old in today for follow-up.  She has had problems with her ears, had multiple tubes as a child.  She has had tubes placed on 2 occasions as an adult, the last was in 2018 and was T tubes.  She was seen in August with left ear drainage in the tube was plugged and removed.  She had a larger perforation and we were to monitor.  The drainage has cleared.  She comes in today and has not had any pain or drainage in the ears.  She has increased hearing loss in the left ear and has for some time, has a hearing aid on the left.  She feels her hearing on the right is stable.  Does have occasional bilateral tinnitus, not problematic.  There is no dizziness.  Denies any dysphagia, hoarseness, facial paresthesias.  She is flying tomorrow and comes in in case she needs to have tubes placed.    MEDICATIONS: Please refer to the detailed medication reconciliation performed by my nurse today, which I have reviewed and signed.     ALLERGIES:    Allergies   Allergen Reactions     Sulfa Drugs Unknown     Bupropion Rash     Annotation: rash and joint pain         HABITS: Social History    Substance and Sexual Activity      Alcohol use: Yes        Alcohol/week: 0.0 standard drinks     History   Smoking Status     Never Smoker   Smokeless Tobacco     Never Used         PAST MEDICAL HISTORY:  Please see today's intake form (for the remainder of the PMH) which I reviewed and signed.  Past Medical History:   Diagnosis Date     NO ACTIVE PROBLEMS        FAMILY HISTORY/SOCIAL HISTORY:    Family History   Problem Relation Age of Onset     Skin Cancer Mother         BCC     Mental Illness Mother      Thyroid Disease Mother      Stomach Problem Mother      Substance Abuse Father      Melanoma No family hx of        Social History     Socioeconomic History     Marital status:      Spouse name: Not on file     Number of children: Not on file     Years of education: Not on file     Highest education level: Not on file   Occupational History     Not on file   Social Needs     Financial resource strain: Not on file     Food insecurity     Worry: Not on file     Inability: Not on file     Transportation needs     Medical: Not on file     Non-medical: Not on file   Tobacco Use     Smoking status: Never Smoker     Smokeless tobacco: Never Used   Substance and Sexual Activity     Alcohol use: Yes     Alcohol/week: 0.0 standard drinks     Drug use: No     Sexual activity: Yes     Partners: Male     Birth control/protection: None   Lifestyle     Physical activity     Days per week: Not on file     Minutes per session: Not on file     Stress: Not on file   Relationships     Social connections     Talks on phone: Not on file     Gets together: Not on file     Attends [employer] service: Not on file     Active member of club or organization: Not on file     Attends meetings of clubs or organizations: Not on file     Relationship status: Not on file     Intimate partner violence     Fear of current or ex partner: Not on file     Emotionally abused: Not on file     Physically abused: Not on file     Forced sexual activity: Not on file   Other Topics Concern     Parent/sibling w/ CABG, MI or angioplasty before 65F 55M? Not Asked   Social History Narrative     Not on file       REVIEW OF SYSTEMS: [unfilled]    The remainder of the 10 point ROS is negative    PHYSICIAL EXAMINATION:  Constitutional: The patient was well-groomed and in no acute distress.   Skin: Warm and pink.  Psychiatric: The patient's affect was calm, cooperative, and appropriate.   Respiratory: Breathing comfortably without stridor or exertion of accessory muscles.  Eyes: Pupils were equal and reactive. Extraocular movement intact.   Head: Normocephalic and  atraumatic. No lesions or scars.  Ears: Both ears examined and she does have significant debris in the right ear.  She is placed supine on the microscope and under high-power magnification the right side was examined.  Debris was mobilized and the T-tube has extruded and is lying in the lateral canal along with debris.  This was removed.  She has a persistent inferior perforation that is clean and dry today.  Left ear was examined and shows cerumen which was cleaned with curette.  She has an inferior perforation present again clean and dry with no discharge noted.  No worrisome changes, no squamous ingrowth noted.  Nose: Sinuses were nontender. Anterior rhinoscopy revealed midline septum and absence of purulence or polyps.  Oral Cavity: Normal tongue, floor of mouth, buccal mucosa, and palate. No abnormal lymph tissue in the oropharynx.   Neck: The parotid is soft without masses. Supple with normal laryngeal and tracheal landmarks.   Lymphatic: There is no palpable lymphadenopathy or other masses in the neck.   Neurologic: Alert and oriented x 3. Cranial nerves III-XI within normal limits. Voice quality normal.  Cerebellar Function Tests:  Grossly normal    Audiogram: Audiogram performed shows mild conductive loss in the right ear still within normal range.  Left ear shows a mixed loss that is fairly stable from previous audiogram.  She has excellent discrimination in both ears at 100% on the right and 92% on the left.    IMPRESSION AND PLAN:   1. Bilateral tympanic membrane perforation: Both are clean and dry today.  No treatment needed, monitor.  Discussed her upcoming flight tomorrow and she should be fine with the perforations.  Monitor with follow-up in 1 year, sooner if any concerns or problems.  2. Foreign body in right ear canal: Removed today, no further treatment needed, monitor.  3. Left mixed hearing loss: Continue to maximize benefit from hearing aid.  She may be interested in surgery in the left ear to  improve the hearing with illuminating the conductive loss.  She understands hearing aid would still be beneficial but she would like to improve it if possible.  For now we will going to monitor with Covid and will see her back in 1 year, sooner with any concerns or problems.    Thank you very much for the opportunity to participate in the care of your patient.    Rick L Nissen MD

## 2020-11-17 NOTE — TELEPHONE ENCOUNTER
Patient call returned - patient to [facility] appointment with her ENT MD next avaliable PRN for tube placement. Sophie Perry RN

## 2020-11-29 ENCOUNTER — HEALTH MAINTENANCE LETTER (OUTPATIENT)
Age: 45
End: 2020-11-29

## 2021-01-27 ENCOUNTER — NURSE TRIAGE (OUTPATIENT)
Dept: OTOLARYNGOLOGY | Facility: CLINIC | Age: 46
End: 2021-01-27

## 2021-02-14 ENCOUNTER — HEALTH MAINTENANCE LETTER (OUTPATIENT)
Age: 46
End: 2021-02-14

## 2021-03-16 ENCOUNTER — OFFICE VISIT (OUTPATIENT)
Dept: AUDIOLOGY | Facility: CLINIC | Age: 46
End: 2021-03-16
Payer: COMMERCIAL

## 2021-03-16 DIAGNOSIS — H90.72 MIXED HEARING LOSS OF LEFT EAR: Primary | ICD-10-CM

## 2021-03-16 PROCEDURE — 99207 PR ASSESSMENT FOR HEARING AID: CPT | Performed by: AUDIOLOGIST

## 2021-03-17 NOTE — PROGRESS NOTES
AUDIOLOGY REPORT    SUBJECTIVE:  Shauna Galdamez is a 45 year old female who was seen in the Audiology Clinic at the was seen in Audiology at the [facility] on 3/16/2021  for a hearing aid check. Previous results have revealed left mixed hearing loss with normal hearing in the right ear.  The patient has been seen previously in this clinic and was fit with a left ReSound Linx Quattro 7 BTE hearing aid with custom earmold on 9/4/2020.  Shauna reports she does not wear the hearing aid consistently. She also reported concerns that she is not able to hear the phone when she holds it up to her ear. She expressed a desire to use a non-custom earmold.    OBJECTIVE:   We discussed the amount of hearing loss she has and that in order to provide her with an appropriate amount of gain that she needs a custom earmold. A non-custom ear piece would likely cause excessive feedback.    We discussed using her phone with the hearing aid and that if she holds it up to her ear she needs to hold it at the top of her ear where the microphones are. We practiced and she reported that she was not able to hear well that way. She is able to hear when it is on speaker, when using the Bluetooth or when using the other ear so we advised that she try a different method of using the phone that she knows works or continue practicing with placement.    No charge visit today (in warranty hearing aid check).    ASSESSMENT:   A no charge hearing aid check was completed. Changes to hearing aid was completed as outlined above.     PLAN:  Shauna will return for follow-up as needed, or at least every 6-9 months for cleaning and assessment of hearing aid.  . Please call this clinic with any questions regarding today s appointment.    Lalito Burt, [facility]  Licensed Audiologist  [place] License #[id number]

## 2021-03-22 ENCOUNTER — ANCILLARY PROCEDURE (OUTPATIENT)
Dept: MAMMOGRAPHY | Facility: CLINIC | Age: 46
End: 2021-03-22
Attending: PHYSICIAN ASSISTANT
Payer: COMMERCIAL

## 2021-03-22 DIAGNOSIS — Z12.31 VISIT FOR SCREENING MAMMOGRAM: ICD-10-CM

## 2021-03-22 PROCEDURE — 77063 BREAST TOMOSYNTHESIS BI: CPT | Mod: GC

## 2021-03-22 PROCEDURE — 77067 SCR MAMMO BI INCL CAD: CPT | Mod: GC

## 2021-03-26 NOTE — PROGRESS NOTES
SUBJECTIVE:                                                   Shauna Galdamez is a 45 year old female who presents to clinic today for the following health issue(s):  Patient presents with:  Menopausal Sx: c/o insomnia, weight gain, irregular menses or no periods some months, and joint pain; denies hot flashes and night sweats    HPI:  Patient presents to clinic today with multiple concerns.  She believes they may all be associated with perimenopause.  She states that her cycles in the past have been regular however she feels like over the last couple years they have been becoming a little bit shorter between bleeds.  Her last cycle was much different.  She was having symptoms of developing menstrual cycle at the regular time but did not menstruate until about a week or 2 later.  Her flow is typically 3 to 4 days, however she states that her flow has been lighter recently.  She states historically she has at times had heavy cycles.  Recently her cycles are not reportedly excessively heavy.  She does not require anything for contraception.  Her  has sperm issue.  She has never been pregnant.    She additionally reports some joint pains.  She feels like these may be tied to her cycle.  She has been noticing this more prominently over the last 4 to 5 months.  She denies any swelling or redness.  She states that this is not present on a daily basis.  She does feel like her symptoms are worse however when she is near her menstrual cycle.  She has not noticed any differences with cooler versus warmer temps.  She does feel like the heat does help a little bit.  Additionally she has tried a compression machine and feels like this helps with some of the wrist discomfort she is having.  She states anti-inflammatory medications helped minimally.  She has used an CBD tincture and felt like this helped more than the anti-inflammatory medication. She does do yoga and walking.  She does feel like her joint pain  "has impacted her ability to perform this, and she has gained some weight.   She additionally reports insomnia.  She states she is able to fall asleep, but midway through the night she awakens and is awake for 1 to 2 hours.  She states that she used to be happy with this time.  Because she could read a book and have some time to herself, but now she feels like this is \"catching up to her\".  She would like to be able to have a full night of sleep.  She does state after the 1 to 2 hours she is able to fall back asleep.      She denies any hot flashes.  She reports significant vaginal dryness.  She does use a lubricant.  She additionally reports irregular spotting.  She feels like this has developed more recently as well.  She denies pelvic pain.  She has no change in bowel or bladder habits.    Patient's last menstrual period was 2021 (exact date)..     Patient is sexually active, .  Using none for contraception.    reports that she has never smoked. She has never used smokeless tobacco.    STD testing offered?  Declined    Health maintenance updated:  Unsure on last pap smear, states it was at [facility] within the last 5 years. Not sure if HPV was done     PHQ-2 Score:     PHQ-2 (  [employer]) 2020   Q1: Little interest or pleasure in doing things 0 0   Q2: Feeling down, depressed or hopeless 0 0   PHQ-2 Score 0 0     Problem list and histories reviewed & adjusted, as indicated.  Additional history: as documented.    Patient Active Problem List   Diagnosis     Family history of melanoma     Multiple benign nevi     Cherry angioma     Solar lentiginosis     Dermal and epidermal nevus     Pilar cyst     Keloid scar     Anxiety     Closed fracture of tibia     PMS (premenstrual syndrome)     Episode of recurrent major depressive disorder, unspecified depression episode severity (H)     Past Surgical History:   Procedure Laterality Date     ANKLE SURGERY Bilateral      ORTHOPEDIC SURGERY       PE " "TUBES        Social History     Tobacco Use     Smoking status: Never Smoker     Smokeless tobacco: Never Used   Substance Use Topics     Alcohol use: Yes     Alcohol/week: 0.0 standard drinks      Problem (# of Occurrences) Relation (Name,Age of Onset)    Heart Disease (1) Maternal Grandmother    Hypertension (1) Mother    Mental Illness (1) Mother    No Known Problems (5) Sister, Brother, Maternal Grandfather, Paternal Grandmother, Other    Skin Cancer (1) Mother: BCC    Stomach Problem (1) Mother    Substance Abuse (1) Father    Thyroid Disease (1) Mother       Negative family history of: Melanoma            Current Outpatient Medications   Medication Sig     FLUoxetine (PROZAC) 40 MG capsule      norethindrone (MICRONOR) 0.35 MG tablet Take 1 tablet (0.35 mg) by mouth daily     No current facility-administered medications for this visit.      Allergies   Allergen Reactions     Sulfa Drugs Unknown     Bupropion Rash     Annotation: rash and joint pain         ROS:  12 point review of systems negative other than symptoms noted below or in the HPI.  Constitutional: Fatigue and Weight Gain  Breast: Tenderness  Genitourinary: Irregular Menses, Painful [place], Spotting, Vaginal Discharge and Vaginal Dryness  Skin: Acne  Neurologic: Headaches  Musculoskeletal: Joint Pain  Psychiatric: Difficulty Sleeping    OBJECTIVE:     /76   Pulse 68   Ht 1.626 m (5' 4\")   Wt 67.9 kg (149 lb 12.8 oz)   LMP 04/01/2021 (Exact Date)   BMI 25.71 kg/m    Body mass index is 25.71 kg/m .    Exam:  Constitutional:  Appearance: Well nourished, well developed alert, in no acute distress  Neurologic:  Mental Status:  Oriented X3.  Normal strength and tone, sensory exam grossly normal, mentation intact and speech normal.    Psychiatric:  Mentation appears normal and affect normal/bright.  Pelvic Exam:  External Genitalia:     Normal appearance for age, no discharge present, no tenderness present, no inflammatory lesions present, " color normal  Vagina:     Normal vaginal vault without central or paravaginal defects, no discharge present, no inflammatory lesions present, no masses present  Bladder:     Nontender to palpation  Urethra:   Urethral Body:  Urethra palpation normal, urethra structural support normal   Urethral Meatus:  No erythema or lesions present  Cervix:     Appearance healthy, no lesions present, nontender to palpation, vaginal blood present.  Uterus:     Uterus: firm, normal sized and nontender, midplane in position.   Adnexa:     No adnexal tenderness present, no adnexal masses present  Perineum:     Perineum within normal limits, no evidence of trauma, no rashes or skin lesions present  Anus:     Anus within normal limits, no hemorrhoids present  Inguinal Lymph Nodes:     No lymphadenopathy present  Pubic Hair:     Normal pubic hair distribution for age  Genitalia and Groin:     No rashes present, no lesions present, no areas of discoloration, no masses present       In-Clinic Test Results:  No results found for this or any previous visit (from the past 24 hour(s)).    ASSESSMENT/PLAN:                                                        ICD-10-CM    1. Irregular periods  N92.6 TSH with free T4 reflex     norethindrone (MICRONOR) 0.35 MG tablet   2. Arthralgia, unspecified joint  M25.50 CBC with platelets   3. Encounter for screening for cervical cancer   Z12.4 Pap imaged thin layer screen with HPV - recommended age 30 - 65     HPV High Risk Types DNA Cervical     I reviewed with patient typical symptoms associated with perimenopause.  We discussed both hormonal and physical changes that occur in perimenopause.  We briefly reviewed some medical treatment options for perimenopause.  We discussed risks and benefits of these.  We discussed menopause.org website.  We discussed a trial of progesterone only pill to see if this will mitigate some of the symptoms she is experiencing.  Patient is agreeable.  Pap smear is updated  today.  We discussed the association of sleep disturbances and perimenopause.  We reviewed sleep hygiene today.  Regarding her joint pains, there is association with her cycles so there is possiblity this is completely related to perimenopause.  I would like her to also establish with internist to investigate other possible etiologies of the pain.    (45 minutes was spent on the date of the encounter doing chart review, review of outside records, review and interpretation of pertinent test results, history and exam, documentation, patient counseling, and further activities as noted above.)      Yesenia Dobbs MD  [facility] FOR [facility] [place]

## 2021-04-02 ENCOUNTER — OFFICE VISIT (OUTPATIENT)
Dept: OBGYN | Facility: CLINIC | Age: 46
End: 2021-04-02
Payer: COMMERCIAL

## 2021-04-02 VITALS
WEIGHT: 149.8 LBS | HEIGHT: 64 IN | HEART RATE: 68 BPM | BODY MASS INDEX: 25.57 KG/M2 | DIASTOLIC BLOOD PRESSURE: 76 MMHG | SYSTOLIC BLOOD PRESSURE: 114 MMHG

## 2021-04-02 DIAGNOSIS — N92.6 IRREGULAR PERIODS: Primary | ICD-10-CM

## 2021-04-02 DIAGNOSIS — Z12.4 ENCOUNTER FOR SCREENING FOR CERVICAL CANCER: ICD-10-CM

## 2021-04-02 DIAGNOSIS — M25.50 ARTHRALGIA, UNSPECIFIED JOINT: ICD-10-CM

## 2021-04-02 PROBLEM — F33.9 EPISODE OF RECURRENT MAJOR DEPRESSIVE DISORDER, UNSPECIFIED DEPRESSION EPISODE SEVERITY (H): Status: ACTIVE | Noted: 2021-04-02

## 2021-04-02 LAB
ERYTHROCYTE [DISTWIDTH] IN BLOOD BY AUTOMATED COUNT: 12.1 % (ref 10–15)
HCT VFR BLD AUTO: 39.5 % (ref 35–47)
HGB BLD-MCNC: 12.8 G/DL (ref 11.7–15.7)
MCH RBC QN AUTO: 29.7 PG (ref 26.5–33)
MCHC RBC AUTO-ENTMCNC: 32.4 G/DL (ref 31.5–36.5)
MCV RBC AUTO: 92 FL (ref 78–100)
PLATELET # BLD AUTO: 257 10E9/L (ref 150–450)
RBC # BLD AUTO: 4.31 10E12/L (ref 3.8–5.2)
TSH SERPL DL<=0.005 MIU/L-ACNC: 2.34 MU/L (ref 0.4–4)
WBC # BLD AUTO: 5.6 10E9/L (ref 4–11)

## 2021-04-02 PROCEDURE — G0145 SCR C/V CYTO,THINLAYER,RESCR: HCPCS | Performed by: OBSTETRICS & GYNECOLOGY

## 2021-04-02 PROCEDURE — 87624 HPV HI-RISK TYP POOLED RSLT: CPT | Performed by: OBSTETRICS & GYNECOLOGY

## 2021-04-02 PROCEDURE — 84443 ASSAY THYROID STIM HORMONE: CPT | Performed by: OBSTETRICS & GYNECOLOGY

## 2021-04-02 PROCEDURE — 36415 COLL VENOUS BLD VENIPUNCTURE: CPT | Performed by: OBSTETRICS & GYNECOLOGY

## 2021-04-02 PROCEDURE — 99204 OFFICE O/P NEW MOD 45 MIN: CPT | Performed by: OBSTETRICS & GYNECOLOGY

## 2021-04-02 PROCEDURE — 85027 COMPLETE CBC AUTOMATED: CPT | Performed by: OBSTETRICS & GYNECOLOGY

## 2021-04-02 RX ORDER — ACETAMINOPHEN AND CODEINE PHOSPHATE 120; 12 MG/5ML; MG/5ML
0.35 SOLUTION ORAL DAILY
Qty: 84 TABLET | Refills: 3 | Status: SHIPPED | OUTPATIENT
Start: 2021-04-02 | End: 2021-07-08

## 2021-04-02 ASSESSMENT — MIFFLIN-ST. JEOR: SCORE: 1299.49

## 2021-04-06 LAB
COPATH REPORT: NORMAL
PAP: NORMAL

## 2021-04-08 LAB
FINAL DIAGNOSIS: NORMAL
HPV HR 12 DNA CVX QL NAA+PROBE: NEGATIVE
HPV16 DNA SPEC QL NAA+PROBE: NEGATIVE
HPV18 DNA SPEC QL NAA+PROBE: NEGATIVE
SPECIMEN DESCRIPTION: NORMAL
SPECIMEN SOURCE CVX/VAG CYTO: NORMAL

## 2021-05-30 ENCOUNTER — RECORDS - HEALTHEAST (OUTPATIENT)
Dept: ADMINISTRATIVE | Facility: CLINIC | Age: 46
End: 2021-05-30

## 2021-06-25 NOTE — TELEPHONE ENCOUNTER
FUTURE VISIT INFORMATION      FUTURE VISIT INFORMATION:    Date: 7/8/2021    Time: 2:15PM    [place] [facility] [place] INFORMATION:    Referring provider:  Dr Rick Nissen     Referring providers clinic:  [facility] [place]     Reason for visit/diagnosis  referred by Dr. Nissen    RECORDS REQUESTED FROM:       Clinic name Comments Records Status Imaging Status   [facility] [place]  11/17/2020 note from Dr Nissen   11/17/2020 audiogram   8/4/2020 note from Dr Jin  [facility] 3/2/2018 CT Temporal Bone Epic PACS

## 2021-06-28 ENCOUNTER — TELEPHONE (OUTPATIENT)
Dept: OTOLARYNGOLOGY | Facility: CLINIC | Age: 46
End: 2021-06-28

## 2021-06-30 DIAGNOSIS — H72.93 PERFORATION OF TYMPANIC MEMBRANE, BILATERAL: Primary | ICD-10-CM

## 2021-07-06 DIAGNOSIS — H69.93 ETD (EUSTACHIAN TUBE DYSFUNCTION), BILATERAL: Primary | ICD-10-CM

## 2021-07-07 ENCOUNTER — ANCILLARY PROCEDURE (OUTPATIENT)
Dept: CT IMAGING | Facility: CLINIC | Age: 46
End: 2021-07-07
Attending: OTOLARYNGOLOGY
Payer: COMMERCIAL

## 2021-07-07 ENCOUNTER — OFFICE VISIT (OUTPATIENT)
Dept: AUDIOLOGY | Facility: CLINIC | Age: 46
End: 2021-07-07
Payer: COMMERCIAL

## 2021-07-07 DIAGNOSIS — H72.93 PERFORATION OF TYMPANIC MEMBRANE, BILATERAL: ICD-10-CM

## 2021-07-07 DIAGNOSIS — H69.93 ETD (EUSTACHIAN TUBE DYSFUNCTION), BILATERAL: ICD-10-CM

## 2021-07-07 DIAGNOSIS — H90.72 MIXED CONDUCTIVE AND SENSORINEURAL HEARING LOSS OF LEFT EAR WITH UNRESTRICTED HEARING OF RIGHT EAR: Primary | ICD-10-CM

## 2021-07-07 PROCEDURE — 92557 COMPREHENSIVE HEARING TEST: CPT | Performed by: AUDIOLOGIST

## 2021-07-07 PROCEDURE — 70480 CT ORBIT/EAR/FOSSA W/O DYE: CPT | Mod: GC | Performed by: RADIOLOGY

## 2021-07-07 PROCEDURE — 92567 TYMPANOMETRY: CPT | Performed by: AUDIOLOGIST

## 2021-07-07 NOTE — PROGRESS NOTES
AUDIOLOGY REPORT    SUMMARY: Audiology visit completed. See audiogram for results.      RECOMMENDATIONS: Follow-up with ENT.    Lalito Jackson.  Licensed Audiologist  [place] # [id number]

## 2021-07-08 ENCOUNTER — OFFICE VISIT (OUTPATIENT)
Dept: OTOLARYNGOLOGY | Facility: CLINIC | Age: 46
End: 2021-07-08
Payer: COMMERCIAL

## 2021-07-08 ENCOUNTER — PRE VISIT (OUTPATIENT)
Dept: OTOLARYNGOLOGY | Facility: CLINIC | Age: 46
End: 2021-07-08

## 2021-07-08 VITALS
DIASTOLIC BLOOD PRESSURE: 85 MMHG | SYSTOLIC BLOOD PRESSURE: 125 MMHG | TEMPERATURE: 97.5 F | WEIGHT: 152.12 LBS | HEART RATE: 83 BPM | OXYGEN SATURATION: 100 % | HEIGHT: 64 IN | BODY MASS INDEX: 25.97 KG/M2

## 2021-07-08 DIAGNOSIS — H90.A32 MIXED CONDUCTIVE AND SENSORINEURAL HEARING LOSS OF LEFT EAR WITH RESTRICTED HEARING OF RIGHT EAR: ICD-10-CM

## 2021-07-08 DIAGNOSIS — H72.93 PERFORATION OF TYMPANIC MEMBRANE, BILATERAL: Primary | ICD-10-CM

## 2021-07-08 PROCEDURE — 92504 EAR MICROSCOPY EXAMINATION: CPT | Performed by: OTOLARYNGOLOGY

## 2021-07-08 PROCEDURE — 99214 OFFICE O/P EST MOD 30 MIN: CPT | Mod: 25 | Performed by: OTOLARYNGOLOGY

## 2021-07-08 RX ORDER — CEFAZOLIN SODIUM 2 G/50ML
2 SOLUTION INTRAVENOUS
Status: CANCELLED | OUTPATIENT
Start: 2021-07-08

## 2021-07-08 RX ORDER — CEFAZOLIN SODIUM 2 G/50ML
2 SOLUTION INTRAVENOUS SEE ADMIN INSTRUCTIONS
Status: CANCELLED | OUTPATIENT
Start: 2021-07-08

## 2021-07-08 RX ORDER — DEXAMETHASONE SODIUM PHOSPHATE 4 MG/ML
10 INJECTION, SOLUTION INTRA-ARTICULAR; INTRALESIONAL; INTRAMUSCULAR; INTRAVENOUS; SOFT TISSUE ONCE
Status: CANCELLED | OUTPATIENT
Start: 2021-07-08 | End: 2021-07-08

## 2021-07-08 RX ORDER — ACETAMINOPHEN 325 MG/1
975 TABLET ORAL ONCE
Status: CANCELLED | OUTPATIENT
Start: 2021-07-08 | End: 2021-07-08

## 2021-07-08 ASSESSMENT — MIFFLIN-ST. JEOR: SCORE: 1310

## 2021-07-08 ASSESSMENT — PAIN SCALES - GENERAL: PAINLEVEL: NO PAIN (0)

## 2021-07-08 NOTE — PROGRESS NOTES
Shauna Galdamez is seen in consultation from Dr. Nissen. She is a 45 year old female being seen for eustachian tube dysfunction, left-sided hearing loss, and bilateral perforations. The patient has had multiple tube placements, and her last t-tube placement in 2018. Her left t-tube was removed in the summer as it was plugged and her right t-tube was noted to be extruded in November leaving her with bilateral perforations. She has a left conductive hearing loss and is interested in improving the loss. She otherwise has had no otorrhea, otalgia or changes in hearing on either ear.     The patient reports a recent ear trauma while swimming. She reports history of jaw problems in her 20's, but feels that this has since resolved.       Past Medical History:   Diagnosis Date     Anxiety      Depression      Past Surgical History:   Procedure Laterality Date     ANKLE SURGERY Bilateral      ORTHOPEDIC SURGERY       PE TUBES       Family History   Problem Relation Age of Onset     Skin Cancer Mother         BCC     Mental Illness Mother      Thyroid Disease Mother      Stomach Problem Mother      Hypertension Mother      Substance Abuse Father      No Known Problems Sister      No Known Problems Brother      Heart Disease Maternal Grandmother      No Known Problems Maternal Grandfather      No Known Problems Paternal Grandmother      No Known Problems Other      Melanoma No family hx of      Social History     Tobacco Use     Smoking status: Never Smoker     Smokeless tobacco: Never Used   Substance Use Topics     Alcohol use: Yes     Alcohol/week: 0.0 standard drinks     Drug use: No     Patient Supplied Answers to Review of Systems   ENT ROS 7/8/2021   Constitutional -   Neurology Headache   Ears, Nose, Throat -   The remainder of the 10 point review of systems is otherwise negative.    Physical examination:  Constitutional:  In no acute distress, appears stated age  Eyes:  Extraocular movements intact, no  spontaneous nystagmus  Ears:  Both ears examined under the microscope. Left ear canal clear, 40% inferior perforation, middle ear is clean with no evidence of infection. Right ear canal is clear, 25% inferior perforation, middle ear is clean with no evidence of infection. [place] testing midline when done on the forehead. Rinne right was bone greater than air. Rinne left was air greater than bone.  Respiratory:  No increased work of breathing, wheezing or stridor  Musculoskeletal:  Good upper extremity strength  Skin:  No rashes on the head and neck  Neurologic:  [place] 1/6 bilaterally, ambulating normally  Psychiatric:  Alert, normal affect, answering questions appropriately    Audiogram:  Audiogram was independently reviewed and compared to 3 prior audiograms. Right normal/mild upsloping to normal loss with a small conductive component, 100% speech discrimination, flat high volume tympanogram. Left severe downsloping to profound mixed loss with a mild downsloping to moderate sensorineural component, 84% speech discrimination, high volume flat tympanogram.  The 2 audiograms from 2020 are similar to the one from today. The 2018 audiogram showed a left mild primarily sensorineural loss with a notch down to 45dB at 2Khz and a rapid downslope to 75dB at 8kHz.    CT: [place] temporal bones bilaterally with well aerated mastoids and middle ears, intact ossicular chains, intact otic capsules, facial nerves in their usual positions.    Previous clinic records were independently reviewed and summarized above.    Assessment and plan:  We discussed the left ear conductive hearing loss. I discussed her recent audiogram results and recent CT with her. She is a candidate for a left cartilage tympanoplasty given her conductive hearing loss but with the understanding that, after the repair is healed, she will need another t-tube placed due to her eustachian tube dysfunction. The risks and benefits were discussed.  The risks  include but are not limited to:  Worsened hearing which may require further surgery, profound and irreversible hearing loss, dizziness, damage to the taste nerve, damage to the facial nerve, tympanic membrane perforation requiring further surgery and infection.  Postoperative restrictions were discussed. She expressed understanding and knows that she needs external aeration for her ears and would like to proceed.    Scribe Disclosure:  I, Jerri Bland, am serving as a scribe to document services personally performed by Brigida Mello MD at this visit, based upon the provider's statements to me. All documentation has been reviewed by the aforementioned provider prior to being entered into the official medical record.     The documentation recorded by the scribe accurately reflects the services I personally performed and the decisions made by me.

## 2021-07-08 NOTE — NURSING NOTE
"Chief Complaint   Patient presents with     Consult     referal from Doctor Nissen     Blood pressure 125/85, pulse 83, temperature 97.5  F (36.4  C), height 1.626 m (5' 4\"), weight 69 kg (152 lb 1.9 oz), SpO2 100 %, not currently breastfeeding.    Richard Montes De Oca LPN    "

## 2021-07-08 NOTE — PATIENT INSTRUCTIONS
1. You were seen in the ENT Clinic today by Dr. Mello. If you have any questions or concerns after your appointment, please call   - Option 1: ENT Clinic: [phone]  - Option 2: Freda (Dr. Mello's Nurse): [phone]        Angela (Dr. Mello's Nurse): [phone]     2.   Our surgery scheduler, Roselyn, will reach out to you about a surgery date. After you get your surgery date, please schedule your pre-op physical. We will call you closer to the date of your surgery to do teaching over the phone. You will also receive a surgery packet in the mail.    Freda, RN, BSN  RN Care Coordinator  [facility]- Otolaryngology

## 2021-07-09 ENCOUNTER — TELEPHONE (OUTPATIENT)
Dept: OTOLARYNGOLOGY | Facility: CLINIC | Age: 46
End: 2021-07-09

## 2021-07-09 DIAGNOSIS — Z11.59 ENCOUNTER FOR SCREENING FOR OTHER VIRAL DISEASES: ICD-10-CM

## 2021-07-09 PROBLEM — H72.93 PERFORATION OF TYMPANIC MEMBRANE, BILATERAL: Status: ACTIVE | Noted: 2021-07-09

## 2021-07-13 NOTE — TELEPHONE ENCOUNTER
Called patient to schedule surgery    Surgeon: Dr. Mello   Date of Surgery: 08/11/2021  Location of surgery: [facility]  [facility]  Post-Op Appt Date: 3 weeks    Imaging needed:  No  Discussed COVID-19 testing:  Yes  Pre-cert/Authorization completed:  No  Packet sent out: Yes 07/13/21      Aware pre-op RN will call 2-3 days prior. Will be called for covid-19 testing.

## 2021-07-28 DIAGNOSIS — H90.72 MIXED CONDUCTIVE AND SENSORINEURAL HEARING LOSS OF LEFT EAR, UNSPECIFIED HEARING STATUS ON CONTRALATERAL SIDE: Primary | ICD-10-CM

## 2021-07-28 DIAGNOSIS — H72.93 PERFORATION OF TYMPANIC MEMBRANE, BILATERAL: ICD-10-CM

## 2021-08-09 ENCOUNTER — LAB (OUTPATIENT)
Dept: LAB | Facility: CLINIC | Age: 46
End: 2021-08-09
Attending: OTOLARYNGOLOGY
Payer: COMMERCIAL

## 2021-08-09 ENCOUNTER — ANCILLARY PROCEDURE (OUTPATIENT)
Dept: MRI IMAGING | Facility: CLINIC | Age: 46
End: 2021-08-09
Attending: OTOLARYNGOLOGY
Payer: COMMERCIAL

## 2021-08-09 DIAGNOSIS — H72.93 PERFORATION OF TYMPANIC MEMBRANE, BILATERAL: ICD-10-CM

## 2021-08-09 DIAGNOSIS — H90.72 MIXED CONDUCTIVE AND SENSORINEURAL HEARING LOSS OF LEFT EAR, UNSPECIFIED HEARING STATUS ON CONTRALATERAL SIDE: ICD-10-CM

## 2021-08-09 DIAGNOSIS — Z11.59 ENCOUNTER FOR SCREENING FOR OTHER VIRAL DISEASES: ICD-10-CM

## 2021-08-09 PROCEDURE — U0005 INFEC AGEN DETEC AMPLI PROBE: HCPCS

## 2021-08-09 PROCEDURE — A9585 GADOBUTROL INJECTION: HCPCS | Performed by: RADIOLOGY

## 2021-08-09 PROCEDURE — 70543 MRI ORBT/FAC/NCK W/O &W/DYE: CPT | Mod: TC | Performed by: RADIOLOGY

## 2021-08-09 PROCEDURE — U0003 INFECTIOUS AGENT DETECTION BY NUCLEIC ACID (DNA OR RNA); SEVERE ACUTE RESPIRATORY SYNDROME CORONAVIRUS 2 (SARS-COV-2) (CORONAVIRUS DISEASE [COVID-19]), AMPLIFIED PROBE TECHNIQUE, MAKING USE OF HIGH THROUGHPUT TECHNOLOGIES AS DESCRIBED BY CMS-2020-01-R: HCPCS

## 2021-08-09 RX ORDER — GADOBUTROL 604.72 MG/ML
7.5 INJECTION INTRAVENOUS ONCE
Status: COMPLETED | OUTPATIENT
Start: 2021-08-09 | End: 2021-08-09

## 2021-08-09 RX ADMIN — GADOBUTROL 6.5 ML: 604.72 INJECTION INTRAVENOUS at 11:21

## 2021-08-10 ENCOUNTER — OFFICE VISIT (OUTPATIENT)
Dept: FAMILY MEDICINE | Facility: CLINIC | Age: 46
End: 2021-08-10
Payer: COMMERCIAL

## 2021-08-10 ENCOUNTER — ANESTHESIA EVENT (OUTPATIENT)
Dept: SURGERY | Facility: AMBULATORY SURGERY CENTER | Age: 46
End: 2021-08-10
Payer: COMMERCIAL

## 2021-08-10 ENCOUNTER — TELEPHONE (OUTPATIENT)
Dept: OTOLARYNGOLOGY | Facility: CLINIC | Age: 46
End: 2021-08-10

## 2021-08-10 ENCOUNTER — LAB (OUTPATIENT)
Dept: LAB | Facility: CLINIC | Age: 46
End: 2021-08-10
Payer: COMMERCIAL

## 2021-08-10 VITALS
TEMPERATURE: 98.7 F | OXYGEN SATURATION: 95 % | SYSTOLIC BLOOD PRESSURE: 119 MMHG | HEART RATE: 84 BPM | DIASTOLIC BLOOD PRESSURE: 79 MMHG | BODY MASS INDEX: 26.09 KG/M2 | WEIGHT: 152 LBS

## 2021-08-10 DIAGNOSIS — R45.86 MOOD CHANGES: ICD-10-CM

## 2021-08-10 DIAGNOSIS — H90.A12 CONDUCTIVE HEARING LOSS OF LEFT EAR WITH RESTRICTED HEARING OF RIGHT EAR: Primary | ICD-10-CM

## 2021-08-10 DIAGNOSIS — Z01.818 PRE-OP EXAM: ICD-10-CM

## 2021-08-10 DIAGNOSIS — D33.3 VESTIBULAR SCHWANNOMA (H): Primary | ICD-10-CM

## 2021-08-10 LAB
HGB BLD-MCNC: 12.7 G/DL (ref 11.7–15.7)
POTASSIUM BLD-SCNC: 4 MMOL/L (ref 3.4–5.3)
SARS-COV-2 RNA RESP QL NAA+PROBE: NEGATIVE
TSH SERPL DL<=0.005 MIU/L-ACNC: 1.89 MU/L (ref 0.4–4)

## 2021-08-10 PROCEDURE — 85018 HEMOGLOBIN: CPT | Performed by: PATHOLOGY

## 2021-08-10 PROCEDURE — 84443 ASSAY THYROID STIM HORMONE: CPT | Performed by: PATHOLOGY

## 2021-08-10 PROCEDURE — 84132 ASSAY OF SERUM POTASSIUM: CPT | Performed by: PATHOLOGY

## 2021-08-10 PROCEDURE — 36415 COLL VENOUS BLD VENIPUNCTURE: CPT | Performed by: PATHOLOGY

## 2021-08-10 PROCEDURE — 99214 OFFICE O/P EST MOD 30 MIN: CPT | Performed by: NURSE PRACTITIONER

## 2021-08-10 ASSESSMENT — PAIN SCALES - GENERAL: PAINLEVEL: NO PAIN (0)

## 2021-08-10 NOTE — PATIENT INSTRUCTIONS

## 2021-08-10 NOTE — NURSING NOTE
45 year old  Chief Complaint   Patient presents with     Pre-Op Exam     Pt is here for a pre op.       Blood pressure 119/79, pulse 84, temperature 98.7  F (37.1  C), temperature source Oral, weight 68.9 kg (152 lb), SpO2 95 %, not currently breastfeeding. Body mass index is 26.09 kg/m .  BP completed using cuff size:      Roseline Lagunas JOSE ENRIQUE  August 10, 2021 4:47 PM

## 2021-08-10 NOTE — TELEPHONE ENCOUNTER
I called and spoke to her about her MRI results as well as to tell her a time change to her surgery time tomorrow morning.    MRI showed a 1.2cm vestibular schwannoma on the left that does not go to the fundus and extends slightly into the cerebellopontine angle. I discussed this with her and that the tumor is still small but that this explains her sensorineural hearing loss and decreased speech discrimination. We went over treatment options including observation, stereotactic radiosurgery and microsurgical resection. Given her relatively good hearing and lack of other symptoms, recommendation was made for observation with a MRI in 6 months. We went over the natural history of the tumor and that over time she will lose her functional hearing on the left.    Recommendation was made that she still have her tympanoplasty which is scheduled for tomorrow as that will hopefully improve some of her conductive hearing loss which may help her be a better hearing aid user. She reports that she has difficulty wearing her left hearing aid for more than an hour or so due to pain but was told by Audiology that, due to the severity of her hearing loss, she needs a tight mold. She does have a lot of TMJ dysfunction and reports she has had multiple surgeries for her TMJ dysfunction. We went over that her discomfort with hearing aid usage is at least partly due to her TMJ dysfunction so she might be able to have her hearing aid turned down if her conductive hearing loss improves which may allow her to have a less snugly fit mold.    She had her questions answered and is comfortable with observation for the schwannoma. We will obtain a repeat MRI in 6 months and then yearly with an audiogram thereafter.

## 2021-08-10 NOTE — ANESTHESIA PREPROCEDURE EVALUATION
Anesthesia Pre-Procedure Evaluation    Patient: Shauna Galdamez   MRN: [id number] : 1975        Preoperative Diagnosis: Perforation of tympanic membrane, bilateral [H72.93]  Mixed conductive and sensorineural hearing 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 [H90.A32]   Procedure : Procedure(s):  TYMPANOPLASTY WITH CARTILAGE BACKING LEFT     Past Medical History:   Diagnosis Date     Anxiety      Depression       Past Surgical History:   Procedure Laterality Date     ANKLE SURGERY Bilateral      ORTHOPEDIC SURGERY       PE TUBES        Allergies   Allergen Reactions     Sulfa Drugs Unknown     Bupropion Rash     Annotation: rash and joint pain        Social History     Tobacco Use     Smoking status: Never Smoker     Smokeless tobacco: Never Used   Substance Use Topics     Alcohol use: Yes     Alcohol/week: 0.0 standard drinks      Wt Readings from Last 1 Encounters:   21 69 kg (152 lb 1.9 oz)           Physical Exam    Airway        Mallampati: II   TM distance: > 3 FB   Neck ROM: full   Mouth opening: > 3 cm    Respiratory Devices and Support         Dental       (+) implants      Cardiovascular   cardiovascular exam normal          Pulmonary   pulmonary exam normal                OUTSIDE LABS:  CBC:   Lab Results   Component Value Date    WBC 5.6 2021    HGB 12.8 2021    HCT 39.5 2021     2021     BMP: No results found for: NA, POTASSIUM, CHLORIDE, CO2, BUN, CR, GLC  COAGS: No results found for: PTT, INR, FIBR  POC: No results found for: BGM, HCG, HCGS  HEPATIC: No results found for: ALBUMIN, PROTTOTAL, ALT, AST, GGT, ALKPHOS, BILITOTAL, BILIDIRECT, ELSA  OTHER:   Lab Results   Component Value Date    TSH 2.34 2021       Anesthesia Plan    ASA Status:  2   NPO Status:  NPO Appropriate    Anesthesia Type: General.     - Airway: ETT   Induction: Intravenous, Propofol.   Maintenance: Balanced.        Consents    Anesthesia Plan(s) and associated  risks, benefits, and realistic alternatives discussed. Questions answered and patient/representative(s) expressed understanding.     - Discussed with:  Patient      - Extended Intubation/Ventilatory Support Discussed: No.      - Patient is DNR/DNI Status: No    Use of blood products discussed: No .     Postoperative Care    Pain management: IV analgesics, Oral pain medications, Multi-modal analgesia.   PONV prophylaxis: Ondansetron (or other 5HT-3), Dexamethasone or Solumedrol     Comments:    Remifentanil gtt            Jose Toledo MD

## 2021-08-10 NOTE — PROGRESS NOTES
[facility] [address] [address]  Phone: [phone]  Fax: [phone]  Primary Provider: No Ref-Primary, Physician  Pre-op Performing Provider: SYED CISNEROS    PREOPERATIVE EVALUATION:  Today's date: 8/10/2021    Shauna Galdamez is a 45 year old female who presents for a preoperative evaluation.    Surgical Information:  Surgery/Procedure: TYMPANOPLASTY WITH CARTILAGE BACKING LEFT  Surgery Location: [facility] [place]  Surgeon: Brigida Mello MD  Surgery Date: 8/11/2021  Time of Surgery: 7:15 AM  Where patient plans to recover: At home with family  Fax number for surgical facility: Note does not need to be faxed, will be available electronically in Epic.    Type of Anesthesia Anticipated: General    Subjective     HPI related to upcoming procedure:   Shauna Galdamez is seen in consultation from Dr. Nissen. She is a 45 year old female being seen for eustachian tube dysfunction, left-sided hearing loss, and bilateral perforations. The patient has had multiple tube placements, and her last t-tube placement in 2018. Her left t-tube was removed in the summer as it was plugged and her right t-tube was noted to be extruded in November leaving her with bilateral perforations. She has a left conductive hearing loss and is interested in improving the loss. She otherwise has had no otorrhea, otalgia or changes in hearing on either ear.   Assessment and plan:  We discussed the left ear conductive hearing loss. I discussed her recent audiogram results and recent CT with her. She is a candidate for a left cartilage tympanoplasty given her conductive hearing loss but with the understanding that, after the repair is healed, she will need another t-tube placed due to her eustachian tube dysfunction. The risks and benefits were discussed.  The risks include but are not limited to:  Worsened hearing which may require further surgery,  profound and irreversible hearing loss, dizziness, damage to the taste nerve, damage to the facial nerve, tympanic membrane perforation requiring further surgery and infection.  Postoperative restrictions were discussed. She expressed understanding and knows that she needs external aeration for her ears and would like to proceed.  Preop Questions 8/10/2021   1. Have you ever had a heart attack or stroke? No   2. Have you ever had surgery on your heart or blood vessels, such as a stent placement, a coronary artery bypass, or surgery on an artery in your head, neck, heart, or legs? No   3. Do you have chest pain with activity? No   4. Do you have a history of  heart failure? No   5. Do you currently have a cold, bronchitis or symptoms of other infection? No   6. Do you have a cough, shortness of breath, or wheezing? No   7. Do you or anyone in your family have previous history of blood clots? No   8. Do you or does anyone in your family have a serious bleeding problem such as prolonged bleeding following surgeries or cuts? No   9. Have you ever had problems with anemia or been told to take iron pills? No   10. Have you had any abnormal blood loss such as black, tarry or bloody stools, or abnormal vaginal bleeding? No   11. Have you ever had a blood transfusion? No   12. Are you willing to have a blood transfusion if it is medically needed before, during, or after your surgery? Yes   13. Have you or any of your relatives ever had problems with anesthesia? No   14. Do you have sleep apnea, excessive snoring or daytime drowsiness? No   15. Do you have any artifical heart valves or other implanted medical devices like a pacemaker, defibrillator, or continuous glucose monitor? No   16. Do you have artificial joints? No   17. Are you allergic to latex? No   18. Is there any chance that you may be pregnant? No       Health Care Directive:  Patient does not have a Health Care Directive or Living Will: Patient states has  Advance Directive and will bring in a copy to clinic.    Preoperative Review of :   reviewed - no record of controlled substances prescribed.      Status of Chronic Conditions:  See problem list for active medical problems.  Problems all longstanding and stable, except as noted/documented.  See ROS for pertinent symptoms related to these conditions.      Review of Systems  CONSTITUTIONAL: NEGATIVE for fever, chills, change in weight  INTEGUMENTARY/SKIN: NEGATIVE for worrisome rashes, moles or lesions  EYES: NEGATIVE for vision changes or irritation  ENT/MOUTH: NEGATIVE for ear, mouth and throat problems  RESP: NEGATIVE for significant cough or SOB  CV: NEGATIVE for chest pain, palpitations or peripheral edema  GI: NEGATIVE for nausea, abdominal pain, heartburn, or change in bowel habits  : NEGATIVE for frequency, dysuria, or hematuria  MUSCULOSKELETAL: NEGATIVE for significant arthralgias or myalgia  NEURO: NEGATIVE for weakness, dizziness or paresthesias  ENDOCRINE: NEGATIVE for temperature intolerance, skin/hair changes  HEME: NEGATIVE for bleeding problems  PSYCHIATRIC: NEGATIVE for changes in mood or affect    Patient Active Problem List    Diagnosis Date Noted     Perforation of tympanic membrane, bilateral 07/09/2021     Priority: Medium     Added automatically from request for surgery [id number]       Mixed conductive and sensorineural hearing 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 07/09/2021     Priority: Medium     Added automatically from request for surgery [id number]       Episode of recurrent major depressive disorder, unspecified depression episode severity (H) 04/02/2021     Priority: Medium     Family history of melanoma 02/06/2017     Priority: Medium     Multiple benign nevi 02/06/2017     Priority: Medium     Cherry angioma 02/06/2017     Priority: Medium     Solar lentiginosis 02/06/2017     Priority: Medium     Dermal and epidermal nevus 02/06/2017     Priority: Medium     Pilar  cyst 02/06/2017     Priority: Medium     Keloid scar 02/06/2017     Priority: Medium     Closed fracture of tibia 01/13/2016     Priority: Medium     Anxiety 04/11/2011     Priority: Medium     PMS (premenstrual syndrome) 11/17/2010     Priority: Medium      Past Medical History:   Diagnosis Date     Anxiety      Depression      Past Surgical History:   Procedure Laterality Date     ANKLE SURGERY Bilateral      ORTHOPEDIC SURGERY       PE TUBES       Current Outpatient Medications   Medication Sig Dispense Refill     FLUoxetine (PROZAC) 40 MG capsule          Allergies   Allergen Reactions     Sulfa Drugs Unknown     Bupropion Rash     Annotation: rash and joint pain          Social History     Tobacco Use     Smoking status: Never Smoker     Smokeless tobacco: Never Used   Substance Use Topics     Alcohol use: Yes     Alcohol/week: 0.0 standard drinks     Family History   Problem Relation Age of Onset     Skin Cancer Mother         BCC     Mental Illness Mother      Thyroid Disease Mother      Stomach Problem Mother      Hypertension Mother      Substance Abuse Father      No Known Problems Sister      No Known Problems Brother      Heart Disease Maternal Grandmother      No Known Problems Maternal Grandfather      No Known Problems Paternal Grandmother      No Known Problems Other      Melanoma No family hx of      History   Drug Use No         Objective     /79   Pulse 84   Temp 98.7  F (37.1  C) (Oral)   Wt 68.9 kg (152 lb)   SpO2 95%   BMI 26.09 kg/m      Physical Exam     GENERAL APPEARANCE: healthy, alert and no distress     EYES: EOMI, PERRL     HENT: ear canals and TM's normal and nose and mouth without ulcers or lesions     NECK: no adenopathy, no asymmetry, masses, or scars and thyroid normal to palpation     RESP: lungs clear to auscultation - no rales, rhonchi or wheezes     CV: regular rates and rhythm, normal S1 S2, no S3 or S4 and no murmur, click or rub     ABDOMEN:  soft, nontender, no  HSM or masses and bowel sounds normal     MS: extremities normal- no gross deformities noted, no evidence of inflammation in joints, FROM in all extremities.     SKIN: no suspicious lesions or rashes     NEURO: Normal strength and tone, sensory exam grossly normal, mentation intact and speech normal     PSYCH: mentation appears normal. and affect normal/bright     LYMPHATICS: No cervical adenopathy    Recent Labs   Lab Test 04/02/21  1104   HGB 12.8           Diagnostics:  Labs are pending.   No EKG required, no history of coronary heart disease, significant arrhythmia, peripheral arterial disease or other structural heart disease.    Revised Cardiac Risk Index (RCRI):  The patient has the following serious cardiovascular risks for perioperative complications:   - No serious cardiac risks = 0 points     RCRI Interpretation: 0 points: Class I (very low risk - 0.4% complication rate)         Assessment & Plan     The proposed surgical procedure is considered INTERMEDIATE risk.    Conductive hearing 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      Pre-op exam    - Potassium; Future  - Hemoglobin; Future    Mood changes    - TSH; Future      Risks and Recommendations:  The patient has the following additional risks and recommendations for perioperative complications:   - No identified additional risk factors other than previously addressed    Medication Instructions:  Patient is to take all scheduled medications on the day of surgery    RECOMMENDATION:  APPROVAL GIVEN to proceed with proposed procedure, without further diagnostic evaluation.      Signed Electronically by: GT Yip CNP    Copy of this evaluation report is provided to requesting physician.

## 2021-08-10 NOTE — LETTER
8/10/2021      RE: Shauna Galdamez  [address]  [place] [address]       [facility] [address]  [address]LO[address]  [facility] [address]  Phone: [phone]  Fax: [phone]  Primary Provider: Yarelis Ref-Primary, Physician  Pre-op Performing Provider: SYED CISNEROS    PREOPERATIVE EVALUATION:  Today's date: 8/10/2021    Shauna Galdamez is a 45 year old female who presents for a preoperative evaluation.    Surgical Information:  Surgery/Procedure: TYMPANOPLASTY WITH CARTILAGE BACKING LEFT  [place] [facility] [place]  Surgeon: Brigida Mello MD  Surgery Date: 8/11/2021  Time of Surgery: 7:15 AM  Where patient plans to recover: At home with family  Fax number for surgical facility: Note does not need to be faxed, will be available electronically in Epic.    Type of Anesthesia Anticipated: General    Subjective     HPI related to upcoming procedure:   Shauna Galdamez is seen in consultation from Dr. Nissen. She is a 45 year old female being seen for eustachian tube dysfunction, left-sided hearing loss, and bilateral perforations. The patient has had multiple tube placements, and her last t-tube placement in 2018. Her left t-tube was removed in the summer as it was plugged and her right t-tube was noted to be extruded in November leaving her with bilateral perforations. She has a left conductive hearing loss and is interested in improving the loss. She otherwise has had no otorrhea, otalgia or changes in hearing on either ear.   Assessment and plan:  We discussed the left ear conductive hearing loss. I discussed her recent audiogram results and recent CT with her. She is a candidate for a left cartilage tympanoplasty given her conductive hearing loss but with the understanding that, after the repair is healed, she will need another t-tube placed due to her eustachian tube dysfunction. The risks and benefits were discussed.  The risks  include but are not limited to:  Worsened hearing which may require further surgery, profound and irreversible hearing loss, dizziness, damage to the taste nerve, damage to the facial nerve, tympanic membrane perforation requiring further surgery and infection.  Postoperative restrictions were discussed. She expressed understanding and knows that she needs external aeration for her ears and would like to proceed.  Preop Questions 8/10/2021   1. Have you ever had a heart attack or stroke? No   2. Have you ever had surgery on your heart or blood vessels, such as a stent placement, a coronary artery bypass, or surgery on an artery in your head, neck, heart, or legs? No   3. Do you have chest pain with activity? No   4. Do you have a history of  heart failure? No   5. Do you currently have a cold, bronchitis or symptoms of other infection? No   6. Do you have a cough, shortness of breath, or wheezing? No   7. Do you or anyone in your family have previous history of blood clots? No   8. Do you or does anyone in your family have a serious bleeding problem such as prolonged bleeding following surgeries or cuts? No   9. Have you ever had problems with anemia or been told to take iron pills? No   10. Have you had any abnormal blood loss such as black, tarry or bloody stools, or abnormal vaginal bleeding? No   11. Have you ever had a blood transfusion? No   12. Are you willing to have a blood transfusion if it is medically needed before, during, or after your surgery? Yes   13. Have you or any of your relatives ever had problems with anesthesia? No   14. Do you have sleep apnea, excessive snoring or daytime drowsiness? No   15. Do you have any artifical heart valves or other implanted medical devices like a pacemaker, defibrillator, or continuous glucose monitor? No   16. Do you have artificial joints? No   17. Are you allergic to latex? No   18. Is there any chance that you may be pregnant? No       Health Care  Directive:  Patient does not have a Health Care Directive or Living Will: Patient states has Advance Directive and will bring in a copy to clinic.    Preoperative Review of :   reviewed - no record of controlled substances prescribed.      Status of Chronic Conditions:  See problem list for active medical problems.  Problems all longstanding and stable, except as noted/documented.  See ROS for pertinent symptoms related to these conditions.      Review of Systems  CONSTITUTIONAL: NEGATIVE for fever, chills, change in weight  INTEGUMENTARY/SKIN: NEGATIVE for worrisome rashes, moles or lesions  EYES: NEGATIVE for vision changes or irritation  ENT/MOUTH: NEGATIVE for ear, mouth and throat problems  RESP: NEGATIVE for significant cough or SOB  CV: NEGATIVE for chest pain, palpitations or peripheral edema  GI: NEGATIVE for nausea, abdominal pain, heartburn, or change in bowel habits  : NEGATIVE for frequency, dysuria, or hematuria  MUSCULOSKELETAL: NEGATIVE for significant arthralgias or myalgia  NEURO: NEGATIVE for weakness, dizziness or paresthesias  ENDOCRINE: NEGATIVE for temperature intolerance, skin/hair changes  HEME: NEGATIVE for bleeding problems  PSYCHIATRIC: NEGATIVE for changes in mood or affect    Patient Active Problem List    Diagnosis Date Noted     Perforation of tympanic membrane, bilateral 07/09/2021     Priority: Medium     Added automatically from request for surgery [id number]       Mixed conductive and sensorineural hearing 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 07/09/2021     Priority: Medium     Added automatically from request for surgery [id number]       Episode of recurrent major depressive disorder, unspecified depression episode severity (H) 04/02/2021     Priority: Medium     Family history of melanoma 02/06/2017     Priority: Medium     Multiple benign nevi 02/06/2017     Priority: Medium     Cherry angioma 02/06/2017     Priority: Medium     Solar lentiginosis 02/06/2017      Priority: Medium     Dermal and epidermal nevus 02/06/2017     Priority: Medium     Pilar cyst 02/06/2017     Priority: Medium     Keloid scar 02/06/2017     Priority: Medium     Closed fracture of tibia 01/13/2016     Priority: Medium     Anxiety 04/11/2011     Priority: Medium     PMS (premenstrual syndrome) 11/17/2010     Priority: Medium      Past Medical History:   Diagnosis Date     Anxiety      Depression      Past Surgical History:   Procedure Laterality Date     ANKLE SURGERY Bilateral      ORTHOPEDIC SURGERY       PE TUBES       Current Outpatient Medications   Medication Sig Dispense Refill     FLUoxetine (PROZAC) 40 MG capsule          Allergies   Allergen Reactions     Sulfa Drugs Unknown     Bupropion Rash     Annotation: rash and joint pain          Social History     Tobacco Use     Smoking status: Never Smoker     Smokeless tobacco: Never Used   Substance Use Topics     Alcohol use: Yes     Alcohol/week: 0.0 standard drinks     Family History   Problem Relation Age of Onset     Skin Cancer Mother         BCC     Mental Illness Mother      Thyroid Disease Mother      Stomach Problem Mother      Hypertension Mother      Substance Abuse Father      No Known Problems Sister      No Known Problems Brother      Heart Disease Maternal Grandmother      No Known Problems Maternal Grandfather      No Known Problems Paternal Grandmother      No Known Problems Other      Melanoma No family hx of      History   Drug Use No         Objective     /79   Pulse 84   Temp 98.7  F (37.1  C) (Oral)   Wt 68.9 kg (152 lb)   SpO2 95%   BMI 26.09 kg/m      Physical Exam     GENERAL APPEARANCE: healthy, alert and no distress     EYES: EOMI, PERRL     HENT: ear canals and TM's normal and nose and mouth without ulcers or lesions     NECK: no adenopathy, no asymmetry, masses, or scars and thyroid normal to palpation     RESP: lungs clear to auscultation - no rales, rhonchi or wheezes     CV: regular rates and  rhythm, normal S1 S2, no S3 or S4 and no murmur, click or rub     ABDOMEN:  soft, nontender, no HSM or masses and bowel sounds normal     MS: extremities normal- no gross deformities noted, no evidence of inflammation in joints, FROM in all extremities.     SKIN: no suspicious lesions or rashes     NEURO: Normal strength and tone, sensory exam grossly normal, mentation intact and speech normal     PSYCH: mentation appears normal. and affect normal/bright     LYMPHATICS: No cervical adenopathy    Recent Labs   Lab Test 04/02/21  1104   HGB 12.8           Diagnostics:  Labs are pending.   No EKG required, no history of coronary heart disease, significant arrhythmia, peripheral arterial disease or other structural heart disease.    Revised Cardiac Risk Index (RCRI):  The patient has the following serious cardiovascular risks for perioperative complications:   - No serious cardiac risks = 0 points     RCRI Interpretation: 0 points: Class I (very low risk - 0.4% complication rate)         Assessment & Plan     The proposed surgical procedure is considered INTERMEDIATE risk.    Conductive hearing 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      Pre-op exam    - Potassium; Future  - Hemoglobin; Future    Mood changes    - TSH; Future      Risks and Recommendations:  The patient has the following additional risks and recommendations for perioperative complications:   - No identified additional risk factors other than previously addressed    Medication Instructions:  Patient is to take all scheduled medications on the day of surgery    RECOMMENDATION:  APPROVAL GIVEN to proceed with proposed procedure, without further diagnostic evaluation.      Signed Electronically by: GT Yip CNP    Copy of this evaluation report is provided to requesting physician.    {Provider Resources  Preop [facility] Preop Guidelines  Revised Cardiac Risk Index :446330}      hSabnam Aaron NP

## 2021-08-10 NOTE — H&P (VIEW-ONLY)
[facility] [address] [address]  Phone: [phone]  Fax: [phone]  Primary Provider: No Ref-Primary, Physician  Pre-op Performing Provider: SYED CISNEROS    PREOPERATIVE EVALUATION:  Today's date: 8/10/2021    Shauna Galdamez is a 45 year old female who presents for a preoperative evaluation.    Surgical Information:  Surgery/Procedure: TYMPANOPLASTY WITH CARTILAGE BACKING LEFT  Sur[place] Location[place] [facility] [place]  Surgeon: Brigida Mello MD  Surgery Date: 8/11/2021  Time of Surgery: 7:15 AM  Where patient plans to recover: At home with family  Fax number for surgical facility: Note does not need to be faxed, will be available electronically in Epic.    Type of Anesthesia Anticipated: General    Subjective     HPI related to upcoming procedure:   Shauna Galdamez is seen in consultation from Dr. Nissen. She is a 45 year old female being seen for eustachian tube dysfunction, left-sided hearing loss, and bilateral perforations. The patient has had multiple tube placements, and her last t-tube placement in 2018. Her left t-tube was removed in the summer as it was plugged and her right t-tube was noted to be extruded in November leaving her with bilateral perforations. She has a left conductive hearing loss and is interested in improving the loss. She otherwise has had no otorrhea, otalgia or changes in hearing on either ear.   Assessment and plan:  We discussed the left ear conductive hearing loss. I discussed her recent audiogram results and recent CT with her. She is a candidate for a left cartilage tympanoplasty given her conductive hearing loss but with the understanding that, after the repair is healed, she will need another t-tube placed due to her eustachian tube dysfunction. The risks and benefits were discussed.  The risks include but are not limited to:  Worsened hearing which may require further surgery,  profound and irreversible hearing loss, dizziness, damage to the taste nerve, damage to the facial nerve, tympanic membrane perforation requiring further surgery and infection.  Postoperative restrictions were discussed. She expressed understanding and knows that she needs external aeration for her ears and would like to proceed.  Preop Questions 8/10/2021   1. Have you ever had a heart attack or stroke? No   2. Have you ever had surgery on your heart or blood vessels, such as a stent placement, a coronary artery bypass, or surgery on an artery in your head, neck, heart, or legs? No   3. Do you have chest pain with activity? No   4. Do you have a history of  heart failure? No   5. Do you currently have a cold, bronchitis or symptoms of other infection? No   6. Do you have a cough, shortness of breath, or wheezing? No   7. Do you or anyone in your family have previous history of blood clots? No   8. Do you or does anyone in your family have a serious bleeding problem such as prolonged bleeding following surgeries or cuts? No   9. Have you ever had problems with anemia or been told to take iron pills? No   10. Have you had any abnormal blood loss such as black, tarry or bloody stools, or abnormal vaginal bleeding? No   11. Have you ever had a blood transfusion? No   12. Are you willing to have a blood transfusion if it is medically needed before, during, or after your surgery? Yes   13. Have you or any of your relatives ever had problems with anesthesia? No   14. Do you have sleep apnea, excessive snoring or daytime drowsiness? No   15. Do you have any artifical heart valves or other implanted medical devices like a pacemaker, defibrillator, or continuous glucose monitor? No   16. Do you have artificial joints? No   17. Are you allergic to latex? No   18. Is there any chance that you may be pregnant? No       Health Care Directive:  Patient does not have a Health Care Directive or Living Will: Patient states has  Advance Directive and will bring in a copy to clinic.    Preoperative Review of :   reviewed - no record of controlled substances prescribed.      Status of Chronic Conditions:  See problem list for active medical problems.  Problems all longstanding and stable, except as noted/documented.  See ROS for pertinent symptoms related to these conditions.      Review of Systems  CONSTITUTIONAL: NEGATIVE for fever, chills, change in weight  INTEGUMENTARY/SKIN: NEGATIVE for worrisome rashes, moles or lesions  EYES: NEGATIVE for vision changes or irritation  ENT/MOUTH: NEGATIVE for ear, mouth and throat problems  RESP: NEGATIVE for significant cough or SOB  CV: NEGATIVE for chest pain, palpitations or peripheral edema  GI: NEGATIVE for nausea, abdominal pain, heartburn, or change in bowel habits  : NEGATIVE for frequency, dysuria, or hematuria  MUSCULOSKELETAL: NEGATIVE for significant arthralgias or myalgia  NEURO: NEGATIVE for weakness, dizziness or paresthesias  ENDOCRINE: NEGATIVE for temperature intolerance, skin/hair changes  HEME: NEGATIVE for bleeding problems  PSYCHIATRIC: NEGATIVE for changes in mood or affect    Patient Active Problem List    Diagnosis Date Noted     Perforation of tympanic membrane, bilateral 07/09/2021     Priority: Medium     Added automatically from request for surgery [id number]       Mixed conductive and sensorineural hearing 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 07/09/2021     Priority: Medium     Added automatically from request for surgery [id number]       Episode of recurrent major depressive disorder, unspecified depression episode severity (H) 04/02/2021     Priority: Medium     Family history of melanoma 02/06/2017     Priority: Medium     Multiple benign nevi 02/06/2017     Priority: Medium     Cherry angioma 02/06/2017     Priority: Medium     Solar lentiginosis 02/06/2017     Priority: Medium     Dermal and epidermal nevus 02/06/2017     Priority: Medium     Pilar  cyst 02/06/2017     Priority: Medium     Keloid scar 02/06/2017     Priority: Medium     Closed fracture of tibia 01/13/2016     Priority: Medium     Anxiety 04/11/2011     Priority: Medium     PMS (premenstrual syndrome) 11/17/2010     Priority: Medium      Past Medical History:   Diagnosis Date     Anxiety      Depression      Past Surgical History:   Procedure Laterality Date     ANKLE SURGERY Bilateral      ORTHOPEDIC SURGERY       PE TUBES       Current Outpatient Medications   Medication Sig Dispense Refill     FLUoxetine (PROZAC) 40 MG capsule          Allergies   Allergen Reactions     Sulfa Drugs Unknown     Bupropion Rash     Annotation: rash and joint pain          Social History     Tobacco Use     Smoking status: Never Smoker     Smokeless tobacco: Never Used   Substance Use Topics     Alcohol use: Yes     Alcohol/week: 0.0 standard drinks     Family History   Problem Relation Age of Onset     Skin Cancer Mother         BCC     Mental Illness Mother      Thyroid Disease Mother      Stomach Problem Mother      Hypertension Mother      Substance Abuse Father      No Known Problems Sister      No Known Problems Brother      Heart Disease Maternal Grandmother      No Known Problems Maternal Grandfather      No Known Problems Paternal Grandmother      No Known Problems Other      Melanoma No family hx of      History   Drug Use No         Objective     /79   Pulse 84   Temp 98.7  F (37.1  C) (Oral)   Wt 68.9 kg (152 lb)   SpO2 95%   BMI 26.09 kg/m      Physical Exam     GENERAL APPEARANCE: healthy, alert and no distress     EYES: EOMI, PERRL     HENT: ear canals and TM's normal and nose and mouth without ulcers or lesions     NECK: no adenopathy, no asymmetry, masses, or scars and thyroid normal to palpation     RESP: lungs clear to auscultation - no rales, rhonchi or wheezes     CV: regular rates and rhythm, normal S1 S2, no S3 or S4 and no murmur, click or rub     ABDOMEN:  soft, nontender, no  HSM or masses and bowel sounds normal     MS: extremities normal- no gross deformities noted, no evidence of inflammation in joints, FROM in all extremities.     SKIN: no suspicious lesions or rashes     NEURO: Normal strength and tone, sensory exam grossly normal, mentation intact and speech normal     PSYCH: mentation appears normal. and affect normal/bright     LYMPHATICS: No cervical adenopathy    Recent Labs   Lab Test 04/02/21  1104   HGB 12.8           Diagnostics:  Labs are pending.   No EKG required, no history of coronary heart disease, significant arrhythmia, peripheral arterial disease or other structural heart disease.    Revised Cardiac Risk Index (RCRI):  The patient has the following serious cardiovascular risks for perioperative complications:   - No serious cardiac risks = 0 points     RCRI Interpretation: 0 points: Class I (very low risk - 0.4% complication rate)         Assessment & Plan     The proposed surgical procedure is considered INTERMEDIATE risk.    Conductive hearing 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      Pre-op exam    - Potassium; Future  - Hemoglobin; Future    Mood changes    - TSH; Future      Risks and Recommendations:  The patient has the following additional risks and recommendations for perioperative complications:   - No identified additional risk factors other than previously addressed    Medication Instructions:  Patient is to take all scheduled medications on the day of surgery    RECOMMENDATION:  APPROVAL GIVEN to proceed with proposed procedure, without further diagnostic evaluation.      Signed Electronically by: GT Yip CNP    Copy of this evaluation report is provided to requesting physician.

## 2021-08-11 ENCOUNTER — ANESTHESIA (OUTPATIENT)
Dept: SURGERY | Facility: AMBULATORY SURGERY CENTER | Age: 46
End: 2021-08-11
Payer: COMMERCIAL

## 2021-08-11 ENCOUNTER — HOSPITAL ENCOUNTER (OUTPATIENT)
Facility: AMBULATORY SURGERY CENTER | Age: 46
End: 2021-08-11
Attending: OTOLARYNGOLOGY
Payer: COMMERCIAL

## 2021-08-11 VITALS
SYSTOLIC BLOOD PRESSURE: 132 MMHG | HEART RATE: 88 BPM | HEIGHT: 64 IN | WEIGHT: 152 LBS | TEMPERATURE: 97.1 F | BODY MASS INDEX: 25.95 KG/M2 | RESPIRATION RATE: 18 BRPM | OXYGEN SATURATION: 98 % | DIASTOLIC BLOOD PRESSURE: 94 MMHG

## 2021-08-11 DIAGNOSIS — H72.93 PERFORATION OF TYMPANIC MEMBRANE, BILATERAL: ICD-10-CM

## 2021-08-11 DIAGNOSIS — H90.A32 MIXED CONDUCTIVE AND SENSORINEURAL HEARING LOSS OF LEFT EAR WITH RESTRICTED HEARING OF RIGHT EAR: ICD-10-CM

## 2021-08-11 DIAGNOSIS — G89.18 POSTOPERATIVE PAIN: Primary | ICD-10-CM

## 2021-08-11 LAB
HCG UR QL: NEGATIVE
INTERNAL QC OK POCT: NORMAL

## 2021-08-11 PROCEDURE — 69631 REPAIR EARDRUM STRUCTURES: CPT | Mod: LT | Performed by: OTOLARYNGOLOGY

## 2021-08-11 PROCEDURE — 21235 EAR CARTILAGE GRAFT: CPT | Mod: 59

## 2021-08-11 PROCEDURE — 21235 EAR CARTILAGE GRAFT: CPT | Mod: 59 | Performed by: OTOLARYNGOLOGY

## 2021-08-11 PROCEDURE — 69631 REPAIR EARDRUM STRUCTURES: CPT | Mod: LT

## 2021-08-11 PROCEDURE — 81025 URINE PREGNANCY TEST: CPT | Performed by: PATHOLOGY

## 2021-08-11 DEVICE — IMPLANTABLE DEVICE: Type: IMPLANTABLE DEVICE | Site: EAR | Status: FUNCTIONAL

## 2021-08-11 RX ORDER — FENTANYL CITRATE 50 UG/ML
INJECTION, SOLUTION INTRAMUSCULAR; INTRAVENOUS PRN
Status: DISCONTINUED | OUTPATIENT
Start: 2021-08-11 | End: 2021-08-11

## 2021-08-11 RX ORDER — HYDRALAZINE HYDROCHLORIDE 20 MG/ML
2.5-5 INJECTION INTRAMUSCULAR; INTRAVENOUS EVERY 10 MIN PRN
Status: DISCONTINUED | OUTPATIENT
Start: 2021-08-11 | End: 2021-08-11 | Stop reason: HOSPADM

## 2021-08-11 RX ORDER — ONDANSETRON 2 MG/ML
4 INJECTION INTRAMUSCULAR; INTRAVENOUS EVERY 30 MIN PRN
Status: DISCONTINUED | OUTPATIENT
Start: 2021-08-11 | End: 2021-08-12 | Stop reason: HOSPADM

## 2021-08-11 RX ORDER — OXYCODONE HYDROCHLORIDE 5 MG/1
5 TABLET ORAL EVERY 4 HOURS PRN
Status: DISCONTINUED | OUTPATIENT
Start: 2021-08-11 | End: 2021-08-12 | Stop reason: HOSPADM

## 2021-08-11 RX ORDER — LIDOCAINE 40 MG/G
CREAM TOPICAL
Status: DISCONTINUED | OUTPATIENT
Start: 2021-08-11 | End: 2021-08-11 | Stop reason: HOSPADM

## 2021-08-11 RX ORDER — ACETAMINOPHEN 325 MG/1
975 TABLET ORAL ONCE
Status: COMPLETED | OUTPATIENT
Start: 2021-08-11 | End: 2021-08-11

## 2021-08-11 RX ORDER — SODIUM CHLORIDE, SODIUM LACTATE, POTASSIUM CHLORIDE, CALCIUM CHLORIDE 600; 310; 30; 20 MG/100ML; MG/100ML; MG/100ML; MG/100ML
INJECTION, SOLUTION INTRAVENOUS CONTINUOUS
Status: DISCONTINUED | OUTPATIENT
Start: 2021-08-11 | End: 2021-08-11 | Stop reason: HOSPADM

## 2021-08-11 RX ORDER — DEXAMETHASONE SODIUM PHOSPHATE 4 MG/ML
INJECTION, SOLUTION INTRA-ARTICULAR; INTRALESIONAL; INTRAMUSCULAR; INTRAVENOUS; SOFT TISSUE PRN
Status: DISCONTINUED | OUTPATIENT
Start: 2021-08-11 | End: 2021-08-11

## 2021-08-11 RX ORDER — ONDANSETRON 4 MG/1
4 TABLET, ORALLY DISINTEGRATING ORAL EVERY 30 MIN PRN
Status: DISCONTINUED | OUTPATIENT
Start: 2021-08-11 | End: 2021-08-12 | Stop reason: HOSPADM

## 2021-08-11 RX ORDER — ACETAMINOPHEN 325 MG/1
650 TABLET ORAL
Status: DISCONTINUED | OUTPATIENT
Start: 2021-08-11 | End: 2021-08-12 | Stop reason: HOSPADM

## 2021-08-11 RX ORDER — FENTANYL CITRATE 50 UG/ML
25 INJECTION, SOLUTION INTRAMUSCULAR; INTRAVENOUS EVERY 5 MIN PRN
Status: DISCONTINUED | OUTPATIENT
Start: 2021-08-11 | End: 2021-08-11 | Stop reason: HOSPADM

## 2021-08-11 RX ORDER — PROPOFOL 10 MG/ML
INJECTION, EMULSION INTRAVENOUS CONTINUOUS PRN
Status: DISCONTINUED | OUTPATIENT
Start: 2021-08-11 | End: 2021-08-11

## 2021-08-11 RX ORDER — SODIUM CHLORIDE, SODIUM LACTATE, POTASSIUM CHLORIDE, CALCIUM CHLORIDE 600; 310; 30; 20 MG/100ML; MG/100ML; MG/100ML; MG/100ML
INJECTION, SOLUTION INTRAVENOUS CONTINUOUS
Status: DISCONTINUED | OUTPATIENT
Start: 2021-08-11 | End: 2021-08-12 | Stop reason: HOSPADM

## 2021-08-11 RX ORDER — OXYCODONE HYDROCHLORIDE 5 MG/1
5-10 TABLET ORAL EVERY 4 HOURS PRN
Status: DISCONTINUED | OUTPATIENT
Start: 2021-08-11 | End: 2021-08-12 | Stop reason: HOSPADM

## 2021-08-11 RX ORDER — PROPOFOL 10 MG/ML
INJECTION, EMULSION INTRAVENOUS PRN
Status: DISCONTINUED | OUTPATIENT
Start: 2021-08-11 | End: 2021-08-11

## 2021-08-11 RX ORDER — EPHEDRINE SULFATE 50 MG/ML
INJECTION, SOLUTION INTRAMUSCULAR; INTRAVENOUS; SUBCUTANEOUS PRN
Status: DISCONTINUED | OUTPATIENT
Start: 2021-08-11 | End: 2021-08-11

## 2021-08-11 RX ORDER — OFLOXACIN 3 MG/ML
5 SOLUTION AURICULAR (OTIC) 2 TIMES DAILY
Qty: 10 ML | Refills: 0 | Status: SHIPPED | OUTPATIENT
Start: 2021-08-18 | End: 2021-09-08

## 2021-08-11 RX ORDER — ONDANSETRON 2 MG/ML
INJECTION INTRAMUSCULAR; INTRAVENOUS PRN
Status: DISCONTINUED | OUTPATIENT
Start: 2021-08-11 | End: 2021-08-11

## 2021-08-11 RX ORDER — MEPERIDINE HYDROCHLORIDE 25 MG/ML
12.5 INJECTION INTRAMUSCULAR; INTRAVENOUS; SUBCUTANEOUS
Status: DISCONTINUED | OUTPATIENT
Start: 2021-08-11 | End: 2021-08-12 | Stop reason: HOSPADM

## 2021-08-11 RX ORDER — ACETAMINOPHEN 325 MG/1
975 TABLET ORAL ONCE
Status: DISCONTINUED | OUTPATIENT
Start: 2021-08-11 | End: 2021-08-11 | Stop reason: HOSPADM

## 2021-08-11 RX ORDER — ACETAMINOPHEN 325 MG/1
975 TABLET ORAL ONCE
Status: DISCONTINUED | OUTPATIENT
Start: 2021-08-11 | End: 2021-08-12 | Stop reason: HOSPADM

## 2021-08-11 RX ORDER — DEXAMETHASONE SODIUM PHOSPHATE 10 MG/ML
10 INJECTION, SOLUTION INTRAMUSCULAR; INTRAVENOUS ONCE
Status: DISCONTINUED | OUTPATIENT
Start: 2021-08-11 | End: 2021-08-11 | Stop reason: HOSPADM

## 2021-08-11 RX ORDER — LIDOCAINE HYDROCHLORIDE 20 MG/ML
INJECTION, SOLUTION INFILTRATION; PERINEURAL PRN
Status: DISCONTINUED | OUTPATIENT
Start: 2021-08-11 | End: 2021-08-11

## 2021-08-11 RX ORDER — HYDROCODONE BITARTRATE AND ACETAMINOPHEN 5; 325 MG/1; MG/1
1 TABLET ORAL
Status: DISCONTINUED | OUTPATIENT
Start: 2021-08-11 | End: 2021-08-12 | Stop reason: HOSPADM

## 2021-08-11 RX ORDER — CEFAZOLIN SODIUM 2 G/50ML
2 SOLUTION INTRAVENOUS
Status: COMPLETED | OUTPATIENT
Start: 2021-08-11 | End: 2021-08-11

## 2021-08-11 RX ORDER — HYDROMORPHONE HYDROCHLORIDE 1 MG/ML
0.2 INJECTION, SOLUTION INTRAMUSCULAR; INTRAVENOUS; SUBCUTANEOUS EVERY 5 MIN PRN
Status: DISCONTINUED | OUTPATIENT
Start: 2021-08-11 | End: 2021-08-11 | Stop reason: HOSPADM

## 2021-08-11 RX ORDER — ALBUTEROL SULFATE 0.83 MG/ML
2.5 SOLUTION RESPIRATORY (INHALATION) EVERY 4 HOURS PRN
Status: DISCONTINUED | OUTPATIENT
Start: 2021-08-11 | End: 2021-08-11 | Stop reason: HOSPADM

## 2021-08-11 RX ORDER — LABETALOL HYDROCHLORIDE 5 MG/ML
10 INJECTION, SOLUTION INTRAVENOUS
Status: DISCONTINUED | OUTPATIENT
Start: 2021-08-11 | End: 2021-08-11 | Stop reason: HOSPADM

## 2021-08-11 RX ORDER — HYDROCODONE BITARTRATE AND ACETAMINOPHEN 5; 325 MG/1; MG/1
1 TABLET ORAL EVERY 6 HOURS PRN
Qty: 8 TABLET | Refills: 0 | Status: SHIPPED | OUTPATIENT
Start: 2021-08-11 | End: 2021-09-02

## 2021-08-11 RX ORDER — CEFAZOLIN SODIUM 2 G/50ML
2 SOLUTION INTRAVENOUS SEE ADMIN INSTRUCTIONS
Status: DISCONTINUED | OUTPATIENT
Start: 2021-08-11 | End: 2021-08-11 | Stop reason: HOSPADM

## 2021-08-11 RX ORDER — LORAZEPAM 2 MG/ML
.5-1 INJECTION INTRAMUSCULAR
Status: DISCONTINUED | OUTPATIENT
Start: 2021-08-11 | End: 2021-08-11 | Stop reason: HOSPADM

## 2021-08-11 RX ORDER — KETOROLAC TROMETHAMINE 30 MG/ML
15 INJECTION, SOLUTION INTRAMUSCULAR; INTRAVENOUS EVERY 6 HOURS PRN
Status: DISCONTINUED | OUTPATIENT
Start: 2021-08-11 | End: 2021-08-12 | Stop reason: HOSPADM

## 2021-08-11 RX ORDER — GABAPENTIN 300 MG/1
300 CAPSULE ORAL
Status: COMPLETED | OUTPATIENT
Start: 2021-08-11 | End: 2021-08-11

## 2021-08-11 RX ORDER — BACITRACIN 500 [USP'U]/G
OINTMENT OPHTHALMIC PRN
Status: DISCONTINUED | OUTPATIENT
Start: 2021-08-11 | End: 2021-08-11 | Stop reason: HOSPADM

## 2021-08-11 RX ORDER — GLYCOPYRROLATE 0.2 MG/ML
INJECTION, SOLUTION INTRAMUSCULAR; INTRAVENOUS PRN
Status: DISCONTINUED | OUTPATIENT
Start: 2021-08-11 | End: 2021-08-11

## 2021-08-11 RX ADMIN — SODIUM CHLORIDE, SODIUM LACTATE, POTASSIUM CHLORIDE, CALCIUM CHLORIDE: 600; 310; 30; 20 INJECTION, SOLUTION INTRAVENOUS at 06:38

## 2021-08-11 RX ADMIN — ONDANSETRON 4 MG: 2 INJECTION INTRAMUSCULAR; INTRAVENOUS at 07:37

## 2021-08-11 RX ADMIN — PROPOFOL 200 MG: 10 INJECTION, EMULSION INTRAVENOUS at 07:26

## 2021-08-11 RX ADMIN — ACETAMINOPHEN 975 MG: 325 TABLET ORAL at 06:38

## 2021-08-11 RX ADMIN — OXYCODONE HYDROCHLORIDE 5 MG: 5 TABLET ORAL at 09:31

## 2021-08-11 RX ADMIN — SODIUM CHLORIDE, SODIUM LACTATE, POTASSIUM CHLORIDE, CALCIUM CHLORIDE: 600; 310; 30; 20 INJECTION, SOLUTION INTRAVENOUS at 07:26

## 2021-08-11 RX ADMIN — EPHEDRINE SULFATE 5 MG: 50 INJECTION, SOLUTION INTRAMUSCULAR; INTRAVENOUS; SUBCUTANEOUS at 08:57

## 2021-08-11 RX ADMIN — DEXAMETHASONE SODIUM PHOSPHATE 10 MG: 4 INJECTION, SOLUTION INTRA-ARTICULAR; INTRALESIONAL; INTRAMUSCULAR; INTRAVENOUS; SOFT TISSUE at 07:37

## 2021-08-11 RX ADMIN — FENTANYL CITRATE 50 MCG: 50 INJECTION, SOLUTION INTRAMUSCULAR; INTRAVENOUS at 07:37

## 2021-08-11 RX ADMIN — PROPOFOL 150 MCG/KG/MIN: 10 INJECTION, EMULSION INTRAVENOUS at 07:37

## 2021-08-11 RX ADMIN — GABAPENTIN 300 MG: 300 CAPSULE ORAL at 06:38

## 2021-08-11 RX ADMIN — Medication 0.5 MG: at 09:00

## 2021-08-11 RX ADMIN — PROPOFOL 150 MCG/KG/MIN: 10 INJECTION, EMULSION INTRAVENOUS at 08:23

## 2021-08-11 RX ADMIN — GLYCOPYRROLATE 0.2 MG: 0.2 INJECTION, SOLUTION INTRAMUSCULAR; INTRAVENOUS at 07:26

## 2021-08-11 RX ADMIN — CEFAZOLIN SODIUM 2 G: 2 SOLUTION INTRAVENOUS at 07:41

## 2021-08-11 RX ADMIN — LIDOCAINE HYDROCHLORIDE 80 MG: 20 INJECTION, SOLUTION INFILTRATION; PERINEURAL at 07:26

## 2021-08-11 ASSESSMENT — MIFFLIN-ST. JEOR: SCORE: 1309.47

## 2021-08-11 NOTE — ANESTHESIA CARE TRANSFER NOTE
Patient: Shauna Galdamez    Procedure(s):  TYMPANOPLASTY WITH CARTILAGE BACKING LEFT, T TUBE INSERTION    Diagnosis: Perforation of tympanic membrane, bilateral [H72.93]  Mixed conductive and sensorineural hearing 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 [H90.A32]  Diagnosis Additional Information: No value filed.    Anesthesia Type:   General     Note:    Oropharynx: oropharynx clear of all foreign objects  Level of Consciousness: awake  Oxygen Supplementation: room air    Independent Airway: airway patency satisfactory and stable  Dentition: dentition unchanged  Vital Signs Stable: post-procedure vital signs reviewed and stable  Report to RN Given: handoff report given  Patient transferred to: PACU    Handoff Report: Identifed the Patient, Identified the Reponsible Provider, Reviewed the pertinent medical history, Discussed the surgical course, Reviewed Intra-OP anesthesia mangement and issues during anesthesia, Set expectations for post-procedure period and Allowed opportunity for questions and acknowledgement of understanding      Vitals:  Vitals Value Taken Time   /71 08/11/21 0915   Temp 36.1  C (97  F) 08/11/21 0915   Pulse 83 08/11/21 0928   Resp 8 08/11/21 0928   SpO2 96 % 08/11/21 0928   Vitals shown include unvalidated device data.    Electronically Signed By: GT Flaherty CRNA  August 11, 2021  9:29 AM

## 2021-08-11 NOTE — OP NOTE
Date of service:  8/11/21    Preoperative diagnosis:  Tympanic membrane perforation    Postoperative diagnosis:  Tympanic membrane perforation    Procedure:  1.  Left tympanoplasty with cartilage backing  2.  Left myringotomy and t-tube placement  3.  Facial nerve monitoring x 1.5 hours    Surgeon:  Brigida Mello MD    Fellow:  Stacia Spicer MD    Resident:  Cyndi Hernández MD    Anesthesia:  General    EBL:  5cc    Specimens:  None    Complications:  None    Findings:  Clean middle ear, 40% inferior perforation.    Indications:  Shauna Galdamez is a patient with a left tympanic membrane perforation.  Discussion was had about tympanoplasty.  Risks and benefits had been discussed and consent had been obtained.    Procedure:  The patient was taken to the operating room and placed supine on the operating room table.  General anesthesia was induced and endotracheal intubation was performed.  Time Out was then performed with confirmation of the patient, site and procedure.  Facial nerve electrodes were placed on the left side of the face.  Impedances were checked and the nerve was monitored for the entirety of the case.  1:100,000 epinephrine was injected into the ear canal and tragus.  The area was prepped and draped in standard surgical fashion.  The operating microscope was brought into position and used for the entirety of the case.  The ear canal was inspected and irrigated with normal saline.  The tympanic membrane was inspected.  There is noted to be a 40% inferior perforation with clean edges and much of the remainder of the TM is myringosclerotic. A myringotomy incision was made superiorly between the myringosclerotic plaque and the annulus and a t-tube was placed. Canal incisions were made and the tympanomeatal flap elevated. The chorda tympani was preserved. The ossicular chain is noted to be intact and mobile. There was a small adhesion band between the TM and the end of the long process of the incus  which was released. Tragal incision was made and an approximately 1cm x 5mm piece of cartilage was harvested. The incision was closed. The cartilage was prepared on the back table for grafting resulting in a piece of cartilage with perichondrium and a piece of perichondrium. The cartilage was used to graft the inferior portion of the perforation and the perichondrium used to graft the superior portion of the perforation. Gelfoam was placed into the middle ear underneath the grafts. The grafts were noted to be in good position.  The tympanomeatal flap was placed back into position and gelfoam used to cover the graft and seal the incision.  Bacitracin was used to fill the ear canal and cover the lumen of the t-tube.  A cotton ball was placed over the ear canal and the facial nerve electrodes removed.  The patient tolerated the procedure well and counts were correct.  The patient was returned to Anesthesia, awakened, extubated and taken to the PACU in stable condition.    Brigida ORTIZ MD, was scrubbed during all portions of the procedure.

## 2021-08-11 NOTE — BRIEF OP NOTE
[facility]    Brief Operative Note    Pre-operative diagnosis: Perforation of tympanic membrane, bilateral [H72.93]  Mixed conductive and sensorineural hearing 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 [H90.A32]  Post-operative diagnosis Same as pre-operative diagnosis    Procedure: Procedure(s):  TYMPANOPLASTY WITH CARTILAGE BACKING LEFT, T TUBE INSERTION  Surgeon: Surgeon(s) and Role:     * Brigida Mello MD - Primary  Anesthesia: General   Estimated blood loss: Minimal  Drains: None  Specimens: * No specimens in log *  Findings:  40% inferior perforation, T-tube placed superiorly   Complications: None .  Implants:   Implant Name Type Inv. Item Serial No.  Lot No. LRB No. Used Action   Poornima T Tube Ventilation Tube    [employer] [id number] Left 1 Implanted

## 2021-08-11 NOTE — ANESTHESIA POSTPROCEDURE EVALUATION
Patient: Shauna Galdamez    Procedure(s):  TYMPANOPLASTY WITH CARTILAGE BACKING LEFT, T TUBE INSERTION    Diagnosis:Perforation of tympanic membrane, bilateral [H72.93]  Mixed conductive and sensorineural hearing 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 [H90.A32]  Diagnosis Additional Information: No value filed.    Anesthesia Type:  General    Note:  Disposition: Outpatient   Postop Pain Control: Uneventful            Sign Out: Well controlled pain   PONV:    Neuro/Psych: Uneventful            Sign Out: Acceptable/Baseline neuro status   Airway/Respiratory: Uneventful            Sign Out: Acceptable/Baseline resp. status   CV/Hemodynamics: Uneventful            Sign Out: Acceptable CV status; No obvious hypovolemia; No obvious fluid overload   Other NRE:    DID A NON-ROUTINE EVENT OCCUR?            Last vitals:  Vitals Value Taken Time   /71 08/11/21 0915   Temp 36.1  C (97  F) 08/11/21 0915   Pulse 85 08/11/21 0924   Resp 7 08/11/21 0924   SpO2 97 % 08/11/21 0924   Vitals shown include unvalidated device data.    Electronically Signed By: Jose Toledo MD  August 11, 2021  9:26 AM

## 2021-08-11 NOTE — DISCHARGE INSTRUCTIONS
"1. Resume your home medications. Take pain medications as indicated. Use docusate to avoid constipation. Start your ear drops in 1 week after surgery and use until your follow up.    2. Wound care  * Change the cotton ball in your ear as needed for drainage.  It's normal to expect some drainage from your ear for the first few days after surgery.    3. Use a cotton ball coated with vasoline to keep water out of ear canal.    4. For the next week, no heavy lifting more than 5 pounds, no strenuous activities. No nose blowing. Sneeze with your mouth open.    5. Please call MD or come to the ED for shortness of breath, trouble breathing, inability to tolerate liquids, intractable dizziness, or signs of infection such as fevers or redness.      Call the [phone] clinic number if you have any questions and concerns during the day and call [phone] at night and ask for \"ENT resident on call\".     6. Follow up with Dr. Mello as previously scheduled.    [facility]  Home Care Following Anesthesia  For 24 hours after surgery:  1. Get plenty of rest.  A responsible adult must stay with you for at least 24 hours after you leave the surgery center.  2. Do not drive or use heavy equipment.  If you have weakness or tingling, don't drive or use heavy equipment until this feeling goes away.   3. Do not drink alcohol.   4. Avoid strenuous or risky activities.  Ask for help when climbing stairs.  5. You may feel lightheaded.  IF so, sit for a few minutes before standing.  Have someone help you get up.   6. If you have nausea (feel sick to your stomach): Drink only clear liquids such as apple juice, ginger ale, broth or 7-Up.  Rest may also help.  Be sure to drink enough fluids.  Move to a regular diet as you feel able.   7. You may have a slight fever.  Call the doctor if your fever is over 100 F (37.7 C) (taken under the tongue) or lasts longer than 24 hours.  8. You may have a dry mouth, a " "sore throat, muscle aches or trouble sleeping. These should go away after 24 hours.  9. Do not make important or legal decisions.   10. It is recommended to avoid smoking.   If you use hormonal birth control (such as the pill, patch, ring or implants):  You will need a second form of birth control for 7 days (condoms, a diaphragm or contraceptive foam).  While in the surgery center, you received a medicine called Sugammadex.  Hormonal birth control (such as the pill, patch, ring or implants) will not work as well for a week after taking this medicine.  Today you received a Marcaine or bupivacaine block to numb the nerves near your surgery site.  This is a block using local anesthetic or \"numbing\" medication injected around the nerves to anesthetize or \"numb\" the area supplied by those nerves.  This block is injected into the muscle layer near your surgical site.  The medication may numb the location where you had surgery for 6-18 hours, but may last up to 24 hours.  If your surgical site is an arm or leg you should be careful with your affected limb, since it is possible to injure your limb without being aware of it due to the numbing.  Until full feeling returns, you should guard against bumping or hitting your limb, and avoid extreme hot or cold temperatures on the skin.  As the block wears off, the feeling will return as a tingling or prickly sensation near your surgical site.  You will experience more discomfort from your incision as the feeling returns.  You may want to take a pain pill (a narcotic or Tylenol if this was prescribed by your surgeon) when you start to experience mild pain before the pain beccomes more severe.  If your pain medications do not control your pain you should notifiy your surgeon.    Tips for taking pain medications  To get the best pain relief possible, remember these points:    Take pain medications as directed, before pain becomes severe.    Pain medication can upset your stomach: " taking it with food may help.    Constipation is a common side effect of pain medication. Drink plenty of  fluids.    Eat foods high in fiber. Take a stool softener if recommended by your doctor or pharmacist.    Do not drink alcohol, drive or operate machinery while taking pain medications.    Ask about other ways to control pain, such as with heat, ice or relaxation.    Tylenol/Acetaminophen Consumption: Tylenol 975 mg given at 630 am.  Next dose available at 1 pm.  To help encourage the safe use of acetaminophen, the makers of TYLENOL  have lowered the maximum daily dose for single-ingredient Extra Strength TYLENOL  (acetaminophen) products sold in the [place] from 8 pills per day (4,000 mg) to 6 pills per day (3,000 mg). The dosing interval has also changed from 2 pills every 4-6 hours to 2 pills every 6 hours.    If you feel your pain relief is insufficient, you may take Tylenol/Acetaminophen in addition to your narcotic pain medication.     Be careful not to exceed 3,000 mg of Tylenol/Acetaminophen in a 24 hour period from all sources.    If you are taking extra strength Tylenol/acetaminophen (500 mg), the maximum dose is 6 tablets in 24 hours.    If you are taking regular strength acetaminophen (325 mg), the maximum dose is 9 tablets in 24 hours.    Call a doctor for any of the followin. Signs of infection (fever, growing tenderness at the surgery site, a large amount of drainage or bleeding, severe pain, foul-smelling drainage, redness, swelling).  2. It has been over 8 to 10 hours since surgery and you are still not able to urinate (pass water).  3. Headache for over 24 hours.  4. Signs of Covid-19 infection (temperature over 100 degrees, shortness of breath, cough, loss of taste/smell, generalized body aches, persistent headache, chills, sore throat, nausea/vomiting/diarrhea)  Your doctor is:  Dr. Brigida Mello, ENT Otolaryngology: [phone]  Or dial [phone] and ask for the resident on call for:   ENT Otolaryngology  For emergency care, call the:  [facility]:  [phone] (TTY for hearing impaired: [phone])

## 2021-08-12 ENCOUNTER — TELEPHONE (OUTPATIENT)
Dept: OTOLARYNGOLOGY | Facility: CLINIC | Age: 46
End: 2021-08-12

## 2021-08-27 NOTE — PATIENT INSTRUCTIONS
1. You were seen in the ENT Clinic today by Dr. Mello.  If you have any questions or concerns after your appointment, please call   - Option 1: ENT Clinic: [phone]   - Option 2: Angela (Dr. Mello's Nurse): [phone]                   Freda(Dr. Mello's Nurse): [phone]    2.   Plan to return to clinic 1 month with an audiogram  Angela Domínguez LPN  [facility] - Otolaryngology

## 2021-09-01 NOTE — PROGRESS NOTES
Otolaryngology Clinic      Name: Shauna Galdamez  MRN: [id number]  Age: 46 year old  : 1975     Chief Complaint:   Follow up    History of Present Illness:   Shauna Galdamez is a 46 year old female who presents for follow up 3 weeks s/p left tympanoplasty with cartilage backing, left myringotomy and left t-tube placement. Since surgery, the patient has not reported any concerns or complaints. She was noted to have a small left vestibular schwannoma on an MRI obtained before surgery.     Today, she reports good recovery from surgery. She had some otalgia as expected but did not utilize any narcotic pain medicine. Ear feels plugged as expected but no drainage or vertigo. She has been working hard with an outside oral surgeon on her TMJ which has been significantly disrupting and impairing her hearing aid usage. She was referred to the TMJ clinic at [facility] and is looking forward to working with them.     Physical examination:  Female in no acute distress.  Alert and answering questions appropriately.  HB 1/6 bilaterally.  Left ear examined under the microscope. Gelfoam packing is suctioned out from canal. There is a small tuft of granulation tissue in along the floor of the medial canal at the incision. T-tube in place and patent. Cartilage graft in place and healing appropriately. Middle ear appears well aerated.       Assessment and Plan:  Healing appropriately but will have her do another 10 days of drops. Will see her back in 1 month for audiogram and repeat exam. In regards to her vestibular schwannoma we will continue the plan of repeat imaging and audiogram in 6m ().     Follow-up: 1 month with audiogram.     Clarisse Hernández MD   ENT PGY2    Scribe Preparation Attestation:  Jerri ORTIZ, a scribe, prepared the chart for today's encounter.      Brigida ORTIZ MD, saw this patient with the resident/fellow and agree with the resident/fellow s findings  and plan of care as documented in the resident s/fellow s note. I was present for the entire procedure.    Brigida Mello MD    The documentation recorded by the scribe accurately reflects the services I personally performed and the decisions made by me.

## 2021-09-02 ENCOUNTER — OFFICE VISIT (OUTPATIENT)
Dept: OTOLARYNGOLOGY | Facility: CLINIC | Age: 46
End: 2021-09-02
Payer: COMMERCIAL

## 2021-09-02 DIAGNOSIS — D33.3 VESTIBULAR SCHWANNOMA (H): ICD-10-CM

## 2021-09-02 DIAGNOSIS — H69.93 ETD (EUSTACHIAN TUBE DYSFUNCTION), BILATERAL: Primary | ICD-10-CM

## 2021-09-02 PROCEDURE — 99024 POSTOP FOLLOW-UP VISIT: CPT | Mod: GC | Performed by: OTOLARYNGOLOGY

## 2021-09-02 ASSESSMENT — PAIN SCALES - GENERAL: PAINLEVEL: NO PAIN (0)

## 2021-09-02 NOTE — LETTER
2021      RE: Shauna Galdamez  [address]  [place] [address]     Otolaryngology Clinic      Name: Shauna Galdamez  MRN: [id number]  Age: 46 year old  : 1975     Chief Complaint:   Follow up    History of Present Illness:   Shauna Galdamez is a 46 year old female who presents for follow up 3 weeks s/p left tympanoplasty with cartilage backing, left myringotomy and left t-tube placement. Since surgery, the patient has not reported any concerns or complaints. She was noted to have a small left vestibular schwannoma on an MRI obtained before surgery.     Today, she reports good recovery from surgery. She had some otalgia as expected but did not utilize any narcotic pain medicine. Ear feels plugged as expected but no drainage or vertigo. She has been working hard with an outside oral surgeon on her TMJ which has been significantly disrupting and impairing her hearing aid usage. She was referred to the TMJ clinic at [facility] and is looking forward to working with them.     Physical examination:  Female in no acute distress.  Alert and answering questions appropriately.  HB 1/6 bilaterally.  Left ear examined under the microscope. Gelfoam packing is suctioned out from canal. There is a small tuft of granulation tissue in along the floor of the medial canal at the incision. T-tube in place and patent. Cartilage graft in place and healing appropriately. Middle ear appears well aerated.       Assessment and Plan:  Healing appropriately but will have her do another 10 days of drops. Will see her back in 1 month for audiogram and repeat exam. In regards to her vestibular schwannoma we will continue the plan of repeat imaging and audiogram in 6m ().     Follow-up: 1 month with audiogram.     Clarisse Hernández MD   ENT PGY2    Scribe Preparation Attestation:  Jerri ORTIZ, a scribe, prepared the chart for today's encounter.      Brigida ORTIZ MD, saw  this patient with the resident/fellow and agree with the resident/fellow s findings and plan of care as documented in the resident s/fellow s note. I was present for the entire procedure.    Brigida Mello MD    The documentation recorded by the scribe accurately reflects the services I personally performed and the decisions made by me.      Brigida Mello MD

## 2021-09-02 NOTE — NURSING NOTE
Chief Complaint   Patient presents with     RECHECK     3 weeks post op      Richard Montes De Oca LPN

## 2021-09-02 NOTE — Clinical Note
2021       RE: Shauna Galdamez  [address]  [place] [address]     Dear Colleague,    Thank you for referring your patient, Shauna Galdamez, to the [facility] [place] at [facility]. Please see a copy of my visit note below.      Otolaryngology Clinic      Name: Shauna Galdamez  MRN: [id number]  Age: 46 year old  : 1975     Chief Complaint:   Follow up    History of Present Illness:   Shauna Galdamez is a 46 year old female who presents for follow up 3 weeks s/p left tympanoplasty with cartilage backing, left myringotomy and left t-tube placement. Since surgery, the patient has not reported any concerns or complaints. She was noted to have a small left vestibular schwannoma on an MRI obtained before surgery.     Today, she reports good recovery from surgery. She had some otalgia as expected but did not utilize any narcotic pain medicine. Ear feels plugged as expected but no drainage or vertigo. She has been working hard with an outside oral surgeon on her TMJ which has been significantly disrupting and impairing her hearing aid usage. She was referred to the TMJ clinic at [facility] and is looking forward to working with them.     Physical examination:  Female in no acute distress.  Alert and answering questions appropriately.  HB 1/6 bilaterally.  Left ears examined under the microscope. Gelfoam packing is suctioned out from canal. There is a small tuft of granulation tissue in along the floor of the medial canal at the incision. T-tube in place and patent. Cartilage graft in place and healing appropriately. Middle ear appears well aerated.      Assessment and Plan:  Healing appropriately but will have her do another 10 days of drops. Will see her back in 1 month for audiogram and repeat exam. In regards to her vestibular schwannoma we will continue the plan of repeat  imaging and audiogram in 6m (Februrary 2-022).     Follow-up: 1 month with audiogram.     Clarisse Hernández MD   ENT PGY2    Scribe Preparation Attestation:  I, Jerri Bland, a scribe, prepared the chart for today's encounter.          Again, thank you for allowing me to participate in the care of your patient.      Sincerely,    Brigida Mello MD

## 2021-09-25 ENCOUNTER — HEALTH MAINTENANCE LETTER (OUTPATIENT)
Age: 46
End: 2021-09-25

## 2021-10-06 DIAGNOSIS — H69.93 ETD (EUSTACHIAN TUBE DYSFUNCTION), BILATERAL: Primary | ICD-10-CM

## 2021-10-07 ENCOUNTER — OFFICE VISIT (OUTPATIENT)
Dept: OTOLARYNGOLOGY | Facility: CLINIC | Age: 46
End: 2021-10-07
Payer: COMMERCIAL

## 2021-10-07 ENCOUNTER — OFFICE VISIT (OUTPATIENT)
Dept: AUDIOLOGY | Facility: CLINIC | Age: 46
End: 2021-10-07
Payer: COMMERCIAL

## 2021-10-07 VITALS — WEIGHT: 152.56 LBS | HEIGHT: 64 IN | BODY MASS INDEX: 26.05 KG/M2

## 2021-10-07 DIAGNOSIS — H72.91 PERFORATION OF RIGHT TYMPANIC MEMBRANE: ICD-10-CM

## 2021-10-07 DIAGNOSIS — H90.72 MIXED CONDUCTIVE AND SENSORINEURAL HEARING LOSS OF LEFT EAR, UNSPECIFIED HEARING STATUS ON CONTRALATERAL SIDE: ICD-10-CM

## 2021-10-07 DIAGNOSIS — H90.72 MIXED CONDUCTIVE AND SENSORINEURAL HEARING LOSS OF LEFT EAR WITH UNRESTRICTED HEARING OF RIGHT EAR: Primary | ICD-10-CM

## 2021-10-07 DIAGNOSIS — H69.93 ETD (EUSTACHIAN TUBE DYSFUNCTION), BILATERAL: Primary | ICD-10-CM

## 2021-10-07 DIAGNOSIS — H69.93 ETD (EUSTACHIAN TUBE DYSFUNCTION), BILATERAL: ICD-10-CM

## 2021-10-07 DIAGNOSIS — D33.3 VESTIBULAR SCHWANNOMA (H): ICD-10-CM

## 2021-10-07 PROCEDURE — 99024 POSTOP FOLLOW-UP VISIT: CPT | Performed by: OTOLARYNGOLOGY

## 2021-10-07 PROCEDURE — 92567 TYMPANOMETRY: CPT | Mod: 52 | Performed by: AUDIOLOGIST

## 2021-10-07 PROCEDURE — 92557 COMPREHENSIVE HEARING TEST: CPT | Performed by: AUDIOLOGIST

## 2021-10-07 ASSESSMENT — MIFFLIN-ST. JEOR: SCORE: 1307

## 2021-10-07 NOTE — LETTER
"10/7/2021      RE: Shauna Galdamez  [address] [place] [address]         Otolaryngology Clinic      Name: Shauna Galdamez  MRN: [id number]  Age: 46 year old  : 1975  10/07/2021      Chief Complaint:   Follow up    History of Present Illness:   Shauna Galdamez is a 46 year old female with a history of left tympanoplasty with cartilage backing, left myringotomy and t-tube placement on 2021 who presents for follow up. She was last seen in clinic on 2021 and reported good recovery from surgery. The patient endorsed otalgia and the sensation of her ear feeling plugged. She had been working hard with an outside oral surgeon on her TMJ which had been significantly disrupting and impairing her hearing aid usage. She was referred to the TMJ clinic at [facility] and was looking forward to working with them. The patient was started on another 10 day course of drops.     Today, the patient denies any complaints.    Physical Exam:   Ht 1.626 m (5' 4\")   Wt 69.2 kg (152 lb 8.9 oz)   BMI 26.19 kg/m       Female in no acute distress.  Alert and answering questions appropriately.  HB 1/6 bilaterally.  Left ear examined under the microscope. T-tube in place and patent. Graft appears stable without perforation. No retraction.      Audiogram: Audiogram was independently reviewed   AUDIOGRAM: She underwent an audiogram today. This demonstrated:  Right- thresholds within normal limits, no air-bone gaps present; stable air conduction w/ decline 1-4k unmasked bone conduction. Left- moderate sloping to severe mixed hearing loss; 10-25 dB improvement air conduction .25k and 1-2k, 10 dB decrease air conduction 4k and bone conduction stable all frequencies. 96% word rec. right, 84% left; stable bilaterally. PE tube visualized at left eardrum w/ flat tymp and large ear canal volume left. Did not assess right tymp; eardrum perforation visualized.    Right: Speech reception threshold is 15 dB " with 96% word recognition.    Left: Speech reception threshold is 55 dB with 84% word recognition. Tympanogram B type  Her conductive hearing loss on the left has improved by 0-25dB compared to her preoperative test.    Assessment and Plan:  Healed well after left cartilage tympanoplasty and t-tube placement. She also has a vestibular schwannoma on the left which we are observing. She is scheduled for a repeat MRI in February and we'll check her t-tube at that time. She'll be seen yearly for in skullbase clinic for schwannoma follow up and 6 months in between in otology clinic for t-tube follow up. She had her questions answered and expressed understanding.     Scribe Disclosure:  I, Cathryn Easton, am serving as a scribe to document services personally performed by Brigida Mello MD at this visit, based upon the provider's statements to me. All documentation has been reviewed by the aforementioned provider prior to being entered into the official medical record.    The documentation recorded by the scribe accurately reflects the services I personally performed and the decisions made by me.    Brigida Mello MD

## 2021-10-07 NOTE — PATIENT INSTRUCTIONS
1. You were seen in the ENT Clinic today by Dr. Mello.  If you have any questions or concerns after your appointment, please call   - Option 1: ENT Clinic: [phone]   - Option 2: Angela (Dr. Mello's Nurse): [phone]                   Freda(Dr. Mello's Nurse): [phone]    2.   Plan to return to clinic in February with  Dr. Mello after her MRI     Angela Domínguez LPN  [facility] - Otolaryngology

## 2021-10-07 NOTE — PROGRESS NOTES
"  Otolaryngology Clinic      Name: Shauna Galdamez  MRN: [id number]  Age: 46 year old  : 1975  10/07/2021      Chief Complaint:   Follow up    History of Present Illness:   Shauna Galdamez is a 46 year old female with a history of left tympanoplasty with cartilage backing, left myringotomy and t-tube placement on 2021 who presents for follow up. She was last seen in clinic on 2021 and reported good recovery from surgery. The patient endorsed otalgia and the sensation of her ear feeling plugged. She had been working hard with an outside oral surgeon on her TMJ which had been significantly disrupting and impairing her hearing aid usage. She was referred to the TMJ clinic at [facility] and was looking forward to working with them. The patient was started on another 10 day course of drops.     Today, the patient denies any complaints.    Physical Exam:   Ht 1.626 m (5' 4\")   Wt 69.2 kg (152 lb 8.9 oz)   BMI 26.19 kg/m       Female in no acute distress.  Alert and answering questions appropriately.  HB 1/6 bilaterally.  Left ear examined under the microscope. T-tube in place and patent. Graft appears stable without perforation. No retraction.      Audiogram: Audiogram was independently reviewed   AUDIOGRAM: She underwent an audiogram today. This demonstrated:  Right- thresholds within normal limits, no air-bone gaps present; stable air conduction w/ decline 1-4k unmasked bone conduction. Left- moderate sloping to severe mixed hearing loss; 10-25 dB improvement air conduction .25k and 1-2k, 10 dB decrease air conduction 4k and bone conduction stable all frequencies. 96% word rec. right, 84% left; stable bilaterally. PE tube visualized at left eardrum w/ flat tymp and large ear canal volume left. Did not assess right tymp; eardrum perforation visualized.    Right: Speech reception threshold is 15 dB with 96% word recognition.    Left: Speech reception threshold is 55 dB with 84% word " recognition. Tympanogram B type  Her conductive hearing loss on the left has improved by 0-25dB compared to her preoperative test.    Assessment and Plan:  Healed well after left cartilage tympanoplasty and t-tube placement. She also has a vestibular schwannoma on the left which we are observing. She is scheduled for a repeat MRI in February and we'll check her t-tube at that time. She'll be seen yearly for in skullbase clinic for schwannoma follow up and 6 months in between in otology clinic for t-tube follow up. She had her questions answered and expressed understanding.     Scribe Disclosure:  I, Cathryn Easton, am serving as a scribe to document services personally performed by Brigida Mello MD at this visit, based upon the provider's statements to me. All documentation has been reviewed by the aforementioned provider prior to being entered into the official medical record.    The documentation recorded by the scribe accurately reflects the services I personally performed and the decisions made by me.

## 2021-10-07 NOTE — PROGRESS NOTES
AUDIOLOGY REPORT    SUMMARY: Audiology visit completed. See audiogram for results.      RECOMMENDATIONS: Follow-up with ENT.      Lalito Ramos.  Licensed Audiologist  [place] #[id number]

## 2021-10-11 ENCOUNTER — OFFICE VISIT (OUTPATIENT)
Dept: AUDIOLOGY | Facility: CLINIC | Age: 46
End: 2021-10-11
Payer: COMMERCIAL

## 2021-10-11 DIAGNOSIS — H90.72 MIXED CONDUCTIVE AND SENSORINEURAL HEARING LOSS OF LEFT EAR WITH UNRESTRICTED HEARING OF RIGHT EAR: Primary | ICD-10-CM

## 2021-10-11 PROCEDURE — 99207 PR ASSESSMENT FOR HEARING AID: CPT | Performed by: AUDIOLOGIST

## 2021-10-27 NOTE — PROGRESS NOTES
Subjective:  Shauna Galdamez is a 46 yr old female, , who presents to clinic today to address perimenopause symptoms. She was prescribed POP in 2021 to try for irregular menses. No need for contraception due to sperm issue with . Shortly after the period irregularities she began experiencing vasomotor symptoms and stopped taking the POP 2021 since it was providing no relief to the vasomotor symptoms. She has been experiencing hot flashes and night sweats 1-2x/day and insomnia waking up at 2am every night for the last 6 months. Her periods have also been spacing out to every other month lasting 2 days with light bleeding. Her periods in the past were every 28 days. She also is experiencing vaginal dryness, having to use lubricant for intercourse. At this time the lubricant is satisfactory. She is also experiencing joint pain in her hands, which she thinks might be hormonal as it fluctuates throughout the month.    Past Medical History:   Diagnosis Date     Anxiety      Depression      Depressive disorder      Past Surgical History:   Procedure Laterality Date     ANKLE SURGERY Bilateral      ORTHOPEDIC SURGERY       PE TUBES       TYMPANOPLASTY Left 2021    Procedure: TYMPANOPLASTY WITH CARTILAGE BACKING LEFT, T TUBE INSERTION;  Surgeon: Brigida Mello MD;  Location: [facility] OR     Family History   Problem Relation Age of Onset     Skin Cancer Mother         BCC     Mental Illness Mother      Thyroid Disease Mother      Stomach Problem Mother      Hypertension Mother      Substance Abuse Father      No Known Problems Sister      No Known Problems Brother      Heart Disease Maternal Grandmother      No Known Problems Maternal Grandfather      No Known Problems Paternal Grandmother      No Known Problems Other      Melanoma No family hx of      Current Outpatient Medications   Medication     FLUoxetine (PROZAC) 40 MG capsule     No current facility-administered medications for this visit.        Allergies   Allergen Reactions     Sulfa Drugs Unknown     Bupropion Rash     Annotation: rash and joint pain         Objective:  BP (!) 128/90   Pulse 99   Wt 69.3 kg (152 lb 11.2 oz)   LMP 10/19/2021   BMI 26.21 kg/m    Pleasant female in no acute distress, well-oriented, good mentation.  Respiratory: unlabored breathing    Assessment:  - Vasomotor symptoms of perimenopause: hot flashes, sweating, insomnia, vaginal dryness    Plan:  - Counseled on options to manage vasomotor symptoms. Pt is already taking a selective serotonin reuptake inhibitor for mental health. Discussed benefits of estrogen in hormone therapy vs combined hormonal contraceptive pills - administration, mechanism of action, side effects and warning signs reviewed with patient.. Pt desires to start MILLER to manage menstrual irregularity, vasomotor symptoms and hot flashes.  Prescribed Kariva 0.15-0.02/0.01mg tablets.  - Increase calcium to 3 servings per day or 1200mg, vitamin D intake to 800-1000IU, weight bearing activity and eat a well balanced diet. If joint pain does not improve with COCs, recommend further evaluation with internal medicine.   Provided pt with the midlife folder with information on sexuality, diet, management of VMS, and preventive care in perimenopause  - Telephone visit in 2 months to assess vasomotor symptoms then annually if well controlled.    Shauna expressed understanding and agreement with the plan for care.    I, Daysi Prescott, completed the PFSH and ROS. I then acted as a scribe for MATIAS Yuan, for the remainder of the visit.  Daysi Prescott BSN RN MATIAS DNP Student    I agree with the PFSH and ROS as completed by the MATIAS Student, except for changes made by me.  The remainder of the encounter was performed by me and scribed by the MATIAS Student.  The scribed note accurately reflects my personal services and decisions made by me.  Shauna Casillas, ANAIS, APRN, MATIAS

## 2021-10-28 ENCOUNTER — OFFICE VISIT (OUTPATIENT)
Dept: OBGYN | Facility: CLINIC | Age: 46
End: 2021-10-28
Attending: NURSE PRACTITIONER
Payer: COMMERCIAL

## 2021-10-28 VITALS
WEIGHT: 152.7 LBS | SYSTOLIC BLOOD PRESSURE: 128 MMHG | BODY MASS INDEX: 26.21 KG/M2 | DIASTOLIC BLOOD PRESSURE: 90 MMHG | HEART RATE: 99 BPM

## 2021-10-28 DIAGNOSIS — N95.1 PERIMENOPAUSAL VASOMOTOR SYMPTOMS: Primary | ICD-10-CM

## 2021-10-28 PROCEDURE — 99214 OFFICE O/P EST MOD 30 MIN: CPT | Mod: 24 | Performed by: NURSE PRACTITIONER

## 2021-10-28 PROCEDURE — G0463 HOSPITAL OUTPT CLINIC VISIT: HCPCS

## 2021-10-28 RX ORDER — DESOGESTREL AND ETHINYL ESTRADIOL 21-5 (28)
1 KIT ORAL DAILY
Qty: 84 TABLET | Refills: 3 | Status: SHIPPED | OUTPATIENT
Start: 2021-10-28 | End: 2022-09-07

## 2021-10-28 NOTE — LETTER
10/28/2021       RE: Shauna Galdamez  [address]  [place] [address]     Dear Colleague,    Thank you for referring your patient, Shauna Galdamez, to the [facility] [place] at [facility]. Please see a copy of my visit note below.    Subjective:  Shauna Galdamez is a 46 yr old female, , who presents to clinic today to address perimenopause symptoms. She was prescribed POP in 2021 to try for irregular menses. No need for contraception due to sperm issue with . Shortly after the period irregularities she began experiencing vasomotor symptoms and stopped taking the POP 2021 since it was providing no relief to the vasomotor symptoms. She has been experiencing hot flashes and night sweats 1-2x/day and insomnia waking up at 2am every night for the last 6 months. Her periods have also been spacing out to every other month lasting 2 days with light bleeding. Her periods in the past were every 28 days. She also is experiencing vaginal dryness, having to use lubricant for intercourse. At this time the lubricant is satisfactory. She is also experiencing joint pain in her hands, which she thinks might be hormonal as it fluctuates throughout the month.    Past Medical History:   Diagnosis Date     Anxiety      Depression      Depressive disorder      Past Surgical History:   Procedure Laterality Date     ANKLE SURGERY Bilateral      ORTHOPEDIC SURGERY       PE TUBES       TYMPANOPLASTY Left 2021    Procedure: TYMPANOPLASTY WITH CARTILAGE BACKING LEFT, T TUBE INSERTION;  Surgeon: Brigida Mello MD;  Location: [facility] OR     Family History   Problem Relation Age of Onset     Skin Cancer Mother         BCC     Mental Illness Mother      Thyroid Disease Mother      Stomach Problem Mother      Hypertension Mother      Substance Abuse Father      No Known Problems Sister      No Known Problems Brother      Heart  Disease Maternal Grandmother      No Known Problems Maternal Grandfather      No Known Problems Paternal Grandmother      No Known Problems Other      Melanoma No family hx of      Current Outpatient Medications   Medication     FLUoxetine (PROZAC) 40 MG capsule     No current facility-administered medications for this visit.       Allergies   Allergen Reactions     Sulfa Drugs Unknown     Bupropion Rash     Annotation: rash and joint pain         Objective:  BP (!) 128/90   Pulse 99   Wt 69.3 kg (152 lb 11.2 oz)   LMP 10/19/2021   BMI 26.21 kg/m    Pleasant female in no acute distress, well-oriented, good mentation.  Respiratory: unlabored breathing    Assessment:  - Vasomotor symptoms of perimenopause: hot flashes, sweating, insomnia, vaginal dryness    Plan:  - Counseled on options to manage vasomotor symptoms. Pt is already taking a selective serotonin reuptake inhibitor for mental health. Discussed benefits of estrogen in hormone therapy vs combined hormonal contraceptive pills - administration, mechanism of action, side effects and warning signs reviewed with patient.. Pt desires to start MILLER to manage menstrual irregularity, vasomotor symptoms and hot flashes.  Prescribed Kariva 0.15-0.02/0.01mg tablets.  - Increase calcium to 3 servings per day or 1200mg, vitamin D intake to 800-1000IU, weight bearing activity and eat a well balanced diet. If joint pain does not improve with COCs, recommend further evaluation with internal medicine.   Provided pt with the midlife folder with information on sexuality, diet, management of VMS, and preventive care in perimenopause  - Telephone visit in 2 months to assess vasomotor symptoms then annually if well controlled.    Shauna expressed understanding and agreement with the plan for care.    I, Daysi Prescott, completed the PFSH and ROS. I then acted as a scribe for MATIAS Yuan, for the remainder of the visit.  Daysi Prescott BSN RN AIRAMNP DNP Student    I  agree with the PFSH and ROS as completed by the WHNP Student, except for changes made by me.  The remainder of the encounter was performed by me and scribed by the WHNP Student.  The scribed note accurately reflects my personal services and decisions made by me.  Shauna Casillas, DNP, APRN, AIRAMNP

## 2021-11-01 NOTE — TELEPHONE ENCOUNTER
DIAGNOSIS: right foot possible wart  / self / medica   APPOINTMENT DATE: 11/11/2021   NOTES STATUS DETAILS   OFFICE NOTE from referring provider N/A    OFFICE NOTE from other specialist Internal 6/3/2016 PT note from Rene Cartagena, KENDALL     DISCHARGE SUMMARY from hospital N/A    DISCHARGE REPORT from the ER N/A    OPERATIVE REPORT N/A    EMG report N/A    MEDICATION LIST Internal    MRI N/A    DEXA (osteoporosis/bone health) N/A    CT SCAN N/A    XRAYS (IMAGES & REPORTS) N/A

## 2021-11-02 ENCOUNTER — TRANSFERRED RECORDS (OUTPATIENT)
Dept: HEALTH INFORMATION MANAGEMENT | Facility: CLINIC | Age: 46
End: 2021-11-02
Payer: COMMERCIAL

## 2021-11-11 ENCOUNTER — OFFICE VISIT (OUTPATIENT)
Dept: ORTHOPEDICS | Facility: CLINIC | Age: 46
End: 2021-11-11
Payer: COMMERCIAL

## 2021-11-11 ENCOUNTER — PRE VISIT (OUTPATIENT)
Dept: ORTHOPEDICS | Facility: CLINIC | Age: 46
End: 2021-11-11

## 2021-11-11 VITALS — BODY MASS INDEX: 25.95 KG/M2 | HEIGHT: 64 IN | WEIGHT: 152 LBS

## 2021-11-11 DIAGNOSIS — L98.9 SKIN LESION: Primary | ICD-10-CM

## 2021-11-11 PROCEDURE — 99212 OFFICE O/P EST SF 10 MIN: CPT | Mod: GC | Performed by: STUDENT IN AN ORGANIZED HEALTH CARE EDUCATION/TRAINING PROGRAM

## 2021-11-11 ASSESSMENT — MIFFLIN-ST. JEOR: SCORE: 1304.47

## 2021-11-11 NOTE — PROGRESS NOTES
Background:   Date of injury: NA  Duration of symptoms: 5-6 weeks  Mechanism of Injury: NA  Intensity: 3/10  Aggravating factors: Shoes  Relieving Factors: NA  Prior Evaluation: NA

## 2021-11-11 NOTE — PROGRESS NOTES
"[facility]  Sports Medicine Clinic  Clinics and Surgery Center           SUBJECTIVE       Shauna Galdamez is a 46 year old female presenting to clinic today with a chief complaint of possible wart on toe.     For the past 5-6 weeks she has had wound on her 2nd right toe on the dorsal aspect. It was initially a painful bump and now it has improved. For a period of time it was draining some clear fluid but now that has stopped. She doesn't recall an injury but did wear a tighter pair of shoes around the time it started. She has not worn these since. She thinks it might be a wart. She has never had a plantar warts before. Over the past 1-2 weeks it has significantly gotten better.     PMH, Medications and Allergies were reviewed and updated as needed.    ROS:  As noted above otherwise negative.    Patient Active Problem List   Diagnosis     Family history of melanoma     Multiple benign nevi     Cherry angioma     Solar lentiginosis     Dermal and epidermal nevus     Pilar cyst     Keloid scar     Anxiety     Closed fracture of tibia     PMS (premenstrual syndrome)     Episode of recurrent major depressive disorder, unspecified depression episode severity (H)     Perforation of tympanic membrane, bilateral     Mixed conductive and sensorineural hearing 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       Current Outpatient Medications   Medication Sig Dispense Refill     desogestrel-ethinyl estradiol (KARIVA) 0.15-0.02/0.01 MG (21/5) tablet Take 1 tablet by mouth daily 84 tablet 3     FLUoxetine (PROZAC) 40 MG capsule               OBJECTIVE:       Vitals:   Vitals:    11/11/21 1353   Weight: 68.9 kg (152 lb)   Height: 1.626 m (5' 4\")     BMI: Body mass index is 26.09 kg/m .    Gen:  Well nourished and in no acute distress  HEENT: Extraocular movement intact  Neck: Supple  Pulm:  Breathing Comfortably. No increased respiratory effort.  Psych: Euthymic. Appropriately answers " questions    Right 2nd Toe: Small skin lesion with callous overlying. Callous was pared down using a #15 blade and underneath revealing a small healing wound without evidence of wart.           ASSESSMENT and PLAN:     Shauna was seen today for consult.    Diagnoses and all orders for this visit:    Skin lesion: On 2nd great toe, appears to be a healing traumatic ulceration with callous formation on top but without evidence of wart.   - reassurance provided  - attempt to relieve pressure off of it with donut pads  - if not continuing to improve over the next 2 weeks follow up with podiatry      Options for treatment and/or follow-up care were reviewed with the patient was actively involved in the decision making process. Patient verbalized understanding and was in agreement with the plan.    The patient was seen by and discussed with Dr.Suzanne RILEY Ruiz MD, CAQ, CCD    Tomasa Rea MD  Primary Care Sports Medicine Fellow

## 2021-11-11 NOTE — LETTER
"   11/11/2021      RE: Shauna Galdamez  [address]  [place] [address]       [facility]  Sports Medicine Clinic  Clinics and Surgery Center           SUBJECTIVE       Shauna Galdamez is a 46 year old female presenting to clinic today with a chief complaint of possible wart on toe.     For the past 5-6 weeks she has had wound on her 2nd right toe on the dorsal aspect. It was initially a painful bump and now it has improved. For a period of time it was draining some clear fluid but now that has stopped. She doesn't recall an injury but did wear a tighter pair of shoes around the time it started. She has not worn these since. She thinks it might be a wart. She has never had a plantar warts before. Over the past 1-2 weeks it has significantly gotten better.     PMH, Medications and Allergies were reviewed and updated as needed.    ROS:  As noted above otherwise negative.    Patient Active Problem List   Diagnosis     Family history of melanoma     Multiple benign nevi     Cherry angioma     Solar lentiginosis     Dermal and epidermal nevus     Pilar cyst     Keloid scar     Anxiety     Closed fracture of tibia     PMS (premenstrual syndrome)     Episode of recurrent major depressive disorder, unspecified depression episode severity (H)     Perforation of tympanic membrane, bilateral     Mixed conductive and sensorineural hearing 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       Current Outpatient Medications   Medication Sig Dispense Refill     desogestrel-ethinyl estradiol (KARIVA) 0.15-0.02/0.01 MG (21/5) tablet Take 1 tablet by mouth daily 84 tablet 3     FLUoxetine (PROZAC) 40 MG capsule               OBJECTIVE:       Vitals:   Vitals:    11/11/21 1353   Weight: 68.9 kg (152 lb)   Height: 1.626 m (5' 4\")     BMI: Body mass index is 26.09 kg/m .    Gen:  Well nourished and in no acute distress  HEENT: Extraocular movement intact  Neck: Supple  Pulm:  Breathing " Comfortably. No increased respiratory effort.  Psych: Euthymic. Appropriately answers questions    Right 2nd Toe: Small skin lesion with callous overlying. Callous was pared down using a #15 blade and underneath revealing a small healing wound without evidence of wart.           ASSESSMENT and PLAN:     Shauna was seen today for consult.    Diagnoses and all orders for this visit:    Skin lesion: On 2nd great toe, appears to be a healing traumatic ulceration with callous formation on top but without evidence of wart.   - reassurance provided  - attempt to relieve pressure off of it with donut pads  - if not continuing to improve over the next 2 weeks follow up with podiatry      Options for treatment and/or follow-up care were reviewed with the patient was actively involved in the decision making process. Patient verbalized understanding and was in agreement with the plan.    The patient was seen by and discussed with Dr.Suzanne RILEY Ruiz MD, CAQ, CCD    Tomasa Rea MD  Primary Care Sports Medicine Fellow    Background:   Date of injury: NA  Duration of symptoms: 5-6 weeks  Mechanism of Injury: NA  Intensity: 3/10  Aggravating factors: Shoes  Relieving Factors: NA  Prior Evaluation: NA         Attending Note:   I have examined this patient and have reviewed the clinical presentation and progress note with the fellow. I agree with the treatment plan as outlined. The plan was formulated with the fellow on the day of the patient's visit. I have supervised the procedure.   Joaquina Ruiz MD, CAQ, CCD  [facility]  Sports Medicine and Bone Health

## 2021-11-17 NOTE — PROGRESS NOTES
Attending Note:   I have examined this patient and have reviewed the clinical presentation and progress note with the fellow. I agree with the treatment plan as outlined. The plan was formulated with the fellow on the day of the patient's visit. I have supervised the procedure.   Joaquina Ruiz MD, CAQ, CCD  [facility]  Sports Medicine and Bone Health

## 2021-11-29 ENCOUNTER — IMMUNIZATION (OUTPATIENT)
Dept: PEDIATRICS | Facility: CLINIC | Age: 46
End: 2021-11-29
Payer: COMMERCIAL

## 2021-11-29 PROCEDURE — 90471 IMMUNIZATION ADMIN: CPT

## 2021-11-29 PROCEDURE — 90686 IIV4 VACC NO PRSV 0.5 ML IM: CPT

## 2021-12-01 NOTE — TELEPHONE ENCOUNTER
RECORDS RECEIVED FROM: (R) Foot - wart / Yarelis Galloway / [place]omas[place] Rea [place] [facility]   DATE RECEIVED: Jan 5, 2022     NOTES STATUS DETAILS   OFFICE NOTE from referring provider Internal  Tomasa Rea MD

## 2021-12-27 NOTE — PROGRESS NOTES
Subjective:  Shauna Galdamez is a 46 yr old female who requests a telephone encounter today to follow-up on med management of vasomotor symptoms related to perimenopause.    Pt was started on COCS 10/2021 to manage VMS. She had been experiencing hot flashes and night   sweats 1-2x/day.  Hot flashes have really improved. When she does get them, they are associated with exertion and they are managable.  She has continued to have insomnia,  waking up at 2am every night for the last ~8 months. This is not related to night sweats, however.  She is awake for about 2 hrs after she wakes. Takes melatonin in the morning. Doesn't matter what time she goes to bed.  Drinks caffeine in the morning, nothing after her morning cup of coffee.     Had also been experiencing vaginal dryness and using lubricant for intercourse. Reports today that vaginal dryness is not bothersome to her.  She also was experiencing joint pain in her hands, which she thought was hormonal as it fluctuates throughout the month. Mild improvement since starting the COCs, but it is not gone.     Menstrual periods: had been occurring every other month and lasting about 2 days with light bleeding. With COCs, she has been getting monthly bleeding during placebo week, lasts about 3-4 days, moderate or light bleeding. No dysmenorrhea or other symptoms.     Objective:  There were no vitals taken for this visit. due to virtual visit  General: pleasant female in no acute distress  Psych: normal mentation, well oriented  Respiratory:  Able to talk in complete sentences.     Assessment:  Encounter Diagnosis   Name Primary?     Perimenopausal vasomotor symptoms      Plan:   COCs refilled for 1 yr.  Try melatonin & COCs at night rather than morning; reach out to provider if sleep continues to be disrupted and may try Hydroxyzine or Trazodone.   Follow-up Fall 2022.     Shauna expressed understanding and agreement with the plan for care.    Time: 13  minutes  Shauna Casillas, DNP, APRN, WHNP

## 2021-12-28 ENCOUNTER — VIRTUAL VISIT (OUTPATIENT)
Dept: OBGYN | Facility: CLINIC | Age: 46
End: 2021-12-28
Attending: NURSE PRACTITIONER
Payer: COMMERCIAL

## 2021-12-28 DIAGNOSIS — N95.1 PERIMENOPAUSAL VASOMOTOR SYMPTOMS: ICD-10-CM

## 2021-12-28 PROCEDURE — 99212 OFFICE O/P EST SF 10 MIN: CPT | Mod: TEL | Performed by: NURSE PRACTITIONER

## 2021-12-28 NOTE — LETTER
12/28/2021       RE: Shauna Galdamez  [address]  [place] [address]     Dear Colleague,    Thank you for referring your patient, Shauna Galdamez, to the [facility] WOMEN[facility]S CL[facility] [place] at [facility]. Please see a copy of my visit note below.    Subjective:  Shauna Galdamez is a 46 yr old female who requests a telephone encounter today to follow-up on med management of vasomotor symptoms related to perimenopause.    Pt was started on COCS 10/2021 to manage VMS. She had been experiencing hot flashes and night   sweats 1-2x/day.  Hot flashes have really improved. When she does get them, they are associated with exertion and they are managable.  She has continued to have insomnia,  waking up at 2am every night for the last ~8 months. This is not related to night sweats, however.  She is awake for about 2 hrs after she wakes. Takes melatonin in the morning. Doesn't matter what time she goes to bed.  Drinks caffeine in the morning, nothing after her morning cup of coffee.     Had also been experiencing vaginal dryness and using lubricant for intercourse. Reports today that vaginal dryness is not bothersome to her.  She also was experiencing joint pain in her hands, which she thought was hormonal as it fluctuates throughout the month. Mild improvement since starting the COCs, but it is not gone.     Menstrual periods: had been occurring every other month and lasting about 2 days with light bleeding. With COCs, she has been getting monthly bleeding during placebo week, lasts about 3-4 days, moderate or light bleeding. No dysmenorrhea or other symptoms.     Objective:  There were no vitals taken for this visit. due to virtual visit  General: pleasant female in no acute distress  Psych: normal mentation, well oriented  Respiratory:  Able to talk in complete sentences.     Assessment:  Encounter Diagnosis   Name Primary?      Perimenopausal vasomotor symptoms      Plan:   COCs refilled for 1 yr.  Try melatonin & COCs at night rather than morning; reach out to provider if sleep continues to be disrupted and may try Hydroxyzine or Trazodone.   Follow-up Fall 2022.     Shauna expressed understanding and agreement with the plan for care.    Time: 13 minutes        Again, thank you for allowing me to participate in the care of your patient.      Sincerely,    GT Johnston CNP

## 2022-01-05 ENCOUNTER — OFFICE VISIT (OUTPATIENT)
Dept: ORTHOPEDICS | Facility: CLINIC | Age: 47
End: 2022-01-05
Payer: COMMERCIAL

## 2022-01-05 ENCOUNTER — PRE VISIT (OUTPATIENT)
Dept: ORTHOPEDICS | Facility: CLINIC | Age: 47
End: 2022-01-05

## 2022-01-05 DIAGNOSIS — M67.40 MUCOID CYST OF JOINT: Primary | ICD-10-CM

## 2022-01-05 PROCEDURE — 99202 OFFICE O/P NEW SF 15 MIN: CPT | Performed by: PODIATRIST

## 2022-01-05 NOTE — PROGRESS NOTES
Date of Service: 1/5/2022    Chief Complaint:   Chief Complaint   Patient presents with     Consult     Wart vs callus.         HPI: Shauna is a 46 year old female who presents today for further evaluation of right second digit DIPJ cyst.  She initially thought this was a wart I went to see sports medicine.  They told her it was not and referred her here.  She relates it does cause pain in some shoes.    Review of Systems: No nausea, vomiting, diarrhea, fever, chills, night sweats, shortness of breath, chest pain.    PMH:   Past Medical History:   Diagnosis Date     Anxiety      Depression      Depressive disorder        PSxH:   Past Surgical History:   Procedure Laterality Date     ANKLE SURGERY Bilateral      ORTHOPEDIC SURGERY       PE TUBES       TYMPANOPLASTY Left 8/11/2021    Procedure: TYMPANOPLASTY WITH CARTILAGE BACKING LEFT, T TUBE INSERTION;  Surgeon: Brigida Mello MD;  Location: [facility] OR       Allergies: Sulfa drugs and Bupropion    SH:   Social History     Socioeconomic History     Marital status:      Spouse name: Not on file     Number of children: Not on file     Years of education: Not on file     Highest education level: Not on file   Occupational History     Not on file   Tobacco Use     Smoking status: Never Smoker     Smokeless tobacco: Never Used   Substance and Sexual Activity     Alcohol use: Yes     Comment: glass of wine/night     Drug use: No     Sexual activity: Yes     Partners: Male     Birth control/protection: None   Other Topics Concern     Parent/sibling w/ CABG, MI or angioplasty before 65F 55M? Not Asked   Social History Narrative    How much exercise per week? Few walks and yoga    How much calcium per day? Through foods       How much caffeine per day? Two cups coffee    How much vitamin D per day? Through foods    Do you/your family wear seatbelts?  Yes    Do you/your family use safety helmets? Yes    Do you/your family use sunscreen? Yes    Do you/your family keep  firearms in the home? No    Do you/your family have a smoke detector(s)? Yes                     Social Determinants of Health     Financial Resource Strain: Not on file   Food Insecurity: Not on file   Transportation Needs: Not on file   Physical Activity: Not on file   Stress: Not on file   Social Connections: Not on file   Intimate Partner Violence: Not on file   Housing Stability: Not on file       FH:   Family History   Problem Relation Age of Onset     Skin Cancer Mother         BCC     Mental Illness Mother      Thyroid Disease Mother      Stomach Problem Mother      Hypertension Mother      Substance Abuse Father      No Known Problems Sister      No Known Problems Brother      Heart Disease Maternal Grandmother      No Known Problems Maternal Grandfather      No Known Problems Paternal Grandmother      No Known Problems Other      Melanoma No family hx of        Objective:  Data Unavailable Data Unavailable Data Unavailable Data Unavailable Data Unavailable 0 lbs 0 oz    PT and DP pulses are 2/4 bilaterally. CRT is instant.  Positive pedal hair.   Gross sensation is intact bilaterally.   Equinus is noted bilaterally. No pain with active or passive ROM of the ankle, MTJ, 1st ray, or halluces bilaterally,.  Mucoid cyst noted to the right second DIPJ.  No opening noted today.  Nails normal bilaterally. No open lesions are noted.     Assessment: Shauna is a 46-year-old with mucoid cyst on the right foot.  I discussed her treatment options.  In order to get this to completely go away, these usually need to be surgically excised.  I did discuss with her that she can try a silicone toe sleeve in the interim, while she waits to see Dr. Helton.  She does agree to this plan.    Plan:  - Pt seen and evaluated.  -I did dispense to her silicone toe sleeves.  She can use these while she waits for Dr. Alegria.  -Referral was written to see Dr. Helton.  See again as needed.-

## 2022-01-05 NOTE — LETTER
1/5/2022         RE: Shauna Galdamez  [address] [place] [address]        Dear Colleague,    Thank you for referring your patient, Shauna Galdamez, to the [facility] [place]. Please see a copy of my visit note below.    Date of Service: 1/5/2022    Chief Complaint:   Chief Complaint   Patient presents with     Consult     Wart vs callus.         HPI: Shauna is a 46 year old female who presents today for further evaluation of right second digit DIPJ cyst.  She initially thought this was a wart I went to see sports medicine.  They told her it was not and referred her here.  She relates it does cause pain in some shoes.    Review of Systems: No nausea, vomiting, diarrhea, fever, chills, night sweats, shortness of breath, chest pain.    PMH:   Past Medical History:   Diagnosis Date     Anxiety      Depression      Depressive disorder        PSxH:   Past Surgical History:   Procedure Laterality Date     ANKLE SURGERY Bilateral      ORTHOPEDIC SURGERY       PE TUBES       TYMPANOPLASTY Left 8/11/2021    Procedure: TYMPANOPLASTY WITH CARTILAGE BACKING LEFT, T TUBE INSERTION;  Surgeon: Brigida Mello MD;  Location: [facility] OR       Allergies: Sulfa drugs and Bupropion    SH:   Social History     Socioeconomic History     Marital status:      Spouse name: Not on file     Number of children: Not on file     Years of education: Not on file     Highest education level: Not on file   Occupational History     Not on file   Tobacco Use     Smoking status: Never Smoker     Smokeless tobacco: Never Used   Substance and Sexual Activity     Alcohol use: Yes     Comment: glass of wine/night     Drug use: No     Sexual activity: Yes     Partners: Male     Birth control/protection: None   Other Topics Concern     Parent/sibling w/ CABG, MI or angioplasty before 65F 55M? Not Asked   Social History Narrative    How much exercise per week? Few walks and yoga    How much  calcium per day? Through foods       How much caffeine per day? Two cups coffee    How much vitamin D per day? Through foods    Do you/your family wear seatbelts?  Yes    Do you/your family use safety helmets? Yes    Do you/your family use sunscreen? Yes    Do you/your family keep firearms in the home? No    Do you/your family have a smoke detector(s)? Yes                     Social Determinants of Health     Financial Resource Strain: Not on file   Food Insecurity: Not on file   Transportation Needs: Not on file   Physical Activity: Not on file   Stress: Not on file   Social Connections: Not on file   Intimate Partner Violence: Not on file   Housing Stability: Not on file       FH:   Family History   Problem Relation Age of Onset     Skin Cancer Mother         BCC     Mental Illness Mother      Thyroid Disease Mother      Stomach Problem Mother      Hypertension Mother      Substance Abuse Father      No Known Problems Sister      No Known Problems Brother      Heart Disease Maternal Grandmother      No Known Problems Maternal Grandfather      No Known Problems Paternal Grandmother      No Known Problems Other      Melanoma No family hx of        Objective:  Data Unavailable Data Unavailable Data Unavailable Data Unavailable Data Unavailable 0 lbs 0 oz    PT and DP pulses are 2/4 bilaterally. CRT is instant.  Positive pedal hair.   Gross sensation is intact bilaterally.   Equinus is noted bilaterally. No pain with active or passive ROM of the ankle, MTJ, 1st ray, or halluces bilaterally,.  Mucoid cyst noted to the right second DIPJ.  No opening noted today.  Nails normal bilaterally. No open lesions are noted.     Assessment: Shauna is a 46-year-old with mucoid cyst on the right foot.  I discussed her treatment options.  In order to get this to completely go away, these usually need to be surgically excised.  I did discuss with her that she can try a silicone toe sleeve in the interim, while she waits to see   Danie.  She does agree to this plan.    Plan:  - Pt seen and evaluated.  -I did dispense to her silicone toe sleeves.  She can use these while she waits for Dr. Alegria.  -Referral was written to see Dr. Helton.  See again as needed.-               Again, thank you for allowing me to participate in the care of your patient.        Sincerely,        Mickey Yao DPM

## 2022-01-05 NOTE — LETTER
Date:January 5, 2022      Patient was self referred, no letter generated. Do not send.        [facility] Health Information

## 2022-01-05 NOTE — NURSING NOTE
Reason For Visit:   Chief Complaint   Patient presents with     Consult     Wart vs callus.        Pain Assessment  Patient Currently in Pain: Denies        Allergies   Allergen Reactions     Sulfa Drugs Unknown     Bupropion Rash     Annotation: rash and joint pain             Megan Javier LPN

## 2022-01-25 DIAGNOSIS — M67.40 MUCOID CYST OF JOINT: Primary | ICD-10-CM

## 2022-01-26 ENCOUNTER — OFFICE VISIT (OUTPATIENT)
Dept: URGENT CARE | Facility: URGENT CARE | Age: 47
End: 2022-01-26
Payer: COMMERCIAL

## 2022-01-26 ENCOUNTER — ANCILLARY PROCEDURE (OUTPATIENT)
Dept: GENERAL RADIOLOGY | Facility: CLINIC | Age: 47
End: 2022-01-26
Attending: INTERNAL MEDICINE
Payer: COMMERCIAL

## 2022-01-26 VITALS
RESPIRATION RATE: 16 BRPM | DIASTOLIC BLOOD PRESSURE: 82 MMHG | WEIGHT: 150 LBS | HEART RATE: 79 BPM | SYSTOLIC BLOOD PRESSURE: 112 MMHG | TEMPERATURE: 98.8 F | BODY MASS INDEX: 25.61 KG/M2 | OXYGEN SATURATION: 97 % | HEIGHT: 64 IN

## 2022-01-26 DIAGNOSIS — S59.911A FOREARM INJURY, RIGHT, INITIAL ENCOUNTER: ICD-10-CM
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
## 2022-01-26 DIAGNOSIS — S63.501A FOREARM SPRAIN, RIGHT, INITIAL ENCOUNTER: Primary | ICD-10-CM

## 2022-01-26 PROCEDURE — 99203 OFFICE O/P NEW LOW 30 MIN: CPT | Performed by: INTERNAL MEDICINE

## 2022-01-26 PROCEDURE — 73090 X-RAY EXAM OF FOREARM: CPT | Mod: RT | Performed by: RADIOLOGY

## 2022-01-26 ASSESSMENT — ENCOUNTER SYMPTOMS: SLEEP DISTURBANCE: 1

## 2022-01-26 ASSESSMENT — MIFFLIN-ST. JEOR: SCORE: 1295.4

## 2022-01-26 NOTE — PATIENT INSTRUCTIONS
X-ray shows no evidence of fracture.  No lesions present on films    No heavy lifting with right arm over next month  Wear fore arm splint for up to 1 month and while sleeping since it wakes you up at night    See orthopedics, if symptoms still not improving as expected.

## 2022-01-26 NOTE — PROGRESS NOTES
"ASSESSMENT AND PLAN:      ICD-10-CM    1. Forearm sprain, right, initial encounter  S63.501A    2. Forearm injury, right, initial encounter  S59.911A XR Forearm Right 2 Views     Wrist/Arm/Hand Supplies Order for DME - ONLY FOR DME   3. Fall due to slipping on ice or snow, initial encounter  W00.9XXA XR Forearm Right 2 Views     Wrist/Arm/Hand Supplies Order for DME - ONLY FOR DME     right forearm after injury from fall  Ongoing pain waking her up at night, red flag  Concern for potential subtle fracture/occult, avulsion  X-ray performed    PLAN:      Patient Instructions     No heavy lifting with right arm over next month  Wear fore arm splint for 1 month    See orthopedics, if symptoms still not improving as expected.        Return if symptoms worsen or fail to improve.        Adeline Read MD  [facility] URGENT CARE    Subjective     Shauna Galdamez is a 46 year old who presents for Patient presents with:  Urgent Care  Musculoskeletal Problem: fell about 4-5 weeks ago on the ice. Didn't think it was broken but keeping her up at night.     an established patient of [facility].    MS Injury/Pain    Onset of symptoms was >1 month(s) ago.  Location: right wrist  Context:       The injury happened while while walking      Mechanism: fall , slipped on ice,direct blow with right wrist extended      Patient experienced immediate pain  Pain now 3/10  Current and Associated symptoms: Pain and ? Decreased range of motion  Denies  Swelling, Bruising and Stiffness  Aggravating Factors: certain movements  Therapies to improve symptoms include: heat    right handed   teaches    Review of Systems   Psychiatric/Behavioral: Positive for sleep disturbance.           Objective    /82   Pulse 79   Temp 98.8  F (37.1  C) (Temporal)   Resp 16   Ht 1.626 m (5' 4\")   Wt 68 kg (150 lb)   SpO2 97%   Breastfeeding No   BMI 25.75 kg/m    Physical Exam  Vitals reviewed.   Constitutional:       Appearance: " Normal appearance. She is not ill-appearing.   Musculoskeletal:      Comments: right upper extremity  Examination of right hand/fingers and wrist are unremarkable.  Negative snuffbox tenderness    Pain localized to distal third between ulna and radius.  No pinpoint tenderness noted  No swelling or ecchymosis.  No deformity noted   Neurological:      Mental Status: She is alert.            Xray - Reviewed and interpreted by me.  No fracture or lesions noted

## 2022-02-04 ENCOUNTER — ANCILLARY PROCEDURE (OUTPATIENT)
Dept: MRI IMAGING | Facility: CLINIC | Age: 47
End: 2022-02-04
Attending: OTOLARYNGOLOGY
Payer: COMMERCIAL

## 2022-02-04 DIAGNOSIS — D33.3 VESTIBULAR SCHWANNOMA (H): ICD-10-CM

## 2022-02-04 PROCEDURE — 70553 MRI BRAIN STEM W/O & W/DYE: CPT | Performed by: RADIOLOGY

## 2022-02-04 PROCEDURE — A9585 GADOBUTROL INJECTION: HCPCS | Performed by: RADIOLOGY

## 2022-02-04 RX ORDER — GADOBUTROL 604.72 MG/ML
7.5 INJECTION INTRAVENOUS ONCE
Status: COMPLETED | OUTPATIENT
Start: 2022-02-04 | End: 2022-02-04

## 2022-02-04 RX ADMIN — GADOBUTROL 7 ML: 604.72 INJECTION INTRAVENOUS at 10:26

## 2022-02-15 ENCOUNTER — OFFICE VISIT (OUTPATIENT)
Dept: ORTHOPEDICS | Facility: CLINIC | Age: 47
End: 2022-02-15
Payer: COMMERCIAL

## 2022-02-15 ENCOUNTER — ANCILLARY PROCEDURE (OUTPATIENT)
Dept: GENERAL RADIOLOGY | Facility: CLINIC | Age: 47
End: 2022-02-15
Attending: ORTHOPAEDIC SURGERY
Payer: COMMERCIAL

## 2022-02-15 VITALS — HEIGHT: 64 IN | BODY MASS INDEX: 26.12 KG/M2 | WEIGHT: 153 LBS

## 2022-02-15 DIAGNOSIS — M67.40 MUCOID CYST OF JOINT: ICD-10-CM

## 2022-02-15 PROCEDURE — 73630 X-RAY EXAM OF FOOT: CPT | Mod: RT | Performed by: RADIOLOGY

## 2022-02-15 PROCEDURE — 99204 OFFICE O/P NEW MOD 45 MIN: CPT | Performed by: ORTHOPAEDIC SURGERY

## 2022-02-15 ASSESSMENT — MIFFLIN-ST. JEOR: SCORE: 1309

## 2022-02-15 NOTE — NURSING NOTE
"Reason For Visit:   Chief Complaint   Patient presents with     Consult     Right foot mucoid cyst       Ht 1.626 m (5' 4\")   Wt 69.4 kg (153 lb)   BMI 26.26 kg/m      Pain Assessment  Patient Currently in Pain: Gareth Gay, ATC    "

## 2022-02-15 NOTE — LETTER
2/15/2022         RE: Shauna Galdamez  [address]  [place] [address]        Dear Colleague,    Thank you for referring your patient, Shauna Galdamez, to the [facility] [place]. Please see a copy of my visit note below.    CHIEF COMPLAINT:  Right second toe soft tissue mass.    HISTORY OF PRESENT ILLNESS:  Ms. Galdamez is a 46-year-old female who presents today for evaluation of her right second toe.  The patient reports to have pain and discomfort as well as the formation of a soft tissue mass that had eventually pops.  She will get a gelatinous fluid out of the mass and then it will scab and she will repeat the cycle.  The patient reports now to be interested in a more long-term solution as she is struggling with a variety of shoes that she can wear.    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    Denies to have any problems in any other joints of the foot.    PAST MEDICAL HISTORY:  Anxiety, depression.    PAST SURGICAL HISTORY:  Reviewed today.    DRUG ALLERGIES:  Sulfa, bupropion.     CURRENT MEDICATIONS:  Please refer to encounter form.    PHYSICAL EXAMINATION:  On today's visit, she presents as a very pleasant female in no apparent distress with a height of 5 feet 4 inches and a weight of 153 pounds.  Denies to have any constitutional symptoms.    On today's visit, she presents with full range of motion of the right ankle, hindfoot and midfoot joints.  CMS intact.  Skin is intact.  There are palpable pulses.  Presents with a scab along the lateral aspect of the DIP joint of the second toe, which is where the cyst was formerly located.  There are no signs of infection or inflammation.    ASSESSMENT:  Right second toe mucoid cyst.    PLAN:  Discussed with the patient the natural history of  her condition as well as the intervention that will improve things long term.  I discussed with her the most likely postoperative course and complications from undergoing a mucoid cyst excision from the right second toe.  Complications include but are not limited to infection, bleeding, nerve damage, residual pain, recurrence of the cyst and stiffness.    All questions were answered.  Patient was pleased with the discussion.  The patient was schedule surgery at the best of her convenience, which again will consist of a right second toe mucoid cyst excision.  In the meantime, she has no activity restrictions.  All questions were answered.    TT:  30 minutes.  CT:  20 minutes.          Again, thank you for allowing me to participate in the care of your patient.        Sincerely,        Yefri Helton MD

## 2022-02-15 NOTE — LETTER
Date:February 16, 2022      Provider requested that no letter be sent. Do not send.       [facility]

## 2022-02-15 NOTE — PROGRESS NOTES
CHIEF COMPLAINT:  Right second toe soft tissue mass.    HISTORY OF PRESENT ILLNESS:  Ms. Galdamez is a 46-year-old female who presents today for evaluation of her right second toe.  The patient reports to have pain and discomfort as well as the formation of a soft tissue mass that had eventually pops.  She will get a gelatinous fluid out of the mass and then it will scab and she will repeat the cycle.  The patient reports now to be interested in a more long-term solution as she is struggling with a variety of shoes that she can wear.    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    Denies to have any problems in any other joints of the foot.    PAST MEDICAL HISTORY:  Anxiety, depression.    PAST SURGICAL HISTORY:  Reviewed today.    DRUG ALLERGIES:  Sulfa, bupropion.     CURRENT MEDICATIONS:  Please refer to encounter form.    PHYSICAL EXAMINATION:  On today's visit, she presents as a very pleasant female in no apparent distress with a height of 5 feet 4 inches and a weight of 153 pounds.  Denies to have any constitutional symptoms.    On today's visit, she presents with full range of motion of the right ankle, hindfoot and midfoot joints.  CMS intact.  Skin is intact.  There are palpable pulses.  Presents with a scab along the lateral aspect of the DIP joint of the second toe, which is where the cyst was formerly located.  There are no signs of infection or inflammation.    ASSESSMENT:  Right second toe mucoid cyst.    PLAN:  Discussed with the patient the natural history of her condition as well as the intervention that will improve things long term.  I discussed with her the most likely postoperative course and complications from undergoing a mucoid cyst excision from the right second toe.  Complications include but are not limited to infection,  bleeding, nerve damage, residual pain, recurrence of the cyst and stiffness.    All questions were answered.  Patient was pleased with the discussion.  The patient was schedule surgery at the best of her convenience, which again will consist of a right second toe mucoid cyst excision.  In the meantime, she has no activity restrictions.  All questions were answered.    TT:  30 minutes.  CT:  20 minutes.

## 2022-02-15 NOTE — NURSING NOTE
Teaching Flowsheet   Relevant Diagnosis: R foot mucoid cyst excision  Teaching Topic: R foot mucoid cyst excision     RN Note: Pt is calm and cooperative, asking questions appropriately. Pt was instructed on pre-surgical packet requirements including H&P, COVID-19 test, NPO, and pre-surgical scrub. Pt verbalized understanding. Pt will schedule H&P c PCP, COVID test 3-4 days before surgery, packet and scrub given to pt.     Person(s) involved in teaching:   Patient     Motivation Level:  Asks Questions: Yes  Eager to Learn: Yes  Cooperative: Yes  Receptive (willing/able to accept information): Yes  Any cultural factors/[employer]al beliefs that may influence understanding or compliance? No     Patient demonstrates understanding of the following:  Reason for the appointment, diagnosis and treatment plan: Yes  Knowledge of proper use of medications and conditions for which they are ordered (with special attention to potential side effects or drug interactions): Yes  Which situations necessitate calling provider and whom to contact: Yes    Proper use and care of (medical equip, care aids, etc.): Yes  Nutritional needs and diet plan: Yes  Pain management techniques: Yes  Wound Care: Yes  How and/when to access community resources: Yes    Brent Snowden RNCC

## 2022-02-24 ENCOUNTER — OFFICE VISIT (OUTPATIENT)
Dept: OTOLARYNGOLOGY | Facility: CLINIC | Age: 47
End: 2022-02-24
Payer: COMMERCIAL

## 2022-02-24 ENCOUNTER — APPOINTMENT (OUTPATIENT)
Dept: OTOLARYNGOLOGY | Facility: CLINIC | Age: 47
End: 2022-02-24
Payer: COMMERCIAL

## 2022-02-24 VITALS
SYSTOLIC BLOOD PRESSURE: 138 MMHG | HEART RATE: 85 BPM | WEIGHT: 155 LBS | BODY MASS INDEX: 26.46 KG/M2 | DIASTOLIC BLOOD PRESSURE: 92 MMHG | HEIGHT: 64 IN | TEMPERATURE: 98.3 F

## 2022-02-24 DIAGNOSIS — H69.93 ETD (EUSTACHIAN TUBE DYSFUNCTION), BILATERAL: ICD-10-CM

## 2022-02-24 DIAGNOSIS — H90.A32 MIXED CONDUCTIVE AND SENSORINEURAL HEARING LOSS OF LEFT EAR WITH RESTRICTED HEARING OF RIGHT EAR: ICD-10-CM

## 2022-02-24 DIAGNOSIS — D33.3 VESTIBULAR SCHWANNOMA (H): Primary | ICD-10-CM

## 2022-02-24 PROCEDURE — 92504 EAR MICROSCOPY EXAMINATION: CPT | Performed by: OTOLARYNGOLOGY

## 2022-02-24 PROCEDURE — 99213 OFFICE O/P EST LOW 20 MIN: CPT | Mod: 25 | Performed by: OTOLARYNGOLOGY

## 2022-02-24 ASSESSMENT — PAIN SCALES - GENERAL: PAINLEVEL: NO PAIN (0)

## 2022-02-24 NOTE — PATIENT INSTRUCTIONS
Thank you for choosing [facility]. You were seen in the [facility] today with Dr. Mello and Dr. Rivas.    Next steps:  1. Return in 1 year with RI and hearing test before the doctors appointment.    2. Thyroid labs today - if they come back normal, you can follow up with your primary doctor    Please don't hesitate to reach out via MyChart or telephone if you have any questions after your visit.      Contact Numbers:    Neurosurgery Clinic: [phone]   ENT Clinic: [phone]     Teresa Stewart MA, RN, PHN, NBC-C  RN Care Coordinator, Skull [facility] Surgery  Direct: [phone] Freda Yap, RN, BSN, PHN, LSN, CPN  RN Care Coordinator, ENT  Direct: [phone]     We are often with patients in clinic or on another phone call. We do our best to check voicemail frequently throughout the day. For urgent matters, please use the general clinic phone numbers listed above. Your call will be routed appropriately.

## 2022-02-24 NOTE — LETTER
"2022      RE: Shauna Galdamez  [address]  [place] [address]         Otolaryngology Clinic      Name: Shauna Galdamez  MRN: [id number]  Age: 46 year old  : 2022      Chief Complaint:   Follow up    History of Present Illness:   Shauna Galdamez is a 46 year old female with a history of left tympanoplasty with cartilage backing, left myringotomy with left t-tube placement (2021) and left vestibular schwannoma who presents for follow up. The patient last followed up with us 4 months ago (10/07/2021) and reported doing well.    Today, the patient reports that she is losing some hair and she is having ongoing fatigue. She reports history of premenstural syndrome. She reports that her sister had a brain tumor on her brainstem and is undergoing proton beam radiotherapy at [facility] now.     Physical Exam:   BP (!) 138/92   Pulse 85   Temp 98.3  F (36.8  C)   Ht 1.626 m (5' 4\")   Wt 70.3 kg (155 lb)   BMI 26.61 kg/m       Female in no acute distress.  Alert and answering questions appropriately.  HB 1/6 bilaterally. Bilateral ears examined under the microscope. Left ear: TM intact, EAC clear and middle ear is well aerated. Right ear: stable perforation that is trying to close. Minor amount of crust on the TM that I removed with a curette and an alligator forceps. I suctioned out some fluid in her ear after removing the crust.    Imaging: independently reviewed and compared to her previous imaging. Stable approximately 1.2cm schwannoma in the left IAC with some extension into the CPA.  MR BRAIN W/O & W CONTRAST 2022:  Impression:    1. No significant change in the presumed left vestibular schwannoma compared to 2021.  2. Minimal leukoaraiosis.      Assessment and Plan:  Shauna Galdamez is a 46 year old female who presents for follow up of her left vestibular schwannoma. She also has bilateral eustachian tube dysfunction. Dr. Rivas and " I recommended obtaining a blood draw and sending a thyroid level to see if this is the cause of her fatigue and hair loss. Her MRI at 6 months is stable, and we are pleased. We'll continue with surveillance with imaging and have her follow up in 1 year with repeat MRI.      Scribe Disclosure:  I, Jerri Edwina, am serving as a scribe to document services personally performed by Brigida Mello MD at this visit, based upon the provider's statements to me. All documentation has been reviewed by the aforementioned provider prior to being entered into the official medical record.     The documentation recorded by the scribe accurately reflects the services I personally performed and the decisions made by me.'      Brigida Mello MD

## 2022-02-24 NOTE — NURSING NOTE
"Chief Complaint   Patient presents with     RECHECK     follow up      Blood pressure (!) 138/92, pulse 85, temperature 98.3  F (36.8  C), height 1.626 m (5' 4\"), weight 70.3 kg (155 lb), not currently breastfeeding.    Richard Montes De Oca LPN    "

## 2022-02-24 NOTE — PROGRESS NOTES
"  Otolaryngology Clinic      Name: Shauna Galdamez  MRN: [id number]  Age: 46 year old  : 2022      Chief Complaint:   Follow up    History of Present Illness:   Shauna Galdamez is a 46 year old female with a history of left tympanoplasty with cartilage backing, left myringotomy with left t-tube placement (2021) and left vestibular schwannoma who presents for follow up. The patient last followed up with us 4 months ago (10/07/2021) and reported doing well.    Today, the patient reports that she is losing some hair and she is having ongoing fatigue. She reports history of premenstural syndrome. She reports that her sister had a brain tumor on her brainstem and is undergoing proton beam radiotherapy at [facility] now.     Physical Exam:   BP (!) 138/92   Pulse 85   Temp 98.3  F (36.8  C)   Ht 1.626 m (5' 4\")   Wt 70.3 kg (155 lb)   BMI 26.61 kg/m       Female in no acute distress.  Alert and answering questions appropriately.  HB 1/6 bilaterally. Bilateral ears examined under the microscope. Left ear: TM intact, EAC clear and middle ear is well aerated. Right ear: stable perforation that is trying to close. Minor amount of crust on the TM that I removed with a curette and an alligator forceps. I suctioned out some fluid in her ear after removing the crust.    Imaging: independently reviewed and compared to her previous imaging. Stable approximately 1.2cm schwannoma in the left IAC with some extension into the CPA.  MR BRAIN W/O & W CONTRAST 2022:  Impression:    1. No significant change in the presumed left vestibular schwannoma compared to 2021.  2. Minimal leukoaraiosis.      Assessment and Plan:  Shauna Galdamez is a 46 year old female who presents for follow up of her left vestibular schwannoma. She also has bilateral eustachian tube dysfunction. Dr. Rivas and I recommended obtaining a blood draw and sending a thyroid level to see if this is the " cause of her fatigue and hair loss. Her MRI at 6 months is stable, and we are pleased. We'll continue with surveillance with imaging and have her follow up in 1 year with repeat MRI.      Scribe Disclosure:  I, Jerri Bland, am serving as a scribe to document services personally performed by Brigida Mello MD at this visit, based upon the provider's statements to me. All documentation has been reviewed by the aforementioned provider prior to being entered into the official medical record.     The documentation recorded by the scribe accurately reflects the services I personally performed and the decisions made by me.'

## 2022-03-08 ENCOUNTER — TELEPHONE (OUTPATIENT)
Dept: OTOLARYNGOLOGY | Facility: CLINIC | Age: 47
End: 2022-03-08
Payer: COMMERCIAL

## 2022-03-08 NOTE — TELEPHONE ENCOUNTER
I tried calling this patient to schedule a [facility] Return appt with Dr. Mello in September.      Appt Note- 6 month ear check

## 2022-03-26 DIAGNOSIS — Z11.59 ENCOUNTER FOR SCREENING FOR OTHER VIRAL DISEASES: Primary | ICD-10-CM

## 2022-04-07 ENCOUNTER — LAB (OUTPATIENT)
Dept: LAB | Facility: CLINIC | Age: 47
End: 2022-04-07
Payer: COMMERCIAL

## 2022-04-07 LAB — TSH SERPL DL<=0.005 MIU/L-ACNC: 2.75 UIU/ML (ref 0.3–5)

## 2022-04-07 PROCEDURE — 84443 ASSAY THYROID STIM HORMONE: CPT

## 2022-04-07 PROCEDURE — 36415 COLL VENOUS BLD VENIPUNCTURE: CPT

## 2022-05-07 ENCOUNTER — HEALTH MAINTENANCE LETTER (OUTPATIENT)
Age: 47
End: 2022-05-07

## 2022-07-02 ENCOUNTER — HEALTH MAINTENANCE LETTER (OUTPATIENT)
Age: 47
End: 2022-07-02

## 2022-07-29 ENCOUNTER — LAB (OUTPATIENT)
Dept: LAB | Facility: CLINIC | Age: 47
End: 2022-07-29
Attending: FAMILY MEDICINE
Payer: COMMERCIAL

## 2022-07-29 ENCOUNTER — OFFICE VISIT (OUTPATIENT)
Dept: FAMILY MEDICINE | Facility: CLINIC | Age: 47
End: 2022-07-29
Attending: FAMILY MEDICINE
Payer: COMMERCIAL

## 2022-07-29 VITALS
HEIGHT: 64 IN | BODY MASS INDEX: 25.85 KG/M2 | HEART RATE: 80 BPM | WEIGHT: 151.4 LBS | DIASTOLIC BLOOD PRESSURE: 97 MMHG | SYSTOLIC BLOOD PRESSURE: 138 MMHG

## 2022-07-29 DIAGNOSIS — R07.89 ATYPICAL CHEST PAIN: ICD-10-CM

## 2022-07-29 DIAGNOSIS — Z13.220 SCREENING FOR LIPID DISORDERS: ICD-10-CM

## 2022-07-29 DIAGNOSIS — E83.51 HYPOCALCEMIA: Primary | ICD-10-CM

## 2022-07-29 DIAGNOSIS — E83.51 HYPOCALCEMIA: ICD-10-CM

## 2022-07-29 DIAGNOSIS — R03.0 ELEVATED BP WITHOUT DIAGNOSIS OF HYPERTENSION: ICD-10-CM

## 2022-07-29 DIAGNOSIS — Z82.49 FAMILY HISTORY OF HEART DISEASE: ICD-10-CM

## 2022-07-29 PROBLEM — L91.0 KELOID SCAR: Status: RESOLVED | Noted: 2017-02-06 | Resolved: 2022-07-29

## 2022-07-29 PROBLEM — L81.4 SOLAR LENTIGINOSIS: Status: RESOLVED | Noted: 2017-02-06 | Resolved: 2022-07-29

## 2022-07-29 PROBLEM — D18.01 CHERRY ANGIOMA: Status: RESOLVED | Noted: 2017-02-06 | Resolved: 2022-07-29

## 2022-07-29 PROBLEM — D22.9: Status: RESOLVED | Noted: 2017-02-06 | Resolved: 2022-07-29

## 2022-07-29 PROBLEM — D22.9 MULTIPLE BENIGN NEVI: Status: RESOLVED | Noted: 2017-02-06 | Resolved: 2022-07-29

## 2022-07-29 LAB
ANION GAP SERPL CALCULATED.3IONS-SCNC: 9 MMOL/L (ref 3–14)
BUN SERPL-MCNC: 11 MG/DL (ref 7–30)
CA-I BLD-MCNC: 4.8 MG/DL (ref 4.4–5.2)
CALCIUM SERPL-MCNC: 9.1 MG/DL (ref 8.5–10.1)
CHLORIDE BLD-SCNC: 103 MMOL/L (ref 94–109)
CHOLEST SERPL-MCNC: 188 MG/DL
CO2 SERPL-SCNC: 26 MMOL/L (ref 20–32)
CREAT SERPL-MCNC: 0.64 MG/DL (ref 0.52–1.04)
FASTING STATUS PATIENT QL REPORTED: NO
GFR SERPL CREATININE-BSD FRML MDRD: >90 ML/MIN/1.73M2
GLUCOSE BLD-MCNC: 95 MG/DL (ref 70–99)
HDLC SERPL-MCNC: 72 MG/DL
LDLC SERPL CALC-MCNC: 88 MG/DL
MAGNESIUM SERPL-MCNC: 2.2 MG/DL (ref 1.6–2.3)
NONHDLC SERPL-MCNC: 116 MG/DL
POTASSIUM BLD-SCNC: 3.9 MMOL/L (ref 3.4–5.3)
SODIUM SERPL-SCNC: 138 MMOL/L (ref 133–144)
TRIGL SERPL-MCNC: 139 MG/DL

## 2022-07-29 PROCEDURE — 83735 ASSAY OF MAGNESIUM: CPT

## 2022-07-29 PROCEDURE — 80061 LIPID PANEL: CPT

## 2022-07-29 PROCEDURE — G0463 HOSPITAL OUTPT CLINIC VISIT: HCPCS

## 2022-07-29 PROCEDURE — 99214 OFFICE O/P EST MOD 30 MIN: CPT | Performed by: FAMILY MEDICINE

## 2022-07-29 PROCEDURE — 80048 BASIC METABOLIC PNL TOTAL CA: CPT

## 2022-07-29 PROCEDURE — 82330 ASSAY OF CALCIUM: CPT

## 2022-07-29 PROCEDURE — 36415 COLL VENOUS BLD VENIPUNCTURE: CPT

## 2022-07-29 NOTE — PROGRESS NOTES
CC: Follow Up (ED follow up )        ASSESSMENT/PLAN:   Shauna was seen today for follow up.    Hypocalcemia  Hypocalcemia causing carpopedal spasm, likely triggered by plasma donation. She is not interested in any further plasma donation. Symptoms have improved. Will recheck electrolytes today.   -     Basic metabolic panel; Future  -     Ionized Calcium; Future  -     Magnesium; Future    Atypical chest pain  Family history of heart disease  Screening for lipid disorders  Due to atypical chest pain and FH of heart disease (grandmother in her late 30s, and multiple other family members on maternal side) recommend starting with CT calcium score. If score elevated, consider stress test.   -     Lipid Panel; Future  -     Magnesium; Future  -     CT Coronary Calcium Scan; Future    Elevated BP without diagnosis of hypertension  Encouraged Shauna to obtain a home BP cuff and check BP at home a few times per week for the next few weeks. Goal /80.       Worrisome signs and symptoms were discussed with Shauna and she was instructed to return to the clinic for concerning symptoms or to call with questions. Return if symptoms worsen or fail to improve.    Brigida Aldrich MD  Family Medicine        SUBJECTIVE: Shauna Galdamez is a 46 year old female who comes in for ED follow-up.     She was seen in the ED on 7/19/2022 after donating plasma. Did 4 sessions of plasma donation with [facility], a few days apart for each session, but no more than 2 sessions in a week. Had a plasma donation, and then later that afternoon, felt nauseated, threw up, noted tingling in finger, lightheaded. Then her hands got numb and her hands spasmed. Over time in the ED, her hands uncramped. She was diagnosed with hypocalcemia (calcium 8.3, ionized calcium 1.07). She was given IV calcium and a liter of normal saline. Symptoms resolved and she was discharged home.     Since then, Shauna reports her hands feel back to normal, but  "she has noticed some discomfort/gas pain goes from mid chest and radiates to the back between shoulder blades. Happens several times per day. Not linked to anything like exercise, food, or time of day. Lasts for about 10 minutes. Has not tried any treatment. Feels like gas pains. Radiates to bilateral shoulders. She does take Aleve 1-2 times per day when stressed bc she will clench her jaw, and has had a lot of family stress over the last week. She mentioned this to her therapist, and Shauna was ready to write off her symptoms as stress-related, but her therapist encouraged her to r/o any cardiac cause of symptoms.     In the ED, troponin was negative. An EKG was done which showed sinus rhythm with a prolonged QTc.     ROS - No sob. No LE edema, PND, orthopnea. No palpitations. No abd pain, nausea, or vomiting. Has been occasionally dizzy & lightheaded since the ED visit.      PMH - No HLD, HTN, or diabetes. BP is high today - Shauna does not have a history of elevated BP. Her BP in the ED was 98/69.     SH - Nonsmoker.     FH - Maternal grandmother had a heart attack at age 38. Mom has not had heart issues, but was recently dx w/ HTN.      OBJECTIVE:   BP (!) 138/97   Pulse 80   Ht 1.626 m (5' 4\")   Wt 68.7 kg (151 lb 6.4 oz)   BMI 25.99 kg/m    General: Alert and oriented in no acute distress.  Skin: Clear without lesions or rashes.   Neuro: Grossly intact, nonfocal. Equal  strength in bilateral hands. Full & symmetric ROM in b/l UE and LE.   Cardio: RRR, normal S1 and S2, without murmurs, rubs, or gallops appreciated.    Resp: CTA bilaterally. Normal respiratory effort.   GI: Soft, normal bowel sounds, NT, ND.  No masses or hepatosplenomegaly appreciated.    MSK: Distal pulses 2+ and symmetric, extremities without deformity, edema.  Psych: Mood and affect appropriate.  "

## 2022-09-07 DIAGNOSIS — N95.1 PERIMENOPAUSAL VASOMOTOR SYMPTOMS: ICD-10-CM

## 2022-09-08 RX ORDER — DESOGESTREL AND ETHINYL ESTRADIOL 21-5 (28)
1 KIT ORAL DAILY
Qty: 84 TABLET | Refills: 3 | Status: SHIPPED | OUTPATIENT
Start: 2022-09-08 | End: 2023-08-17

## 2022-10-20 ASSESSMENT — ANXIETY QUESTIONNAIRES
6. BECOMING EASILY ANNOYED OR IRRITABLE: NOT AT ALL
GAD7 TOTAL SCORE: 2
1. FEELING NERVOUS, ANXIOUS, OR ON EDGE: NOT AT ALL
GAD7 TOTAL SCORE: 2
IF YOU CHECKED OFF ANY PROBLEMS ON THIS QUESTIONNAIRE, HOW DIFFICULT HAVE THESE PROBLEMS MADE IT FOR YOU TO DO YOUR WORK, TAKE CARE OF THINGS AT HOME, OR GET ALONG WITH OTHER PEOPLE: NOT DIFFICULT AT ALL
7. FEELING AFRAID AS IF SOMETHING AWFUL MIGHT HAPPEN: NOT AT ALL
GAD7 TOTAL SCORE: 2
7. FEELING AFRAID AS IF SOMETHING AWFUL MIGHT HAPPEN: NOT AT ALL
8. IF YOU CHECKED OFF ANY PROBLEMS, HOW DIFFICULT HAVE THESE MADE IT FOR YOU TO DO YOUR WORK, TAKE CARE OF THINGS AT HOME, OR GET ALONG WITH OTHER PEOPLE?: NOT DIFFICULT AT ALL
4. TROUBLE RELAXING: SEVERAL DAYS
2. NOT BEING ABLE TO STOP OR CONTROL WORRYING: NOT AT ALL
5. BEING SO RESTLESS THAT IT IS HARD TO SIT STILL: SEVERAL DAYS
3. WORRYING TOO MUCH ABOUT DIFFERENT THINGS: NOT AT ALL

## 2022-10-20 ASSESSMENT — PATIENT HEALTH QUESTIONNAIRE - PHQ9
10. IF YOU CHECKED OFF ANY PROBLEMS, HOW DIFFICULT HAVE THESE PROBLEMS MADE IT FOR YOU TO DO YOUR WORK, TAKE CARE OF THINGS AT HOME, OR GET ALONG WITH OTHER PEOPLE: NOT DIFFICULT AT ALL
SUM OF ALL RESPONSES TO PHQ QUESTIONS 1-9: 4
SUM OF ALL RESPONSES TO PHQ QUESTIONS 1-9: 4

## 2022-10-21 ENCOUNTER — VIRTUAL VISIT (OUTPATIENT)
Dept: FAMILY MEDICINE | Facility: CLINIC | Age: 47
End: 2022-10-21
Payer: COMMERCIAL

## 2022-10-21 DIAGNOSIS — F41.9 ANXIETY: ICD-10-CM

## 2022-10-21 DIAGNOSIS — F33.9 EPISODE OF RECURRENT MAJOR DEPRESSIVE DISORDER, UNSPECIFIED DEPRESSION EPISODE SEVERITY (H): Primary | ICD-10-CM

## 2022-10-21 PROCEDURE — 99214 OFFICE O/P EST MOD 30 MIN: CPT | Mod: 95 | Performed by: PHYSICIAN ASSISTANT

## 2022-10-21 RX ORDER — FLUOXETINE 40 MG/1
40 CAPSULE ORAL DAILY
Qty: 90 CAPSULE | Refills: 3 | Status: SHIPPED | OUTPATIENT
Start: 2022-10-21 | End: 2024-10-03

## 2022-10-21 ASSESSMENT — PATIENT HEALTH QUESTIONNAIRE - PHQ9
SUM OF ALL RESPONSES TO PHQ QUESTIONS 1-9: 4
10. IF YOU CHECKED OFF ANY PROBLEMS, HOW DIFFICULT HAVE THESE PROBLEMS MADE IT FOR YOU TO DO YOUR WORK, TAKE CARE OF THINGS AT HOME, OR GET ALONG WITH OTHER PEOPLE: NOT DIFFICULT AT ALL

## 2022-10-21 ASSESSMENT — ANXIETY QUESTIONNAIRES: GAD7 TOTAL SCORE: 2

## 2022-10-21 NOTE — LETTER
My Depression Action Plan  Name: Shauna Galdamez   Date of Birth 1975  Date: 10/21/2022    My doctor: Brigida Aldrich   My clinic: [address] [address] [phone]          GREEN    ZONE   Good Control    What it looks like:     Things are going generally well. You have normal ups and downs. You may even feel depressed from time to time, but bad moods usually last less than a day.   What you need to do:  1. Continue to care for yourself (see self care plan)  2. Check your depression survival kit and update it as needed  3. Follow your physician s recommendations including any medication.  4. Do not stop taking medication unless you consult with your physician first.           YELLOW         ZONE Getting Worse    What it looks like:     Depression is starting to interfere with your life.     It may be hard to get out of bed; you may be starting to isolate yourself from others.    Symptoms of depression are starting to last most all day and this has happened for several days.     You may have suicidal thoughts but they are not constant.   What you need to do:     1. Call your care team. Your response to treatment will improve if you keep your care team informed of your progress. Yellow periods are signs an adjustment may need to be made.     2. Continue your self-care.  Just get dressed and ready for the day.  Don't give yourself time to talk yourself out of it.    3. Talk to someone in your support network.    4. Open up your Depression Self-Care Plan/Wellness Kit.           RED    ZONE Medical Alert - Get Help    What it looks like:     Depression is seriously interfering with your life.     You may experience these or other symptoms: You can t get out of bed most days, can t work or engage in other necessary activities, you have trouble taking care of basic hygiene, or basic responsibilities, thoughts of suicide or death  that will not go away, self-injurious behavior.     What you need to do:  1. Call your care team and request a same-day appointment. If they are not available (weekends or after hours) call your local crisis line, emergency room or 911.          Depression Self-Care Plan / Wellness Kit    Many people find that medication and therapy are helpful treatments for managing depression. In addition, making small changes to your everyday life can help to boost your mood and improve your wellbeing. Below are some tips for you to consider. Be sure to talk with your medical provider and/or behavioral health consultant if your symptoms are worsening or not improving.     Sleep   Sleep hygiene  means all of the habits that support good, restful sleep. It includes maintaining a consistent bedtime and wake time, using your bedroom only for sleeping or sex, and keeping the bedroom dark and free of distractions like a computer, smartphone, or television.     Develop a Healthy Routine  Maintain good hygiene. Get out of bed in the morning, make your bed, brush your teeth, take a shower, and get dressed. Don t spend too much time viewing media that makes you feel stressed. Find time to relax each day.    Exercise  Get some form of exercise every day. This will help reduce pain and release endorphins, the  feel good  chemicals in your brain. It can be as simple as just going for a walk or doing some gardening, anything that will get you moving.      Diet  Strive to eat healthy foods, including fruits and vegetables. Drink plenty of water. Avoid excessive sugar, caffeine, alcohol, and other mood-altering substances.     Stay Connected with Others  Stay in touch with friends and family members.    Manage Your Mood  Try deep breathing, massage therapy, biofeedback, or meditation. Take part in fun activities when you can. Try to find something to smile about each day.     Psychotherapy  Be open to working with a therapist if your provider  recommends it.     Medication  Be sure to take your medication as prescribed. Most anti-depressants need to be taken every day. It usually takes several weeks for medications to work. Not all medicines work for all people. It is important to follow-up with your provider to make sure you have a treatment plan that is working for you. Do not stop your medication abruptly without first discussing it with your provider.    Crisis Resources   These hotlines are for both adults and children. They and are open 24 hours a day, 7 days a week unless noted otherwise.      National Suicide Prevention Lifeline   988 or 4-406-663-BOVR (5529)      Crisis Text Line    www.crisistextline.org  Text HOME to 363105 from anywhere in the [place], anytime, about any type of crisis. A live, trained crisis counselor will receive the text and respond quickly.      [facility] Lifeline for LGBTQ Youth  A national crisis intervention and suicide lifeline for LGBTQ youth under 25. Provides a safe place to talk without judgement. Call 1-230.501.5224; text START to 731650 or visit www.thetrevorproject.org to talk to a trained counselor.      For [facility] crisis numbers, visit the [facility] website at:  https://[place].gov/dhs/people-we-serve/adults/health-care/mental-health/resources/crisis-contacts.jsp

## 2022-10-21 NOTE — PROGRESS NOTES
Shauna is a 47 year old who is being evaluated via a billable video visit.      How would you like to obtain your AVS? MyChart  If the video visit is dropped, the invitation should be resent by: Text to cell phone: [phone]  Will anyone else be joining your video visit? No          Assessment & Plan     Episode of recurrent major depressive disorder, unspecified depression episode severity (H)    Well controlled. Refilled.    - FLUoxetine (PROZAC) 40 MG capsule; Take 1 capsule (40 mg) by mouth daily      Anxiety    Well controlled. Refilled.    - FLUoxetine (PROZAC) 40 MG capsule; Take 1 capsule (40 mg) by mouth daily                   No follow-ups on file.    Morro Garrido PA-C  [facility]   Shauna is a 47 year old presenting for the following health issues:  Depression      History of Present Illness       Mental Health Follow-up:  Patient presents to follow-up on Depression & Anxiety.Patient's depression since last visit has been:  Medium  The patient is not having other symptoms associated with depression.  Patient's anxiety since last visit has been:  Better  The patient is not having other symptoms associated with anxiety.  Any significant life events: other  Patient is not feeling anxious or having panic attacks.  Patient has no concerns about alcohol or drug use.    She eats 4 or more servings of fruits and vegetables daily.She consumes 1 sweetened beverage(s) daily.She exercises with enough effort to increase her heart rate 30 to 60 minutes per day.  She exercises with enough effort to increase her heart rate 3 or less days per week.   She is taking medications regularly.    Today's PHQ-9         PHQ-9 Total Score: 4    PHQ-9 Q9 Thoughts of better off dead/self-harm past 2 weeks :   Not at all    How difficult have these problems made it for you to do your work, take care of things at home, or get along with other people: Not difficult at all  Today's  "SUKHI-7 Score: 2         Review of Systems   Constitutional, HEENT, cardiovascular, pulmonary, gi and gu systems are negative, except as otherwise noted.      Objective    Vitals - Patient Reported  Weight (Patient Reported): 68 kg (150 lb)  Height (Patient Reported): 162.6 cm (5' 4\")  BMI (Based on Pt Reported Ht/Wt): 25.75      Vitals:  No vitals were obtained today due to virtual visit.    Physical Exam   GENERAL: Healthy, alert and no distress  EYES: Eyes grossly normal to inspection.  No discharge or erythema, or obvious scleral/conjunctival abnormalities.  HENT: Normal cephalic/atraumatic.  External ears, nose and mouth without ulcers or lesions.  No nasal drainage visible.  NECK: No asymmetry, visible masses or scars  RESP: No audible wheeze, cough, or visible cyanosis.  No visible retractions or increased work of breathing.    SKIN: Visible skin clear. No significant rash, abnormal pigmentation or lesions.  NEURO: Cranial nerves grossly intact.  Mentation and speech appropriate for age.  PSYCH: Mentation appears normal, affect normal/bright, judgement and insight intact, normal speech and appearance well-groomed.                Video-Visit Details    Video Start Time: 10:19 AM    Type of service:  Video Visit    Video End Time:10:24 AM    Originating Location (pt. Location): Home        Distant Location (provider location):  Off-site    Platform used for Video Visit: Ghanshyam"

## 2022-11-08 ENCOUNTER — VIRTUAL VISIT (OUTPATIENT)
Dept: ORTHOPEDICS | Facility: CLINIC | Age: 47
End: 2022-11-08
Payer: COMMERCIAL

## 2022-11-08 DIAGNOSIS — M25.571 PAIN IN JOINT, ANKLE AND FOOT, RIGHT: Primary | ICD-10-CM

## 2022-11-08 PROCEDURE — 99212 OFFICE O/P EST SF 10 MIN: CPT | Mod: 95 | Performed by: ORTHOPAEDIC SURGERY

## 2022-11-08 NOTE — PROGRESS NOTES
CHIEF COMPLAINT:  Right second toe mucoid cyst.    HISTORY OF PRESENT ILLNESS:  Mrs. Galdamez was interviewed via phone secondary to the pandemic.  The patient authorized the encounter.    The patient is scheduled to undergo surgery 12/14/2022.  She has reported on today's interview that she has not had any recurrence or presence of the cyst since 05/2022.    PLAN:  I advised the patient to cancel surgery, and to continue monitoring the toe.    The patient was in agreement.  All questions were answered.  She has no restrictions.  She will follow up on a p.r.n. basis.      17 minutes were spent on the phone with this patient discussing her foot and ankle pathology.    TT:  20 minutes.  CT:  15 minutes.

## 2022-11-08 NOTE — LETTER
11/8/2022         RE: Shauna Galdamez  [address]  [place] [address]        Dear Colleague,    Thank you for referring your patient, Shauna Galdamez, to the [facility] [place]. Please see a copy of my visit note below.    CHIEF COMPLAINT:  Right second toe mucoid cyst.    HISTORY OF PRESENT ILLNESS:  Mrs. Galdamez was interviewed via phone secondary to the pandemic.  The patient authorized the encounter.    The patient is scheduled to undergo surgery 12/14/2022.  She has reported on today's interview that she has not had any recurrence or presence of the cyst since 05/2022.    PLAN:  I advised the patient to cancel surgery, and to continue monitoring the toe.    The patient was in agreement.  All questions were answered.  She has no restrictions.  She will follow up on a p.r.n. basis.      TT:  20 minutes.  CT:  15 minutes.        Yefri Helton MD

## 2022-11-09 ENCOUNTER — ANCILLARY PROCEDURE (OUTPATIENT)
Dept: MAMMOGRAPHY | Facility: CLINIC | Age: 47
End: 2022-11-09
Attending: FAMILY MEDICINE
Payer: COMMERCIAL

## 2022-11-09 DIAGNOSIS — Z12.31 VISIT FOR SCREENING MAMMOGRAM: ICD-10-CM

## 2022-11-09 PROCEDURE — 77067 SCR MAMMO BI INCL CAD: CPT | Mod: GC | Performed by: RADIOLOGY

## 2022-11-09 PROCEDURE — 77063 BREAST TOMOSYNTHESIS BI: CPT | Mod: GC | Performed by: RADIOLOGY

## 2022-11-28 NOTE — TELEPHONE ENCOUNTER
FUTURE VISIT INFORMATION      FUTURE VISIT INFORMATION:    Date: 10-30-18    Time:     Location:   REFERRAL INFORMATION:    Referring provider:  Dr Ramos    Referring providers clinic:      Reason for visit/diagnosis  PE tube     RECORDS REQUESTED FROM:       Clinic name Comments Records Status Imaging Status   All records internal                                       RECORDS STATUS        
[phone]

## 2022-12-30 NOTE — PATIENT INSTRUCTIONS
"Trouble sleeping:  Medication options:  -hydroxyzine as needed (antihistamine)  -trazodone  -melatonin  Let us know if you want a prescription to help with sleep.    Tendinitis  You likely had tennis elbow, or tendinitis. Go to the links below for exercises to treat and prevent tendinitis. Goal is to strengthen your forearm muscles, wrists and biceps.    If it happens again:  -avoid activities that cause pain  -ice, ibuprofen as needed  -tennis elbow brace or band (you can buy this on [employer], or [employer]); I attached some pictures below    Go to this website below for exercises. You can also [employer] \"tennis elbow exercises\"    [email or web address]                              Preventive Health Recommendations  Female Ages 40 to 49    Yearly exam:   See your health care provider every year in order to  Review health changes.   Discuss preventive care.    Review your medicines if your doctor prescribed any.    Get a Pap test every three years (unless you have an abnormal result and your provider advises testing more often).    If you get Pap tests with HPV test, you only need to test every 5 years, unless you have an abnormal result. You do not need a Pap test if your uterus was removed (hysterectomy) and you have not had cancer.    You should be tested each year for STDs (sexually transmitted diseases), if you're at risk.   Ask your doctor if you should have a mammogram.    Have a colonoscopy (test for colon cancer) if someone in your family has had colon cancer or polyps before age 50.     Have a cholesterol test every 5 years.     Have a diabetes test (fasting glucose) after age 45. If you are at risk for diabetes, you should have this test every 3 years.    Shots: Get a flu shot each year. Get a tetanus shot every 10 years.     Nutrition:   Eat at least 5 servings of fruits and vegetables each day.  Eat whole-grain bread, " whole-wheat pasta and brown rice instead of white grains and rice.  Get adequate Calcium and Vitamin D.      Lifestyle  Exercise at least 150 minutes a week (an average of 30 minutes a day, 5 days a week). This will help you control your weight and prevent disease.  Limit alcohol to one drink per day.  No smoking.   Wear sunscreen to prevent skin cancer.  See your dentist every six months for an exam and cleaning.

## 2023-01-02 ENCOUNTER — OFFICE VISIT (OUTPATIENT)
Dept: FAMILY MEDICINE | Facility: CLINIC | Age: 48
End: 2023-01-02
Payer: COMMERCIAL

## 2023-01-02 VITALS
HEART RATE: 82 BPM | TEMPERATURE: 97.9 F | SYSTOLIC BLOOD PRESSURE: 120 MMHG | DIASTOLIC BLOOD PRESSURE: 72 MMHG | RESPIRATION RATE: 20 BRPM | OXYGEN SATURATION: 100 % | BODY MASS INDEX: 24.86 KG/M2 | WEIGHT: 149.2 LBS | HEIGHT: 65 IN

## 2023-01-02 DIAGNOSIS — Z12.11 SCREEN FOR COLON CANCER: ICD-10-CM

## 2023-01-02 DIAGNOSIS — M77.8 TENDINITIS OF FOREARM: ICD-10-CM

## 2023-01-02 DIAGNOSIS — Z00.00 ROUTINE GENERAL MEDICAL EXAMINATION AT A HEALTH CARE FACILITY: Primary | ICD-10-CM

## 2023-01-02 DIAGNOSIS — Z00.00 ROUTINE GENERAL MEDICAL EXAMINATION AT A HEALTH CARE FACILITY: ICD-10-CM

## 2023-01-02 PROCEDURE — 99213 OFFICE O/P EST LOW 20 MIN: CPT | Mod: 25 | Performed by: STUDENT IN AN ORGANIZED HEALTH CARE EDUCATION/TRAINING PROGRAM

## 2023-01-02 PROCEDURE — 99396 PREV VISIT EST AGE 40-64: CPT | Performed by: STUDENT IN AN ORGANIZED HEALTH CARE EDUCATION/TRAINING PROGRAM

## 2023-01-02 ASSESSMENT — ENCOUNTER SYMPTOMS
EYE PAIN: 0
ARTHRALGIAS: 1
MYALGIAS: 0
BREAST MASS: 0
NAUSEA: 0
PARESTHESIAS: 0
HEARTBURN: 0
HEADACHES: 0
CHILLS: 0
JOINT SWELLING: 0
SHORTNESS OF BREATH: 0
DIZZINESS: 0
DYSURIA: 0
ABDOMINAL PAIN: 0
FEVER: 0
FREQUENCY: 0
COUGH: 0
PALPITATIONS: 0
CONSTIPATION: 0
DIARRHEA: 0
WEAKNESS: 0
HEMATURIA: 0
HEMATOCHEZIA: 0
SORE THROAT: 0
NERVOUS/ANXIOUS: 0

## 2023-01-02 ASSESSMENT — ANXIETY QUESTIONNAIRES
7. FEELING AFRAID AS IF SOMETHING AWFUL MIGHT HAPPEN: NOT AT ALL
8. IF YOU CHECKED OFF ANY PROBLEMS, HOW DIFFICULT HAVE THESE MADE IT FOR YOU TO DO YOUR WORK, TAKE CARE OF THINGS AT HOME, OR GET ALONG WITH OTHER PEOPLE?: NOT DIFFICULT AT ALL
4. TROUBLE RELAXING: NOT AT ALL
IF YOU CHECKED OFF ANY PROBLEMS ON THIS QUESTIONNAIRE, HOW DIFFICULT HAVE THESE PROBLEMS MADE IT FOR YOU TO DO YOUR WORK, TAKE CARE OF THINGS AT HOME, OR GET ALONG WITH OTHER PEOPLE: NOT DIFFICULT AT ALL
3. WORRYING TOO MUCH ABOUT DIFFERENT THINGS: NOT AT ALL
6. BECOMING EASILY ANNOYED OR IRRITABLE: NOT AT ALL
GAD7 TOTAL SCORE: 0
7. FEELING AFRAID AS IF SOMETHING AWFUL MIGHT HAPPEN: NOT AT ALL
5. BEING SO RESTLESS THAT IT IS HARD TO SIT STILL: NOT AT ALL
2. NOT BEING ABLE TO STOP OR CONTROL WORRYING: NOT AT ALL
1. FEELING NERVOUS, ANXIOUS, OR ON EDGE: NOT AT ALL
GAD7 TOTAL SCORE: 0

## 2023-01-02 NOTE — PROGRESS NOTES
SUBJECTIVE:   CC: Shauna is an 47 year old who presents for preventive health visit.     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  Diet-healthy, varied  Exercise-[employer]    Pap/hpv-due 4/2/26  Mammo-up to date    Mood-prozac 40mg-stable    Today's PHQ-2 Score:   PHQ-2 ( 1999 Pfizer) 1/2/2023   Q1: Little interest or pleasure in doing things 0   Q2: Feeling down, depressed or hopeless 0   PHQ-2 Score 0   PHQ-2 Total Score (12-17 Years)- Positive if 3 or more points; Administer PHQ-A if positive -   Q1: Little interest or pleasure in doing things Not at all   Q2: Feeling down, depressed or hopeless Not at all   PHQ-2 Score 0       PHQ 6/29/2018 10/5/2018 10/20/2022   PHQ-9 Total Score 1 3 4   Q9: Thoughts of better off dead/self-harm past 2 weeks Not at all Not at all Not at all     SUKHI-7 SCORE 10/5/2018 10/20/2022 1/2/2023   Total Score - 2 (minimal anxiety) 0 (minimal anxiety)   Total Score 2 2 0       Have you ever done Advance Care Planning? (For example, a Health Directive, POLST, or a discussion with a medical provider or your loved ones about your wishes): No, advance care planning information given to patient to review.  Patient plans to discuss their wishes with loved ones or provider.      Social History     Tobacco Use     Smoking status: Never     Smokeless tobacco: Never   Substance Use Topics     Alcohol use: Yes     Comment: glass of wine/night     If you  drink alcohol do you typically have >3 drinks per day or >7 drinks per week? No      Reviewed orders with patient.  Reviewed health maintenance and updated orders accordingly - Yes      Breast Cancer Screening:    Breast CA Risk Assessment (FHS-7) 1/2/2023   Do you have a family history of breast, colon, or ovarian cancer? No / Unknown       Pertinent mammograms are reviewed under the imaging tab.    History of abnormal Pap smear: NO - age 30-65 PAP every 5 years with negative HPV co-testing recommended  PAP / HPV Latest Ref Rng & Units 4/2/2021   PAP (Historical) - NIL   HPV16 NEG:Negative Negative   HPV18 NEG:Negative Negative   HRHPV NEG:Negative Negative     Reviewed and updated as needed this visit by clinical staff   Tobacco  Allergies  Meds              Reviewed and updated as needed this visit by Provider                   Review of Systems   Constitutional: Negative for chills and fever.   HENT: Negative for congestion, ear pain, hearing loss and sore throat.    Eyes: Negative for pain and visual disturbance.   Respiratory: Negative for cough and shortness of breath.    Cardiovascular: Negative for chest pain, palpitations and peripheral edema.   Gastrointestinal: Negative for abdominal pain, constipation, diarrhea, heartburn, hematochezia and nausea.   Breasts:  Negative for tenderness, breast mass and discharge.   Genitourinary: Positive for vaginal bleeding. Negative for dysuria, frequency, genital sores, hematuria, pelvic pain, urgency and vaginal discharge.   Musculoskeletal: Positive for arthralgias. Negative for joint swelling and myalgias.   Skin: Negative for rash.   Neurological: Negative for dizziness, weakness, headaches and paresthesias.   Psychiatric/Behavioral: Negative for mood changes. The patient is not nervous/anxious.         OBJECTIVE:   /72 (BP Location: Right arm, Patient Position: Sitting, Cuff Size: Adult Regular)   Pulse 82   Temp 97.9  F (36.6  C) (Temporal)   Resp 20   " Ht 1.64 m (5' 4.57\")   Wt 67.7 kg (149 lb 3.2 oz)   SpO2 100%   BMI 25.16 kg/m       Physical Exam  Constitutional:       General: She is not in acute distress.     Appearance: She is well-developed.   HENT:      Head: Normocephalic and atraumatic.      Right Ear: Tympanic membrane, ear canal and external ear normal.      Left Ear: Tympanic membrane, ear canal and external ear normal.      Nose: Nose normal.      Mouth/Throat:      Pharynx: No oropharyngeal exudate.   Eyes:      Extraocular Movements: Extraocular movements intact.      Conjunctiva/sclera: Conjunctivae normal.      Pupils: Pupils are equal, round, and reactive to light.      Comments: Ear tube present in L ear   Cardiovascular:      Rate and Rhythm: Normal rate and regular rhythm.      Heart sounds: Normal heart sounds. No murmur heard.  Pulmonary:      Effort: Pulmonary effort is normal.      Breath sounds: No wheezing or rales.   Abdominal:      General: Bowel sounds are normal.      Palpations: Abdomen is soft.      Tenderness: There is no abdominal tenderness.   Musculoskeletal:      Cervical back: Normal range of motion and neck supple. No rigidity.   Lymphadenopathy:      Cervical: No cervical adenopathy.   Skin:     General: Skin is warm and dry.      Findings: No rash.   Neurological:      General: No focal deficit present.      Mental Status: She is alert and oriented to person, place, and time.   Psychiatric:         Mood and Affect: Mood normal.         Thought Content: Thought content normal.          ASSESSMENT/PLAN:   Shauna was seen today for physical.    Routine general medical examination at a health care facility  Screen for colon cancer  Practicing a healthy lifestyle. Normal vitals/exam. Pap due 2026. Mammogram due 11/9/2023. Cologuard ordered for colon cancer screening. Will defer lipids, hep c, hiv today. Up to date on vaccines. Follow up in 1 year.    Forearm tendinitis  Noticed forearm pain after exercising at [employer]/doing weight lifting. Resolved with rest. Suspect tennis elbow. See AVS for instructions for stretching/exercises. PT ordered if patient needs it in the future/symptoms recur.       COUNSELING:  Reviewed preventive health counseling, as reflected in patient instructions       Regular exercise       Healthy diet/nutrition       Vision screening       Colorectal Cancer Screening      She reports that she has never smoked. She has never used smokeless tobacco.      Roel Campoverde DO  [facility]  Answers for HPI/ROS submitted by the patient on 1/2/2023  SUKHI 7 TOTAL SCORE: 0

## 2023-01-09 NOTE — PROGRESS NOTES
[facility] Rehabilitation Services Initial Evaluation    Subjective:  Shauna Galdamez is a 47 year old female with a right forearm condition. Mechanism of injury: overuse, weight lifting. Where: (home, work, MVA, community, recreation/sport, unknown, other): community. Onset of symptoms: 11/18/22, PT order date: 1/2/23. Location of symptoms: lateral elbow and forearm. Pain level on number scale: 1-7/10. Quality of pain: ache, sharp. Associated symptoms: denies numbness and tingling. Pain frequency (constant/intermittent): intermittent. Symptoms are exacerbated by: lifting, gripping, vacuuming, carrying . Symptoms are relieved by: rest, compression. Progression of symptoms since onset (same/better/worse): better, but not 100%. Special tests (x-ray, MRI, CT scan, EMG, bone scan): x-ray. Previous treatment: None. Improvement with previous treatment: NA. General health as reported by patient is good. Pertinent medical history includes:  see Epic. Medical allergies includes: see Epic. Surgical history includes: see Epic. Current medications include: see Epic. 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 Patient is (working in normal job without restrictions/working in normal job with restrictions/working in an alternate job/not working due to present treatment problem): working in normal job. Primary job tasks: computer work, driving. Barriers at home/work: None reported by patient. Red flags: None reported by patient.    Objective  Cervical AROM WNL except right rotation, left SB and EXT min loss  Shoulder AROM WNL and no pain    Posture    Forward Head +   Rounded Shoulders    Scapular Winging      Bilateral elbow AROM WNL    Right elbow strength WNL except wrist extension = 4/5 and no pain    Special Tests Right Left   Lateral epicondylitis -    Medial epicondylitis -    Tinel s     Pronator Teres     Valgus Ext     Phalen s       Assessment/Plan:    Patient is a 47 year old female with right side elbow complaints.     Patient has the following significant findings with corresponding treatment plan.                Diagnosis 1:  Right elbow pain  Pain -  manual therapy, STS, splint/taping/bracing/orthotics and self management  Decreased ROM/flexibility - manual therapy and therapeutic exercise  Decreased strength - therapeutic exercise and therapeutic activities  Decreased function - therapeutic activities  Impaired posture - neuro re-education    Previous and current functional limitations:  (See Goal Flow Sheet for this information)    Short term and Long term goals: (See Goal Flow Sheet for this information)     Communication ability:  Patient appears to be able to clearly communicate and understand verbal and written communication and follow directions correctly.  Treatment Explanation - The following has been discussed with the patient:   RX ordered/plan of care  Anticipated outcomes  Possible risks and side effects  This patient would benefit from PT intervention to resume normal activities.   Rehab potential is good.    Frequency:  1 X week, once daily  Duration:  for 8 weeks  Discharge Plan:  Achieve all LTG.  Independent in home treatment program.  Reach maximal therapeutic benefit.    Please refer to the daily flowsheet for treatment today, total treatment time and time spent performing 1:1 timed codes.

## 2023-01-10 ENCOUNTER — THERAPY VISIT (OUTPATIENT)
Dept: PHYSICAL THERAPY | Facility: CLINIC | Age: 48
End: 2023-01-10
Attending: STUDENT IN AN ORGANIZED HEALTH CARE EDUCATION/TRAINING PROGRAM
Payer: COMMERCIAL

## 2023-01-10 DIAGNOSIS — M77.8 TENDINITIS OF FOREARM: ICD-10-CM

## 2023-01-10 DIAGNOSIS — M25.521 RIGHT ELBOW PAIN: ICD-10-CM

## 2023-01-10 PROCEDURE — 97110 THERAPEUTIC EXERCISES: CPT | Mod: GP | Performed by: PHYSICAL THERAPIST

## 2023-01-10 PROCEDURE — 97161 PT EVAL LOW COMPLEX 20 MIN: CPT | Mod: GP | Performed by: PHYSICAL THERAPIST

## 2023-01-10 PROCEDURE — 97530 THERAPEUTIC ACTIVITIES: CPT | Mod: GP | Performed by: PHYSICAL THERAPIST

## 2023-01-12 LAB — NONINV COLON CA DNA+OCC BLD SCRN STL QL: NEGATIVE

## 2023-03-27 PROBLEM — M25.521 RIGHT ELBOW PAIN: Status: RESOLVED | Noted: 2023-01-10 | Resolved: 2023-03-27

## 2023-04-12 DIAGNOSIS — D33.3 VESTIBULAR SCHWANNOMA (H): Primary | ICD-10-CM

## 2023-04-13 ENCOUNTER — OFFICE VISIT (OUTPATIENT)
Dept: OTOLARYNGOLOGY | Facility: CLINIC | Age: 48
End: 2023-04-13
Payer: COMMERCIAL

## 2023-04-13 ENCOUNTER — TELEPHONE (OUTPATIENT)
Dept: NEUROLOGY | Facility: CLINIC | Age: 48
End: 2023-04-13

## 2023-04-13 ENCOUNTER — OFFICE VISIT (OUTPATIENT)
Dept: AUDIOLOGY | Facility: CLINIC | Age: 48
End: 2023-04-13
Payer: COMMERCIAL

## 2023-04-13 ENCOUNTER — OFFICE VISIT (OUTPATIENT)
Dept: NEUROLOGY | Facility: CLINIC | Age: 48
End: 2023-04-13
Attending: OTOLARYNGOLOGY
Payer: COMMERCIAL

## 2023-04-13 ENCOUNTER — PRE VISIT (OUTPATIENT)
Dept: NEUROLOGY | Facility: CLINIC | Age: 48
End: 2023-04-13

## 2023-04-13 ENCOUNTER — ANCILLARY PROCEDURE (OUTPATIENT)
Dept: MRI IMAGING | Facility: CLINIC | Age: 48
End: 2023-04-13
Attending: OTOLARYNGOLOGY
Payer: COMMERCIAL

## 2023-04-13 VITALS
HEART RATE: 89 BPM | OXYGEN SATURATION: 95 % | RESPIRATION RATE: 16 BRPM | HEIGHT: 64 IN | WEIGHT: 156 LBS | SYSTOLIC BLOOD PRESSURE: 139 MMHG | DIASTOLIC BLOOD PRESSURE: 97 MMHG | TEMPERATURE: 97.4 F | BODY MASS INDEX: 26.63 KG/M2

## 2023-04-13 VITALS
DIASTOLIC BLOOD PRESSURE: 97 MMHG | OXYGEN SATURATION: 95 % | HEART RATE: 89 BPM | SYSTOLIC BLOOD PRESSURE: 139 MMHG | BODY MASS INDEX: 26.63 KG/M2 | TEMPERATURE: 97.4 F | HEIGHT: 64 IN | WEIGHT: 156 LBS

## 2023-04-13 DIAGNOSIS — H90.A32 MIXED CONDUCTIVE AND SENSORINEURAL HEARING LOSS OF LEFT EAR WITH RESTRICTED HEARING OF RIGHT EAR: ICD-10-CM

## 2023-04-13 DIAGNOSIS — H72.91 PERFORATION OF RIGHT TYMPANIC MEMBRANE: ICD-10-CM

## 2023-04-13 DIAGNOSIS — R03.0 ELEVATED BLOOD PRESSURE READING WITHOUT DIAGNOSIS OF HYPERTENSION: ICD-10-CM

## 2023-04-13 DIAGNOSIS — R51.9 ACUTE NONINTRACTABLE HEADACHE, UNSPECIFIED HEADACHE TYPE: ICD-10-CM

## 2023-04-13 DIAGNOSIS — G43.709 CHRONIC MIGRAINE WITHOUT AURA WITHOUT STATUS MIGRAINOSUS, NOT INTRACTABLE: Primary | ICD-10-CM

## 2023-04-13 DIAGNOSIS — D33.3 VESTIBULAR SCHWANNOMA (H): ICD-10-CM

## 2023-04-13 DIAGNOSIS — H69.93 ETD (EUSTACHIAN TUBE DYSFUNCTION), BILATERAL: ICD-10-CM

## 2023-04-13 DIAGNOSIS — D33.3 UNILATERAL VESTIBULAR SCHWANNOMA (H): Primary | ICD-10-CM

## 2023-04-13 DIAGNOSIS — H90.A21 SENSORINEURAL HEARING LOSS (SNHL) OF RIGHT EAR WITH RESTRICTED HEARING OF LEFT EAR: Primary | ICD-10-CM

## 2023-04-13 DIAGNOSIS — D33.3 VESTIBULAR SCHWANNOMA (H): Primary | ICD-10-CM

## 2023-04-13 PROCEDURE — A9585 GADOBUTROL INJECTION: HCPCS | Performed by: RADIOLOGY

## 2023-04-13 PROCEDURE — 92567 TYMPANOMETRY: CPT | Performed by: AUDIOLOGIST

## 2023-04-13 PROCEDURE — 92504 EAR MICROSCOPY EXAMINATION: CPT | Performed by: OTOLARYNGOLOGY

## 2023-04-13 PROCEDURE — 99213 OFFICE O/P EST LOW 20 MIN: CPT | Mod: 25 | Performed by: OTOLARYNGOLOGY

## 2023-04-13 PROCEDURE — 99202 OFFICE O/P NEW SF 15 MIN: CPT | Performed by: NEUROLOGICAL SURGERY

## 2023-04-13 PROCEDURE — 92557 COMPREHENSIVE HEARING TEST: CPT | Performed by: AUDIOLOGIST

## 2023-04-13 PROCEDURE — 99205 OFFICE O/P NEW HI 60 MIN: CPT | Performed by: NURSE PRACTITIONER

## 2023-04-13 PROCEDURE — 70553 MRI BRAIN STEM W/O & W/DYE: CPT | Performed by: RADIOLOGY

## 2023-04-13 RX ORDER — VERAPAMIL HYDROCHLORIDE 40 MG/1
TABLET ORAL
Qty: 60 TABLET | Refills: 5 | Status: SHIPPED | OUTPATIENT
Start: 2023-04-13 | End: 2024-04-23

## 2023-04-13 RX ORDER — GADOBUTROL 604.72 MG/ML
7.5 INJECTION INTRAVENOUS ONCE
Status: COMPLETED | OUTPATIENT
Start: 2023-04-13 | End: 2023-04-13

## 2023-04-13 RX ORDER — ONDANSETRON 4 MG/1
4 TABLET, ORALLY DISINTEGRATING ORAL EVERY 8 HOURS PRN
Qty: 20 TABLET | Refills: 3 | Status: SHIPPED | OUTPATIENT
Start: 2023-04-13 | End: 2024-06-19

## 2023-04-13 RX ADMIN — GADOBUTROL 7 ML: 604.72 INJECTION INTRAVENOUS at 07:26

## 2023-04-13 ASSESSMENT — PAIN SCALES - GENERAL
PAINLEVEL: NO PAIN (0)
PAINLEVEL: NO PAIN (0)

## 2023-04-13 NOTE — LETTER
4/13/2023      RE: Shauna Galdamez  [address] [place] [address]       See dictated note. Visit 15 minutes.  MD Maykel Shearer MD

## 2023-04-13 NOTE — LETTER
2023      RE: Shauna Galdamez  [address] [place] [address]       Service Date: 2023    Brigida Aldrich MD  [facility]  [address] [place] [address]  [address]    RE:  Shauna Galdamez  MRN:  [id number]  :  1975    Dear Dr. Aldrich:    We saw Ms. Galdamez back in [facility] today for followup of a small left sided vestibular schwannoma.  Her main complaint is ongoing fatigue, which she described last year as well.  She also was describing hair loss, but this is no longer an issue.  She is also having daily headaches and she is taking aspirin on a regular basis.  Her headaches seem to be mostly bifrontal but also along the bilateral frontal convexities.  She also describes associated light sensitivity with these headaches.  Subjectively, her hearing is worse on the left side.  She describes brief episodes of vertigo every 4-5 months.  For the most part, her equilibrium is still good.  She continues to have left TMJ dysfunction, which has prohibited conventional hearing aid placement. I saw her in conjunction with my [facility] partner from Neurootology, Dr. Mello, and I will refer you to her note for additional details.    On exam, she appears comfortable.  She appears to be in good health.  Blood pressure 139/97, heart rate 89, weight is 70.8 kg, BMI 26.78.    PHYSICAL EXAMINATION:  Her speech, language and phonation are normal.  Facial strength is normal throughout.  Extraocular movements are full without nystagmus.  She is moving her upper extremities with good strength and coordination.    REVIEW OF STUDIES:  We went over her audiogram from today.  She has essentially normal hearing on the right side with word recognition of 96%.  On the left side, she has moderate sloping to profound mixed hearing loss. Her word recognition is 92% on the left side.  We also went over her MRI from earlier today and compared it to previous  studies.  The left sided vestibular schwannoma is mostly intracanalicular.  There is a small component that protrudes into the cerebellopontine cistern.  There remains no contact with the brainstem.  The maximum dimension of the tumor is still about 10-11 mm.  Overall, the imaging is stable and favorable.    IMPRESSION AND PLAN:  Dr. Mello will be referring her back to Audiology for evaluation for a bone-anchored hearing aid.  She likely has some component of rebound headaches with her regular use of aspirin.  We asked her to start tapering the aspirin off.  There is some family history of migraines and we will have her evaluated in our [facility].  Finally, we asked her to follow up with you for continued evaluation of her fatigue, which is her chief complaint.  We will repeat an audiogram and MRI in 1 year.  Please do not hesitate to contact us with questions.    Sincerely,    Maykel Rivas MD        D: 2023   T: 2023   MT: [email or web address]    Name:     NEO LAGUNASCorrie  MRN:      [id number]        Account:      [id number]   :      1975           Service Date: 2023       Document: [id number]

## 2023-04-13 NOTE — LETTER
"2023      RE: Shauna Galdamez  [address] [place] [address]         Otolaryngology Clinic      Name: Shauna Galdamez  MRN: [id number]  Age: 47 year old  : 2023      Chief Complaint:   Follow up    History of Present Illness:   Shauna Galdamez is seen in the [facility] lateral skullbase clinic. She is a 47 year old female with a history of left tympanoplasty with cartilage backing, left myringotomy with left t-tube placement (2021) and left vestibular schwannoma who presents for follow up.    Today, she reports that her hearing has been a problem since the last visit. She expressed interest in hearing aids, but with her TMJ pain, she is unable to tolerate the placement of anything in the ear canal. She is unable to use the phone on the left. Patient also endorses increased fatigue recently. Additionally, every once in a while, she has episodes where she feels like she is spinning and off balance. However, within 5 days, this does correct itself. She also notes the recent development of frequent frontal headaches accompanied by light sensitivity. She has been taking double strength Aspirin as high as 3-4 times a day or as low as once a day. Patient's mother has a history of migraines, which especially worsened with menopause.    Physical Exam:   BP (!) 139/97   Pulse 89   Temp 97.4  F (36.3  C)   Ht 1.626 m (5' 4\")   Wt 70.8 kg (156 lb)   SpO2 95%   BMI 26.78 kg/m       Female in no acute distress.  Alert and answering questions appropriately.  HB 1/6 bilaterally.  Both ears examined under the microscope. Right small inferior perforation with a clean middle ear, some crusting was removed from the TM. Left t-tube in position and open, no retractions noted.     Audiogram:  AUDIOGRAM: She underwent an audiogram today. This demonstrates: right mild sensorineural hearing loss, left mild/moderate downsloping to profound mixed loss with a " high frequency conductive component.      Right: Speech reception threshold is 25 dB with 96% word recognition at 65 dB. Tympanogram B type   Left: Speech reception threshold is 50 dB with 92% word recognition at 90 dB. Tympanogram B type     Audiogram was independently reviewed and compared to her prior test. Hearing stable on the right, left stable except for a worsening in the conductive component at the highest frequencies. Today's left speech discrimination has improved with testing at 90dB versus 85dB.    Imaging: independently reviewed. Stable small approximately 1cm enhancing mass in the left IAC that does not impinge upon the fundus.  MR Brain  Impression: Little change in presumed left internal auditory canal vestibular schwannoma since 8/9/2021.    Assessment and Plan:  Shauna Galdamez is a 47 year old female with a history of left tympanoplasty with cartilage backing, left myringotomy with left t-tube placement (08/11/2021), right TM perforation and left vestibular schwannoma who presents for follow up. On audiogram, her hearing is stable other than the highest frequencies on the left, and her word understanding is improved today with testing at higher decibels. Her imaging is also stable. We discussed different options to amplify her left-sided hearing as she does not tolerate traditional hearing aids due to her TMJ dysfunction. Patient expressed interest in bone anchored hearing aids, and we will refer her for consultation to trial this. For her likely migraines, I will refer her to neurology. Her headaches are not due to the schwannoma. Both the schwannoma and the headaches could be contributing to the dizziness. We encouraged her to wean off the ibuprofen as this can cause rebound headaches, and Dr. Rivas advised her to keep a headache diary. She will follow up in a year with a repeat MRI and audiogram in skullbase clinic and we will do an ear check in 6 months.    Scribe Disclosure:  I,  Nereida Campuzano, am serving as a scribe to document services personally performed by Brigida Mello MD at this visit, based upon the provider's statements to me. All documentation has been reviewed by the aforementioned provider prior to being entered into the official medical record.     The documentation recorded by the scribe accurately reflects the services I personally performed and the decisions made by me.        Brigida Mello MD

## 2023-04-13 NOTE — PROGRESS NOTES
Service Date: 2023    Brigida Aldrich MD  [facility]  [address] [place] [address]  [id number]    RE:  Shauna Galdamez  MRN:  [id number]  :  1975    Dear Dr. Aldrich:    We saw Ms. Galdamez back in [facility] Surgery Clinic today for followup of a small left sided vestibular schwannoma.  Her main complaint is ongoing fatigue, which she described last year as well.  She also was describing hair loss, but this is no longer an issue.  She is also having daily headaches and she is taking aspirin on a regular basis.  Her headaches seem to be mostly bifrontal but also along the bilateral frontal convexities.  She also describes associated light sensitivity with these headaches.  Subjectively, her hearing is worse on the left side.  She describes brief episodes of vertigo every 4-5 months.  For the most part, her equilibrium is still good.  She continues to have left TMJ dysfunction, which has prohibited conventional hearing aid placement. I saw her in conjunction with my Skull Base Surgery partner from Neurootology, Dr. Mello, and I will refer you to her note for additional details.    On exam, she appears comfortable.  She appears to be in good health.  Blood pressure 139/97, heart rate 89, weight is 70.8 kg, BMI 26.78.    PHYSICAL EXAMINATION:  Her speech, language and phonation are normal.  Facial strength is normal throughout.  Extraocular movements are full without nystagmus.  She is moving her upper extremities with good strength and coordination.    REVIEW OF STUDIES:  We went over her audiogram from today.  She has essentially normal hearing on the right side with word recognition of 96%.  On the left side, she has moderate sloping to profound mixed hearing loss. Her word recognition is 92% on the left side.  We also went over her MRI from earlier today and compared it to previous studies.  The left sided vestibular schwannoma is mostly intracanalicular.  There is a small  component that protrudes into the cerebellopontine cistern.  There remains no contact with the brainstem.  The maximum dimension of the tumor is still about 10-11 mm.  Overall, the imaging is stable and favorable.    IMPRESSION AND PLAN:  Dr. Mello will be referring her back to Audiology for evaluation for a bone-anchored hearing aid.  She likely has some component of rebound headaches with her regular use of aspirin.  We asked her to start tapering the aspirin off.  There is some family history of migraines and we will have her evaluated in our Neurology Headache Clinic.  Finally, we asked her to follow up with you for continued evaluation of her fatigue, which is her chief complaint.  We will repeat an audiogram and MRI in 1 year.  Please do not hesitate to contact us with questions.    Sincerely,    Maykel Rivas MD        D: 2023   T: 2023   MT: everardo    Name:     NEO LAGUNAS  MRN:      [id number]        Account:      [id number]   :      1975           Service Date: 2023       Document: [id number]

## 2023-04-13 NOTE — PROGRESS NOTES
"  Otolaryngology Clinic      Name: Shauna Galdamez  MRN: [id number]  Age: 47 year old  : 2023      Chief Complaint:   Follow up    History of Present Illness:   Shauna Galdamez is seen in the [facility] lateral skullbase clinic. She is a 47 year old female with a history of left tympanoplasty with cartilage backing, left myringotomy with left t-tube placement (2021) and left vestibular schwannoma who presents for follow up.    Today, she reports that her hearing has been a problem since the last visit. She expressed interest in hearing aids, but with her TMJ pain, she is unable to tolerate the placement of anything in the ear canal. She is unable to use the phone on the left. Patient also endorses increased fatigue recently. Additionally, every once in a while, she has episodes where she feels like she is spinning and off balance. However, within 5 days, this does correct itself. She also notes the recent development of frequent frontal headaches accompanied by light sensitivity. She has been taking double strength Aspirin as high as 3-4 times a day or as low as once a day. Patient's mother has a history of migraines, which especially worsened with menopause.    Physical Exam:   BP (!) 139/97   Pulse 89   Temp 97.4  F (36.3  C)   Ht 1.626 m (5' 4\")   Wt 70.8 kg (156 lb)   SpO2 95%   BMI 26.78 kg/m       Female in no acute distress.  Alert and answering questions appropriately.  HB 1/6 bilaterally.  Both ears examined under the microscope. Right small inferior perforation with a clean middle ear, some crusting was removed from the TM. Left t-tube in position and open, no retractions noted.     Audiogram:  AUDIOGRAM: She underwent an audiogram today. This demonstrates: right mild sensorineural hearing loss, left mild/moderate downsloping to profound mixed loss with a high frequency conductive component.      Right: Speech reception threshold is 25 dB with " 96% word recognition at 65 dB. Tympanogram B type   Left: Speech reception threshold is 50 dB with 92% word recognition at 90 dB. Tympanogram B type     Audiogram was independently reviewed and compared to her prior test. Hearing stable on the right, left stable except for a worsening in the conductive component at the highest frequencies. Today's left speech discrimination has improved with testing at 90dB versus 85dB.    Imaging: independently reviewed. Stable small approximately 1cm enhancing mass in the left IAC that does not impinge upon the fundus.  MR Brain  Impression: Little change in presumed left internal auditory canal vestibular schwannoma since 8/9/2021.    Assessment and Plan:  Shauna Galdamez is a 47 year old female with a history of left tympanoplasty with cartilage backing, left myringotomy with left t-tube placement (08/11/2021), right TM perforation and left vestibular schwannoma who presents for follow up. On audiogram, her hearing is stable other than the highest frequencies on the left, and her word understanding is improved today with testing at higher decibels. Her imaging is also stable. We discussed different options to amplify her left-sided hearing as she does not tolerate traditional hearing aids due to her TMJ dysfunction. Patient expressed interest in bone anchored hearing aids, and we will refer her for consultation to trial this. For her likely migraines, I will refer her to neurology. Her headaches are not due to the schwannoma. Both the schwannoma and the headaches could be contributing to the dizziness. We encouraged her to wean off the ibuprofen as this can cause rebound headaches, and Dr. Rivas advised her to keep a headache diary. She will follow up in a year with a repeat MRI and audiogram in skullbase clinic and we will do an ear check in 6 months.    Scribe Disclosure:  Nereida ORTIZ, am serving as a scribe to document services personally performed by Brigida WAHL  MD Riaz at this visit, based upon the provider's statements to me. All documentation has been reviewed by the aforementioned provider prior to being entered into the official medical record.     The documentation recorded by the scribe accurately reflects the services I personally performed and the decisions made by me.

## 2023-04-13 NOTE — TELEPHONE ENCOUNTER
RECORDS RECEIVED FROM: Internal   REASON FOR VISIT: Vestibular schwannoma (H), Headache   Date of Appt: 4/13/23   NOTES (FOR ALL VISITS) STATUS DETAILS   OFFICE NOTE from referring provider Internal 4/13/23, 4/12/23, 2/24/22, 10/7/21 Brigida Mello MD @[facility]    See Additional Encounters   OFFICE NOTE from other specialist Internal 4/13/23 Torie Linares AuD @ [facility]    1/2/23 Roel Campoverde DO @ [facility]    8/10/21 Shabnam Aaron NP @ [facility]    7/7/21 Analy Edwards AuD @ [facility]   OPERATIVE REPORT Internal 8/8/21 Brigida Mello MD @[facility] TYMPANOPLASTY WITH CARTILAGE BACKING LEFT, T TUBE INSERTION     MEDICATION LIST Internal    IMAGING  (FOR ALL VISITS)     MRI (HEAD, NECK, SPINE) Internal [facility]  4/13/23 MR Brain  2/4/22 MR Brain  8/9/21 MR Internal Auditory Canal     CT (HEAD, NECK, SPINE) Internal [facility]  7/7/21 CT Temporal Bones

## 2023-04-13 NOTE — PROGRESS NOTES
Assessment & Plan      (G43.709) Chronic migraine without aura without status migrainosus, not intractable  Plan:  Transformed chromic migraine. Etiology -possible genetic +contributing factors-poor sleep, hormonal changes, stress, anxiety  Plan:  A trial of verapamil 40 mg daily for week than 40 mg twice daily for migraine prevention. Side effect-low blood pressure or heart rate, constipation, dizziness, stop if any chest pain, shortness of breath or peripheral legs swelling  Rescue treatment -a trial of triptan -will wait due to unknown blood pressure control   A trial of Nurtec for rescue treatment and decrease need for asprin use  Follow up in 3 months or sooner if needed     (R03.0) Elevated blood pressure reading without diagnosis of hypertension  (primary encounter diagnosis)  Comment:   Plan: 24 Hour Blood Pressure Monitor - Adult        If abnormal -follow up with PCP    (D33.3) Vestibular schwannoma (H)  Small left sided vestibular schwannoma asymptomatic -follow up with Dr Mello and Dr Rivas as recommended     Fatigue -TSH normal, glucose normal  CBC in 2021  CMP normal in July 2021  Follow up with Primary care in [facility] to discuss sleep problems and fatigue      Patient Instructions   Headache prevention:  A trial of verapamil 40 mg daily for week than 40 mg twice daily for migraine prevention. Side effect-low blood pressure or heart rate, constipation, dizziness, stop if any chest pain, shortness of breath or peripheral legs swelling  Rescue treatment -a trial of triptan -will wait due to unknown blood pressure control   A trial of Nurtec for headache rescue treatment as needed and might decrease need for asprin use. Side effects-nausea   A trial of ondansetron as needed for nausea   Follow up in 3 months or sooner if needed     Elevated blood pressure today and per chart review.   24 -hour BP monitor referral     Fatigue -TSH normal, glucose normal  CBC in 2021  CMP normal in July 2022  Follow up with  Primary care in [facility] to discuss sleep problems and fatigue    Small left sided vestibular schwannoma -follow up with Dr Mello and Dr Rivas as recommended         Return in about 3 months (around 7/13/2023).      Zenia Villatoro is a 47 year old, presenting for the following health issues:  Headache and Consult  small left sided vestibular schwannoma  HPI   Headaches started over 6-8 months ago. Does not remember headaches when was younger but one episode in Grad School-crashing pain, light and sound sensitivity and lasted 12 hours. Nothing noticeable since than.    Over last summer started getting headaches and often since fall 2022.   Fr-10-15/month and in the past 3 month and duration without treatment 3-5  Loc-frontal in the bilateral temples and achy and moderate in severity. Takes a lot of ASA. Affects daily activity or makes things harder to do.   Associated with light sensitivity at times, no nausea or vomiting.     Visual symptoms-eye exam recently and myopia and hard to focus sometimes   No visual     Associated with light sensitivity triggers a discomfort and headaches worse    FH-mother a chronic migraine suffers and motion sickness and balance issues.      Cannot wear hearing aids because of TMJ. Started in childhood and jaw surgery at the age 13 years old and 22 years old. Wears splint at night created last year at [facility].   Fatigue -crazy amount of fatigue in the last 6 months-exhausted   Was exposed and sick with COVID19.     Menstrual cycle -getting lighter but regular   Hot flashes/heat intolerance and started Kariva -OCP and started Oct 28th 2021  Depression mild and disturbing night thornton if not on any antidepressants so on prozac for that.   Interrupted sleep and hard to sleep between 2-4 am -does not wake up with headaches     Has 3 kids -9, 13, 17 years old    Exercise regularly but more difficult now with fatigue feeling      SH:  Teaches communication classes at the  [employer]    DATA:reviewed  TSH 2.75 4/7/2022  BG 7/29/2022    Objective    BP (!) 139/97   Pulse 89   Temp 97.4  F (36.3  C) (Oral)   Resp 16   Ht 1.626 m (5' 4\")   Wt 70.8 kg (156 lb)   SpO2 95%   BMI 26.78 kg/m    Body mass index is 26.78 kg/m .  Physical Exam   GENERAL: healthy, alert and no distress  EYES: Eyes grossly normal to inspection, PERRL and conjunctivae and sclerae normal  NECK: no adenopathy, no asymmetry, masses, or scars and thyroid normal to palpation  RESP: lungs clear to auscultation - no rales, rhonchi or wheezes  MS: no gross musculoskeletal defects noted, no edema  NEURO: Normal strength and tone, sensory exam grossly normal, mentation intact, speech normal, cranial nerves 2-12 intact, DTR's normal and symmetric and Romberg normal  PSYCH: mentation appears normal, affect normal    I discussed all my recommendations with Shauna Galdamez who verbalizes understanding and comfortable with the plan.  All of patient's questions were answered from the best of my knowledge.  Patient is in agreement with the plan.     60 minutes spent on the date of the encounter doing face to face visit, chart  review, exam, results review,  meds review, treatment plan, documentation and further activities as noted above    GT Cruz, CNP [facility]  Headache certified  [facility]          "

## 2023-04-13 NOTE — PATIENT INSTRUCTIONS
You were seen in the ENT Clinic today by Dr. Mello. If you have any questions or concerns after your appointment, please contact us (see below)      2.   Please return to clinic in one year with MRI and audiogram prior.   3.   A neurology referral has been sent for your headaches, please schedule at your convenience.   4.   Hearing device consult additionally placed, again please schedule at your convenience.         How to Contact Us:  Send a [employer] message to your provider. Our team will respond to you via [employer]. Occasionally, we will need to call you to get further information.  For urgent matters (Monday-Friday), call the ENT Clinic: [phone] and speak with a call center team member - they will route your call appropriately.   If you'd like to speak directly with a nurse, please find our contact information below. We do our best to check voicemail frequently throughout the day, and will work to call you back within 1-2 days. For urgent matters, please use the general clinic phone numbers listed above.      Mitra SANCHEZ RN  ENT RN Care Coordinator  Direct: [phone]    Angela HERNANDEZ LPN  Direct: [phone]

## 2023-04-13 NOTE — PROGRESS NOTES
AUDIOLOGY REPORT     SUMMARY: Audiology visit completed. See audiogram for results.       RECOMMENDATIONS: Follow-up with ENT.     Delroy Smith CCC-A  Licensed Audiologist   [place] #[id number]

## 2023-04-13 NOTE — LETTER
4/13/2023       RE: Shauna Galdamez  [address] [place] [facility] [address]     Dear Colleague,    Thank you for referring your patient, Shauna Galdamez, to the [facility] [place] at [facility]. Please see a copy of my visit note below.      Assessment & Plan      (G43.999) Chronic migraine without aura without status migrainosus, not intractable  Plan:  Transformed chromic migraine. Etiology -possible genetic +contributing factors-poor sleep, hormonal changes, stress, anxiety  Plan:  A trial of verapamil 40 mg daily for week than 40 mg twice daily for migraine prevention. Side effect-low blood pressure or heart rate, constipation, dizziness, stop if any chest pain, shortness of breath or peripheral legs swelling  Rescue treatment -a trial of triptan -will wait due to unknown blood pressure control   A trial of Nurtec for rescue treatment and decrease need for asprin use  Follow up in 3 months or sooner if needed     (R03.0) Elevated blood pressure reading without diagnosis of hypertension  (primary encounter diagnosis)  Comment:   Plan: 24 Hour Blood Pressure Monitor - Adult        If abnormal -follow up with PCP    (D33.3) Vestibular schwannoma (H)  Small left sided vestibular schwannoma asymptomatic -follow up with Dr Mello and Dr Rivas as recommended     Fatigue -TSH normal, glucose normal  CBC in 2021  CMP normal in July 2021  Follow up with Primary care in [facility] to discuss sleep problems and fatigue      Patient Instructions   Headache prevention:  A trial of verapamil 40 mg daily for week than 40 mg twice daily for migraine prevention. Side effect-low blood pressure or heart rate, constipation, dizziness, stop if any chest pain, shortness of breath or peripheral legs swelling  Rescue treatment -a trial of triptan -will wait due to unknown blood pressure control   A trial of Nurtec for headache rescue treatment as  needed and might decrease need for asprin use. Side effects-nausea   A trial of ondansetron as needed for nausea   Follow up in 3 months or sooner if needed     Elevated blood pressure today and per chart review.   24 -hour BP monitor referral     Fatigue -TSH normal, glucose normal  CBC in 2021  CMP normal in July 2022  Follow up with Primary care in [facility] to discuss sleep problems and fatigue    Small left sided vestibular schwannoma -follow up with Dr Mello and Dr Rivas as recommended         Return in about 3 months (around 7/13/2023).      Subjective   Shauna is a 47 year old, presenting for the following health issues:  Headache and Consult  small left sided vestibular schwannoma  HPI   Headaches started over 6-8 months ago. Does not remember headaches when was younger but one episode in Grad School-crashing pain, light and sound sensitivity and lasted 12 hours. Nothing noticeable since than.    Over last summer started getting headaches and often since fall 2022.   Fr-10-15/month and in the past 3 month and duration without treatment 3-5  Loc-frontal in the bilateral temples and achy and moderate in severity. Takes a lot of ASA. Affects daily activity or makes things harder to do.   Associated with light sensitivity at times, no nausea or vomiting.     Visual symptoms-eye exam recently and myopia and hard to focus sometimes   No visual     Associated with light sensitivity triggers a discomfort and headaches worse    FH-mother a chronic migraine suffers and motion sickness and balance issues.      Cannot wear hearing aids because of TMJ. Started in childhood and jaw surgery at the age 13 years old and 22 years old. Wears splint at night created last year at [facility].   Fatigue -crazy amount of fatigue in the last 6 months-exhausted   Was exposed and sick with COVID19.     Menstrual cycle -getting lighter but regular   Hot flashes/heat intolerance and started Kariva -OCP and started Oct 28th  "2021  Depression mild and disturbing night thornton if not on any antidepressants so on prozac for that.   Interrupted sleep and hard to sleep between 2-4 am -does not wake up with headaches     Has 3 kids -9, 13, 17 years old    Exercise regularly but more difficult now with fatigue feeling      SH:  Teaches communication classes at the     DATA:reviewed  TSH 2.75 4/7/2022  BG 7/29/2022    Objective    BP (!) 139/97   Pulse 89   Temp 97.4  F (36.3  C) (Oral)   Resp 16   Ht 1.626 m (5' 4\")   Wt 70.8 kg (156 lb)   SpO2 95%   BMI 26.78 kg/m    Body mass index is 26.78 kg/m .  Physical Exam   GENERAL: healthy, alert and no distress  EYES: Eyes grossly normal to inspection, PERRL and conjunctivae and sclerae normal  NECK: no adenopathy, no asymmetry, masses, or scars and thyroid normal to palpation  RESP: lungs clear to auscultation - no rales, rhonchi or wheezes  MS: no gross musculoskeletal defects noted, no edema  NEURO: Normal strength and tone, sensory exam grossly normal, mentation intact, speech normal, cranial nerves 2-12 intact, DTR's normal and symmetric and Romberg normal  PSYCH: mentation appears normal, affect normal    I discussed all my recommendations with Shauna Galdamez who verbalizes understanding and comfortable with the plan.  All of patient's questions were answered from the best of my knowledge.  Patient is in agreement with the plan.     60 minutes spent on the date of the encounter doing face to face visit, chart  review, exam, results review,  meds review, treatment plan, documentation and further activities as noted above    GT Cruz, CNP [facility]  Headache certified  [facility]        "

## 2023-04-13 NOTE — TELEPHONE ENCOUNTER
PRIOR AUTHORIZATION DENIED    Medication: rimegepant (NURTEC) 75 MG ODT tablet - EPA DENIED    Denial Date: 4/13/2023    Denial Rational:       Appeal Information:

## 2023-04-13 NOTE — DISCHARGE INSTRUCTIONS
MRI Contrast Discharge Instructions    The IV contrast you received today will pass out of your body in your  urine. This will happen in the next 24 hours. You will not feel this process.  Your urine will not change color.    Drink at least 4 extra glasses of water or juice today (unless your doctor  has restricted your fluids). This reduces the stress on your kidneys.  You may take your regular medicines.    If you are on dialysis: It is best to have dialysis today.    If you have a reaction: Most reactions happen right away. If you have  any new symptoms after leaving the hospital (such as hives or swelling),  call your hospital at the correct number below. Or call your family doctor.  If you have breathing distress or wheezing, call 911.    Special instructions: ***    I have read and understand the above information.    Signature:______________________________________ Date:___________    Staff:__________________________________________ Date:___________     Time:__________    [facility] Radiology Departments:    ___[place]: [phone]  ___[facility]: [phone]  ___[place]: [phone] ___[facility]: [phone]  ___[facility]: [phone]  ___[facility]: [phone]  ___[phone]  ___[facility]: [phone]  ___[phone]

## 2023-04-13 NOTE — PATIENT INSTRUCTIONS
Headache prevention:  A trial of verapamil 40 mg daily for week than 40 mg twice daily for migraine prevention. Side effect-low blood pressure or heart rate, constipation, dizziness, stop if any chest pain, shortness of breath or peripheral legs swelling  Rescue treatment -a trial of triptan -will wait due to unknown blood pressure control   A trial of Nurtec for headache rescue treatment as needed and might decrease need for asprin use. Side effects-nausea   A trial of ondansetron as needed for nausea   Follow up in 3 months or sooner if needed     Elevated blood pressure today and per chart review.   24 -hour BP monitor referral     Fatigue -TSH normal, glucose normal  CBC in 2021  CMP normal in July 2022  Follow up with Primary care in [facility] to discuss sleep problems and fatigue    Small left sided vestibular schwannoma -follow up with Dr Mello and Dr Rivas as recommended

## 2023-04-13 NOTE — NURSING NOTE
"Chief Complaint   Patient presents with     RECHECK     Yearly follow up with MRI and audio     ,Blood pressure (!) 139/97, pulse 89, temperature 97.4  F (36.3  C), height 1.626 m (5' 4\"), weight 70.8 kg (156 lb), SpO2 95 %, not currently breastfeeding.    Richard Montes De Oca LPN      "

## 2023-04-14 NOTE — TELEPHONE ENCOUNTER
Sent pt a my chart message asking if she has tried any of the medications listed below in the nurtec denial letter.

## 2023-04-17 ENCOUNTER — HOSPITAL ENCOUNTER (OUTPATIENT)
Dept: CARDIOLOGY | Facility: CLINIC | Age: 48
Discharge: HOME | End: 2023-04-17
Attending: NURSE PRACTITIONER | Admitting: NURSE PRACTITIONER
Payer: COMMERCIAL

## 2023-04-17 DIAGNOSIS — R03.0 ELEVATED BLOOD PRESSURE READING WITHOUT DIAGNOSIS OF HYPERTENSION: ICD-10-CM

## 2023-04-17 PROCEDURE — 93790 AMBL BP MNTR W/SW I&R: CPT | Performed by: INTERNAL MEDICINE

## 2023-04-17 PROCEDURE — 93786 AMBL BP MNTR W/SW REC ONLY: CPT

## 2023-04-26 ENCOUNTER — OFFICE VISIT (OUTPATIENT)
Dept: URGENT CARE | Facility: URGENT CARE | Age: 48
End: 2023-04-26
Payer: COMMERCIAL

## 2023-04-26 ENCOUNTER — TELEPHONE (OUTPATIENT)
Dept: NEUROLOGY | Facility: CLINIC | Age: 48
End: 2023-04-26

## 2023-04-26 ENCOUNTER — ANCILLARY PROCEDURE (OUTPATIENT)
Dept: GENERAL RADIOLOGY | Facility: CLINIC | Age: 48
End: 2023-04-26
Attending: NURSE PRACTITIONER
Payer: COMMERCIAL

## 2023-04-26 VITALS
WEIGHT: 156 LBS | RESPIRATION RATE: 16 BRPM | SYSTOLIC BLOOD PRESSURE: 126 MMHG | DIASTOLIC BLOOD PRESSURE: 84 MMHG | BODY MASS INDEX: 26.78 KG/M2 | OXYGEN SATURATION: 98 % | TEMPERATURE: 98.1 F | HEART RATE: 80 BPM

## 2023-04-26 DIAGNOSIS — R79.89 ELEVATED TROPONIN: ICD-10-CM

## 2023-04-26 DIAGNOSIS — M54.2 NECK PAIN ON RIGHT SIDE: ICD-10-CM

## 2023-04-26 DIAGNOSIS — M54.6 ACUTE MIDLINE THORACIC BACK PAIN: ICD-10-CM

## 2023-04-26 DIAGNOSIS — M54.6 ACUTE MIDLINE THORACIC BACK PAIN: Primary | ICD-10-CM

## 2023-04-26 DIAGNOSIS — G43.709 CHRONIC MIGRAINE WITHOUT AURA WITHOUT STATUS MIGRAINOSUS, NOT INTRACTABLE: Primary | ICD-10-CM

## 2023-04-26 LAB
D DIMER PPP FEU-MCNC: <0.27 UG/ML FEU (ref 0–0.5)
TROPONIN T SERPL HS-MCNC: 19 NG/L

## 2023-04-26 PROCEDURE — 36415 COLL VENOUS BLD VENIPUNCTURE: CPT | Performed by: NURSE PRACTITIONER

## 2023-04-26 PROCEDURE — 84484 ASSAY OF TROPONIN QUANT: CPT | Performed by: NURSE PRACTITIONER

## 2023-04-26 PROCEDURE — 99214 OFFICE O/P EST MOD 30 MIN: CPT | Performed by: NURSE PRACTITIONER

## 2023-04-26 PROCEDURE — 71046 X-RAY EXAM CHEST 2 VIEWS: CPT | Mod: TC | Performed by: RADIOLOGY

## 2023-04-26 PROCEDURE — 85379 FIBRIN DEGRADATION QUANT: CPT | Performed by: NURSE PRACTITIONER

## 2023-04-26 PROCEDURE — 93000 ELECTROCARDIOGRAM COMPLETE: CPT | Performed by: NURSE PRACTITIONER

## 2023-04-26 NOTE — PROGRESS NOTES
Chief Complaint   Patient presents with     Urgent Care     Chest Pain     Pt wants an EKG.     Neck Pain     Jaw Pain     SUBJECTIVE:  Shauna Galdamez is a 47 year old female who presents to the clinic today with mid thoracic back and right neck pain over the last day.  She says it is a dull 1/10 currently, woke her up in the middle of the night and experienced it again this morning for a couple minutes more moderately. She has also been undergoing recent htn, brief moment of dyspnea on exertion, dizziness, tingling bilaterally with recent diagnosis of benign brain tumor vestibular schwannoma over the last couple weeks.  Does relay that there could be some anxiety stress and psychogenic nature to the these symptoms.  She had lasagna for dinner last night and does note some burping and feels like maybe she just slept on her neck wrong with tension and tight muscles, reproducible.  Does not feel this warrants the ER at this time.  Declines any DVT PE history hemoptysis pleuritic chest pain bulging varicose veins swollen calf.  Has had bilateral calf cramps recently.  Early MI in her grandmother.    Past Medical History:   Diagnosis Date     Anxiety      Cherry angioma 2/6/2017     Closed fracture of tibia 1/13/2016     Depression      Mixed conductive and sensorineural hearing 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 7/9/2021    Added automatically from request for surgery [id number]     Perforation of tympanic membrane, bilateral 7/9/2021    Added automatically from request for surgery [id number]     Solar lentiginosis 2/6/2017     FLUoxetine (PROZAC) 40 MG capsule, Take 1 capsule (40 mg) by mouth daily  verapamil (CALAN) 40 MG tablet, Take one tab daily for a week than take one tab twice daily if tolerated  desogestrel-ethinyl estradiol (KARIVA) 0.15-0.02/0.01 MG (21/5) tablet, Take 1 tablet by mouth daily  ondansetron (ZOFRAN ODT) 4 MG ODT tab, Take 1 tablet (4 mg) by mouth every 8 hours as needed  for nausea    No current facility-administered medications on file prior to visit.    Social History     Tobacco Use     Smoking status: Never     Smokeless tobacco: Never   Vaping Use     Vaping status: Never Used   Substance Use Topics     Alcohol use: Yes     Comment: glass of wine/night     Allergies   Allergen Reactions     Sulfa Antibiotics Unknown     Bupropion Rash     Annotation: rash and joint pain         Review of Systems   All systems negative except for those listed above in HPI.    EXAM:   /84   Pulse 80   Temp 98.1  F (36.7  C) (Oral)   Resp 16   Wt 70.8 kg (156 lb)   SpO2 98%   BMI 26.78 kg/m      Physical Exam  Vitals reviewed.   Constitutional:       General: She is not in acute distress.     Appearance: Normal appearance. She is well-developed. She is not ill-appearing, toxic-appearing or diaphoretic.   HENT:      Head: Normocephalic and atraumatic.   Eyes:      Conjunctiva/sclera: Conjunctivae normal.      Pupils: Pupils are equal, round, and reactive to light.   Neck:      Vascular: No carotid bruit.   Cardiovascular:      Rate and Rhythm: Normal rate and regular rhythm.      Pulses: Normal pulses.      Heart sounds: Normal heart sounds. No murmur heard.     No friction rub. No gallop.   Pulmonary:      Effort: Pulmonary effort is normal. No respiratory distress.      Breath sounds: Normal breath sounds. No stridor. No wheezing, rhonchi or rales.   Chest:      Chest wall: No tenderness.   Abdominal:      General: Abdomen is flat. Bowel sounds are normal.      Palpations: Abdomen is soft.   Musculoskeletal:         General: Tenderness (midline thoracic back) present. Normal range of motion.      Cervical back: Normal range of motion and neck supple. Tenderness (right anterior tense tight) present. No rigidity.      Right lower leg: No edema.      Left lower leg: No edema.   Lymphadenopathy:      Cervical: No cervical adenopathy.   Skin:     General: Skin is warm.      Capillary  Refill: Capillary refill takes less than 2 seconds.      Findings: No rash.   Neurological:      General: No focal deficit present.      Mental Status: She is alert and oriented to person, place, and time.      Sensory: Sensory deficit: no current tingling.      Motor: No weakness.      Coordination: Coordination normal.      Gait: Gait normal.   Psychiatric:         Mood and Affect: Mood normal.         Behavior: Behavior normal.       EKG done in clinic read by me as normal sinus rhythm without ST elevation tombstone. There is some poor R wave progression.  X-ray done in clinic read by me as negative for cardiopulmonary abnormality.  Results for orders placed or performed in visit on 04/26/23   XR Chest 2 Views     Status: None    Narrative    CHEST TWO VIEWS  4/26/2023 11:09 AM     HISTORY: 47-year-old woman with acute midline thoracic back pain and  hypertension. Dizziness.    COMPARISON: None       Impression    IMPRESSION: Heart size is normal. No pleural effusion, pneumothorax,  or area of consolidation. Mild scoliotic curvature of the thoracic  spine, apex right, though may in part be positional.    MARJORIE SORIANO MD         SYSTEM ID:  [id number]   Results for orders placed or performed in visit on 04/26/23   D dimer quantitative     Status: Normal   Result Value Ref Range    D-Dimer Quantitative <0.27 0.00 - 0.50 ug/mL FEU    Narrative    This D-dimer assay is intended for use in conjunction with a clinical pretest probability assessment model to exclude pulmonary embolism (PE) and deep venous thrombosis (DVT) in outpatients suspected of PE or DVT. The cut-off value is 0.50 ug/mL FEU.   Troponin T, High Sensitivity     Status: Abnormal   Result Value Ref Range    Troponin T, High Sensitivity 19 (H) <=14 ng/L     ASSESSMENT:    ICD-10-CM    1. Acute midline thoracic back pain  M54.6 lidocaine (viscous) (XYLOCAINE) 2 % 15 mL, alum & mag hydroxide-simethicone (MAALOX) 15 mL GI Cocktail     Troponin T, High  Sensitivity     D dimer, quantitative     XR Chest 2 Views     CANCELED: Troponin T, High Sensitivity     CANCELED: D dimer, quantitative      2. Neck pain on right side  M54.2 EKG 12-lead complete w/read - Clinics      3. Elevated troponin  R77.8         PLAN:     GI cocktail given in clinic  EKG x-ray Trop D-dimer done in clinic  Returned to the room and patient was completely asymptomatic  We will call with results in about 2 hours  Differentials include GERD musculoskeletal tension psychogenic anxiety stress versus cardiac or PE  Advised to present to the ER immediately if any symptoms of worsening pain chest pressure shortness of breath dizziness palpitations left arm and jaw pain tingling weakness GI upset nausea vomiting clammy    Follow up with primary care provider with any problems, questions or concerns or if symptoms worsen or fail to improve. Patient agreed to plan and verbalized understanding.    Makayla Garcias, FÁTIMA-[facility] URGENT [facility] [place]

## 2023-04-26 NOTE — TELEPHONE ENCOUNTER
Prior Authorization Retail Medication Request    Medication/Dose: ubrogepant (UBRELVY) 50 MG tablet  ICD code (if different than what is on RX):  G43.709  Previously Tried and Failed:  See Chart  Rationale:  See chart    Insurance Name:  [employer] V[employer] FULLY INSURED  Insurance ID:  [id number]       Pharmacy Information (if different than what is on RX)  Name:    Phone:

## 2023-04-26 NOTE — PROGRESS NOTES
Troponin came back at 1300 elevated, 19.  Called patient - she lives next to [facility] and will present to the ER immediately.  Discussed with MD on staff there and they are expecting her.

## 2023-04-26 NOTE — RESULT ENCOUNTER NOTE
Hi Dr Aldrich, I saw Shauna for headaches. Her blood pressure 24-monitor abnormal and indicates she has hypertension. I asked patient to follow up with you about this.   Thank you,  Trinity

## 2023-04-28 ENCOUNTER — TELEPHONE (OUTPATIENT)
Dept: NEUROLOGY | Facility: CLINIC | Age: 48
End: 2023-04-28
Payer: COMMERCIAL

## 2023-04-28 NOTE — TELEPHONE ENCOUNTER
Prior Authorization Retail Medication Request    Medication/Dose: ubrogepant (UBRELVY) 50 MG tablet  ICD code (if different than what is on RX):  Previously Tried and Failed:  Currently taking verapamil.  Can not take triptans due to poor blood pressure control.   Rationale: migraine     Insurance Name:  [employer]  Insurance ID: [id number]        Pharmacy Information (if different than what is on RX)  Name:    Phone:

## 2023-04-30 NOTE — TELEPHONE ENCOUNTER
PA Initiation    Medication: ubrogepant (UBRELVY) 50 MG tablet   Insurance Company: [employer] ([facility]) - Phone [phone] Fax [phone]  Pharmacy Filling the Rx: [facility] #[id number] - [place], [place] - [address]  Filling Pharmacy Phone: [phone]  Filling Pharmacy Fax: [phone]  Start Date: 4/30/2023

## 2023-04-30 NOTE — TELEPHONE ENCOUNTER
PRIOR AUTHORIZATION DENIED    Medication: ubrogepant (UBRELVY) 50 MG tablet--DENIED    Denial Date: 4/30/2023    Denial Rational:       Appeal Information:

## 2023-05-09 DIAGNOSIS — G43.709 CHRONIC MIGRAINE WITHOUT AURA WITHOUT STATUS MIGRAINOSUS, NOT INTRACTABLE: Primary | ICD-10-CM

## 2023-05-10 ENCOUNTER — TELEPHONE (OUTPATIENT)
Dept: NEUROLOGY | Facility: CLINIC | Age: 48
End: 2023-05-10
Payer: COMMERCIAL

## 2023-05-10 NOTE — TELEPHONE ENCOUNTER
Called pt and offered her botox appt tomorrow; however, she wants to check with Sherita on how much she would have to pay before proceeding with appt .

## 2023-05-10 NOTE — TELEPHONE ENCOUNTER
Medication Appeal Initiation    We have initiated an appeal for the requested medication:  Medication: ubrogepant (UBRELVY) 50 MG tablet--DENIED  Appeal Start Date:  5/10/2023  Insurance Company: [employer] ([facility]) - Phone [phone] Fax [phone]  Comments:

## 2023-05-10 NOTE — TELEPHONE ENCOUNTER
This patient is good to go:     TBS / [facility] NEURO / BOTOX () - No PA required - per policy - G43.709 is covered - 200 units every 84 days - emailed JUAN to pt on 05.10.23. for copay assistance     Thank you,     Sherita

## 2023-05-12 NOTE — TELEPHONE ENCOUNTER
MEDICATION APPEAL APPROVED    Medication: ubrogepant (UBRELVY) 50 MG tablet--APPEAL APPROVED  Authorization Effective Date: 5/11/2023  Authorization Expiration Date: 5/11/2024  Approved Dose/Quantity:   Reference #:     Insurance Company: [employer]au ([facility]) - Phone [phone] Fax [phone]  Expected CoPay:       CoPay Card Available:      Foundation Assistance Needed:    Which Pharmacy is filling the prescription (Not needed for infusion/clinic administered): [id number] - [place], [place] - [address]    Contacted insurance plan to follow up on request.  Received approval info verbally.

## 2023-08-17 ENCOUNTER — TELEPHONE (OUTPATIENT)
Dept: OBGYN | Facility: CLINIC | Age: 48
End: 2023-08-17

## 2023-08-17 DIAGNOSIS — N95.1 PERIMENOPAUSAL VASOMOTOR SYMPTOMS: ICD-10-CM

## 2023-08-17 RX ORDER — DESOGESTREL AND ETHINYL ESTRADIOL 21-5 (28)
1 KIT ORAL DAILY
Qty: 28 TABLET | Refills: 0 | Status: SHIPPED | OUTPATIENT
Start: 2023-08-17 | End: 2024-01-30 | Stop reason: ALTCHOICE

## 2023-08-17 NOTE — TELEPHONE ENCOUNTER
Called patient back. Set up annual appointment with Shauna Casillas.  Limited refill sent per clinic protocol

## 2023-08-17 NOTE — TELEPHONE ENCOUNTER
[facility] Call Center    Phone Message    May a detailed message be left on voicemail: yes     Reason for Call: Pt calling regarding medication desogestrel-ethinyl estradiol (KARIVA) , prescribed by Sonja. PT states clinic hasn't responded to any refill requests from pharmacy and pt is wanting to speak to a care team member regarding this. Please call pt     Action Taken:  W HS    Travel Screening: Not Applicable

## 2023-08-22 NOTE — TELEPHONE ENCOUNTER
Attempted to call Shauna back regarding her Estradiol prescription and received voicemail. Left voice message informing Shauna a 28-day supply was sent to her pharmacy on 8/17 and that prescription should last her 28-days (does not need refill prior to appointment on 8/31). I provided Shauna with the clinic call back number if she has any further questions or concerns.

## 2023-08-22 NOTE — TELEPHONE ENCOUNTER
Pt is calling again wondering if she can get another refill sent since she had to push her appt out another week to 8/31, pts pharmacy is [facility]Y #[id number] - [place], [place] - [address] , please call Shauna, thank you!

## 2023-09-12 ENCOUNTER — TELEPHONE (OUTPATIENT)
Dept: AUDIOLOGY | Facility: CLINIC | Age: 48
End: 2023-09-12

## 2023-09-12 NOTE — TELEPHONE ENCOUNTER
[facility] Call Center    Phone Message    May a detailed message be left on voicemail: yes     Reason for Call: Appointment Intake    Pt called because she got a HA with us in 2020 and is now considering a BAHA but wants to explore smaller HA options (Previous one caused a lot of issues) before pursuing the whole process of BAHA at Dr. Bobo suggestion. Wasn't sure if we needed to schedule a hearing test, etc....just for her to explore all options before committing to the BAHA. Please call pt to discuss. Thank you.     Action Taken: Message routed to:  [facility]    Travel Screening: Not Applicable

## 2023-09-19 NOTE — TELEPHONE ENCOUNTER
AJM to schedule ZACH 120     Appt notes : ZACH 120 and put in the appointment notes patient currently has hearing aid she wants to discuss/talk about smaller options as well as potentially BAHA so we have enough time to do both if necessary     Then HFP and IRP after in case they decide on aids instead      Gave direct number

## 2023-09-27 NOTE — PROGRESS NOTES
Progress Note  SUBJECTIVE:  Shauna Galdamez is an 48 year old, , who requests an Annual Gyn Preventive Exam.   She had a general preventive health visit in 2023 with Roel Campoverde.     Concerns today include:   - Increased anxiety. Seeing a therapist once/week, feels this is helpful. Reports that things that never made her anxious before are now causing anxiety.  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 She was seen at [facility] and her Fluoxetine dose was increased from 40mg to 60mg about 1 week ago. Additionally, she was prescribed clonazepam PRN. She has taken the Clonazepam about 2-3 times with relief (taking 1/2 tablet).   - Increased hot flashes and fatigue. Started on a MILLER to manage these symptoms in 2021; symptoms had been well managed, but now finding that she is having an increase in VMS symptoms and vaginal dryness. Not using lubricant for intercourse. New diagnosis of HTN since she was last seen, managed with medication.   - Follow-up to labs drawn in May when evaluated for chest pain - her TSH was elevated (9.85) and T4 was low (0.89). No follow-up since this time.   - She reports vaginal dryness and asked about lubricants that are recommended for this.     LMP around 2 weeks ago. Menstrual cycle regular and has gotten lighter over the past year.     Last pap smear: 2021 NIL, HPV negative  History of abnormal Pap smear: NO - age 30-65 PAP every 5 years with negative HPV co-testing recommended    Mammogram current: no (patient will schedule)  Last Mammogram: 2022, showed heterogenerously dense tissue    Last Colonoscopy:Cologuard completed 23    Last lipid profile: 2022 normal    HISTORY:  desogestrel-ethinyl estradiol (KARIVA) 0.15-0.02/0.01 MG () tablet, Take 1 tablet by mouth daily  FLUoxetine (PROZAC) 40 MG capsule, Take 1 capsule (40 mg) by mouth daily (Patient  taking differently: Take 60 mg by mouth daily)  losartan-hydrochlorothiazide (HYZAAR) 50-12.5 MG tablet, Take 1 tablet by mouth daily  ondansetron (ZOFRAN ODT) 4 MG ODT tab, Take 1 tablet (4 mg) by mouth every 8 hours as needed for nausea (Patient not taking: Reported on 2023)  ubrogepant (UBRELVY) 50 MG tablet, Take 1-2 tablets ( mg) by mouth at onset of headache (migraine) if needed a second dose may be taken at least 2 hours after the initial dose; MAX, 4 tablets /24 hrs (Patient not taking: Reported on 2023)  verapamil (CALAN) 40 MG tablet, Take one tab daily for a week than take one tab twice daily if tolerated (Patient not taking: Reported on 2023)    Botulinum Toxin Type A (BOTOX) 200 units injection 155 Units      Allergies   Allergen Reactions    Sulfa Antibiotics Unknown    Bupropion Rash     Annotation: rash and joint pain       Immunization History   Administered Date(s) Administered    COVID-19 Bivalent 12+ (Pfizer) 2022    COVID-19 Monovalent 18+ (Moderna) 2021, 2021, 11/15/2021    FLU 6-35 months 2009, 10/19/2012, 10/16/2015    [id number] Novel Flu 2009    Influenza (H1N1) 2009    Influenza (IIV3) PF 2009, 10/15/2010, 01/10/2012, 10/19/2012, 10/30/2015    Influenza Vaccine >6 months (Alfuria,Fluzone) 10/05/2018, 10/03/2019, 2021, 2022    Influenza,INJ,MDCK,PF,Quad >6mo(Flucelvax) 09/15/2020    Nasal Influenza Vaccine 2-49 (FluMist) 10/14/2013, 2014    Rabavert 07/10/2017, 2017, 2017, 2017    TDAP (Adacel,Boostrix) 2004, 2014    Td (Adult), Adsorbed 1973, 2004    Tdap (Adult) Unspecified Formulation 1973       OB History    Para Term  AB Living   0 0 0 0 0 0   SAB IAB Ectopic Multiple Live Births   0 0 0 0 0   Obstetric Comments   Has 3 adopted children     Past Medical History:   Diagnosis Date    Anxiety     Cherry angioma 2017    Closed fracture of tibia  1/13/2016    Depression     Mixed conductive and sensorineural hearing 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 7/9/2021    Added automatically from request for surgery [id number]    Perforation of tympanic membrane, bilateral 7/9/2021    Added automatically from request for surgery [id number]    Solar lentiginosis 2/6/2017     Past Surgical History:   Procedure Laterality Date    ANKLE SURGERY Bilateral     ORTHOPEDIC SURGERY      PE TUBES      TYMPANOPLASTY Left 8/11/2021    Procedure: TYMPANOPLASTY WITH CARTILAGE BACKING LEFT, T TUBE INSERTION;  Surgeon: Brigida Mello MD;  Location: [facility]     Family History   Problem Relation Age of Onset    Skin Cancer Mother         BCC    Mental Illness Mother     Thyroid Disease Mother     Stomach Problem Mother     Hypertension Mother     Substance Abuse Father     No Known Problems Sister     No Known Problems Brother     Heart Disease Maternal Grandmother     No Known Problems Maternal Grandfather     No Known Problems Paternal Grandmother     No Known Problems Other     Melanoma No family hx of      Social History     Socioeconomic History    Marital status:    Tobacco Use    Smoking status: Never    Smokeless tobacco: Never   Vaping Use    Vaping Use: Never used   Substance and Sexual Activity    Alcohol use: Yes     Comment: glass of wine/night    Drug use: No    Sexual activity: Yes     Partners: Male     Birth control/protection: None   Social History Narrative    How much exercise per week? Few walks and yoga    How much calcium per day? Through foods       How much caffeine per day? Two cups coffee    How much vitamin D per day? Through foods    Do you/your family wear seatbelts?  Yes    Do you/your family use safety helmets? Yes    Do you/your family use sunscreen? Yes    Do you/your family keep firearms in the home? No    Do you/your family have a smoke detector(s)? Yes                       Review of Systems     Constitutional:  Positive for  "fatigue and night sweats. Negative for fever, chills, weight loss, weight gain, decreased appetite, recent stressors, height gain, height loss, post-operative complications, incisional pain, hallucinations and hyperactivity.   Respiratory:   Negative for cough and dyspnea on exertion.    Cardiovascular:  Negative for chest pain and dyspnea on exertion.   Gastrointestinal:  Negative for abdominal pain, diarrhea, constipation and bloating.   Genitourinary:  Positive for hot flashes and vaginal dryness. Negative for voiding less frequently.   Psychiatric/Behavioral:  Positive for mood swings. Negative for hallucinations.    Breast:  Negative for breast discharge, breast mass, breast pain and nipple retraction.         10/5/2018    10:17 AM 10/20/2022    11:42 AM 9/28/2023    10:33 AM   PHQ-9 SCORE   PHQ-9 Total Score MyChart  4 (Minimal depression)    PHQ-9 Total Score 3 4 7         10/20/2022    11:42 AM 1/2/2023    10:58 AM 9/28/2023    10:33 AM   SUKHI-7 SCORE   Total Score 2 (minimal anxiety) 0 (minimal anxiety)    Total Score 2 0 8     EXAM:  Blood pressure 125/85, pulse 92, height 1.626 m (5' 4\"), weight 72.9 kg (160 lb 11.2 oz), not currently breastfeeding. Body mass index is 27.58 kg/m .  General - pleasant female in no acute distress.  Skin - no suspicious lesions or rashes  EENT-  euthyroid with out palpable nodules  Neck - supple without lymphadenopathy.  Lungs - clear to auscultation bilaterally.  Heart - regular rate and rhythm without murmur.  Abdomen - soft, nontender, nondistended, no masses or organomegaly noted.  Musculoskeletal - no gross deformities.  Neurological - normal strength, sensation, and mental status.    Breast Exam:  Breast: Without visible skin changes. No dimpling or lesions seen.   Breasts supple, non-tender with palpation, no dominant mass, nodularity, or nipple discharge noted bilaterally. Axillary nodes negative.      Pelvic Exam: exam deferred.      ASSESSMENT:  1. Perimenopausal " vasomotor symptoms  2. Vaginal dryness  - CBC with Platelets; Future  - TSH with free T4 reflex; Future  - Counseled pt that NuvaRIng is a category 3 in the [facility], making risks >benefits for most people given HTN diagnosis.  Pt expressed understanding and agreed to switch to HT. Will do cyclic progesterone as she is perimenopausal. Counseled on benefits, risks, and alternatives. Counseled on use of lubricant for intercourse. May add vaginal estrogen if inadequate relief of vaginal dryness with HT.   - estradiol (VIVELLE-DOT) 0.05 MG/24HR bi-weekly patch; Place 1 patch onto the skin twice a week  Dispense: 24 patch; Refill: 0  - progesterone (PROMETRIUM) 200 MG capsule; Take 1 capsule (200 mg) by mouth daily for 14 days of the month (in a row).  Dispense: 60 capsule; Refill: 1    3. Screening for lipid disorders  - Lipid Profile; Future  - will help to discern risk related to HT and last draw 1 yr ago    4. Screening for thyroid disorder  Follow-up to abnormal thyroid function in May 2023.   - TSH with free T4 reflex; Future    5. Anxiety  - CBC with Platelets; Future  - TSH with free T4 reflex; Future  - Encouraged patient to continue to see therapist and allow a few more weeks to see how she is adjusting to the increase dose of fluoxetine and PRN clonazepam. Continue care at [facility] for mental health and current medications.    6. Encounter for screening mammogram for malignant neoplasm of breast  - MA Screening Digital Bilateral; Future  - due 11/2023    7. Visit for gynecologic examination  - next pap smear due 4/2026   See other screenings recommended above.     Additional teaching done at this visit regarding perimenopause.    Return to clinic in 6-8 weeks for follow-up on hormone therapy and sooner PRN.     I, Jenni Walters, am serving as a scribe; to document services personally performed by  Provider based on data collection and the provider's statements to me.     Jenni Walters DNP Student     I agree with  the PFSH and ROS as completed by the WHNP Student, except for changes made by me.  The remainder of the encounter was performed by me and scribed by the WHNP Student.  The scribed note accurately reflects my personal services and decisions made by me.  Shauna Casillas, DNP, APRN, NP

## 2023-09-28 ENCOUNTER — OFFICE VISIT (OUTPATIENT)
Dept: OBGYN | Facility: CLINIC | Age: 48
End: 2023-09-28
Attending: NURSE PRACTITIONER
Payer: COMMERCIAL

## 2023-09-28 ENCOUNTER — LAB (OUTPATIENT)
Dept: LAB | Facility: CLINIC | Age: 48
End: 2023-09-28
Attending: NURSE PRACTITIONER
Payer: COMMERCIAL

## 2023-09-28 VITALS
BODY MASS INDEX: 27.43 KG/M2 | WEIGHT: 160.7 LBS | DIASTOLIC BLOOD PRESSURE: 85 MMHG | SYSTOLIC BLOOD PRESSURE: 125 MMHG | HEART RATE: 92 BPM | HEIGHT: 64 IN

## 2023-09-28 DIAGNOSIS — F41.9 ANXIETY: ICD-10-CM

## 2023-09-28 DIAGNOSIS — N95.1 PERIMENOPAUSAL VASOMOTOR SYMPTOMS: ICD-10-CM

## 2023-09-28 DIAGNOSIS — Z13.220 SCREENING FOR LIPID DISORDERS: ICD-10-CM

## 2023-09-28 DIAGNOSIS — Z13.29 SCREENING FOR THYROID DISORDER: ICD-10-CM

## 2023-09-28 DIAGNOSIS — Z00.00 VISIT FOR PREVENTIVE HEALTH EXAMINATION: ICD-10-CM

## 2023-09-28 DIAGNOSIS — Z12.31 ENCOUNTER FOR SCREENING MAMMOGRAM FOR MALIGNANT NEOPLASM OF BREAST: ICD-10-CM

## 2023-09-28 DIAGNOSIS — N89.8 VAGINAL DRYNESS: ICD-10-CM

## 2023-09-28 DIAGNOSIS — Z01.419 VISIT FOR GYNECOLOGIC EXAMINATION: Primary | ICD-10-CM

## 2023-09-28 LAB
CHOLEST SERPL-MCNC: 195 MG/DL
ERYTHROCYTE [DISTWIDTH] IN BLOOD BY AUTOMATED COUNT: 11.8 % (ref 10–15)
HCT VFR BLD AUTO: 38.2 % (ref 35–47)
HDLC SERPL-MCNC: 80 MG/DL
HGB BLD-MCNC: 13 G/DL (ref 11.7–15.7)
LDLC SERPL CALC-MCNC: 89 MG/DL
MCH RBC QN AUTO: 30.1 PG (ref 26.5–33)
MCHC RBC AUTO-ENTMCNC: 34 G/DL (ref 31.5–36.5)
MCV RBC AUTO: 88 FL (ref 78–100)
NONHDLC SERPL-MCNC: 115 MG/DL
PLATELET # BLD AUTO: 299 10E3/UL (ref 150–450)
RBC # BLD AUTO: 4.32 10E6/UL (ref 3.8–5.2)
TRIGL SERPL-MCNC: 128 MG/DL
TSH SERPL DL<=0.005 MIU/L-ACNC: 2.66 UIU/ML (ref 0.3–4.2)
WBC # BLD AUTO: 6.7 10E3/UL (ref 4–11)

## 2023-09-28 PROCEDURE — 99214 OFFICE O/P EST MOD 30 MIN: CPT | Performed by: NURSE PRACTITIONER

## 2023-09-28 PROCEDURE — 85027 COMPLETE CBC AUTOMATED: CPT

## 2023-09-28 PROCEDURE — 36415 COLL VENOUS BLD VENIPUNCTURE: CPT

## 2023-09-28 PROCEDURE — 80061 LIPID PANEL: CPT

## 2023-09-28 PROCEDURE — 84443 ASSAY THYROID STIM HORMONE: CPT

## 2023-09-28 PROCEDURE — 99215 OFFICE O/P EST HI 40 MIN: CPT | Performed by: NURSE PRACTITIONER

## 2023-09-28 RX ORDER — LOSARTAN POTASSIUM AND HYDROCHLOROTHIAZIDE 12.5; 5 MG/1; MG/1
1 TABLET ORAL EVERY MORNING
COMMUNITY
Start: 2023-07-10

## 2023-09-28 RX ORDER — FAMOTIDINE 20 MG/1
TABLET, FILM COATED ORAL
COMMUNITY
Start: 2023-09-19 | End: 2023-09-28

## 2023-09-28 ASSESSMENT — ENCOUNTER SYMPTOMS
CHILLS: 0
HALLUCINATIONS: 0
FATIGUE: 1
ABDOMINAL PAIN: 0
DYSPNEA ON EXERTION: 0
HOT FLASHES: 1
FEVER: 0
COUGH: 0
BREAST PAIN: 0
BREAST MASS: 0
DECREASED APPETITE: 0
BLOATING: 0
WEIGHT GAIN: 0
WEIGHT LOSS: 0
NIGHT SWEATS: 1
DIARRHEA: 0
NERVOUS/ANXIOUS: 1
CONSTIPATION: 0

## 2023-09-28 ASSESSMENT — PATIENT HEALTH QUESTIONNAIRE - PHQ9
SUM OF ALL RESPONSES TO PHQ QUESTIONS 1-9: 7
5. POOR APPETITE OR OVEREATING: SEVERAL DAYS

## 2023-09-28 ASSESSMENT — ANXIETY QUESTIONNAIRES
2. NOT BEING ABLE TO STOP OR CONTROL WORRYING: MORE THAN HALF THE DAYS
6. BECOMING EASILY ANNOYED OR IRRITABLE: NOT AT ALL
1. FEELING NERVOUS, ANXIOUS, OR ON EDGE: NEARLY EVERY DAY
GAD7 TOTAL SCORE: 8
5. BEING SO RESTLESS THAT IT IS HARD TO SIT STILL: SEVERAL DAYS
7. FEELING AFRAID AS IF SOMETHING AWFUL MIGHT HAPPEN: NOT AT ALL
GAD7 TOTAL SCORE: 8
3. WORRYING TOO MUCH ABOUT DIFFERENT THINGS: SEVERAL DAYS

## 2023-09-28 NOTE — LETTER
2023       RE: Shauna Galdamez  [address]  [place] [address]     Dear Colleague,    Thank you for referring your patient, Shauna Galdamez, to the [facility] [place] at [facility]. Please see a copy of my visit note below.    Progress Note  SUBJECTIVE:  Shauna Galdamez is an 48 year old, , who requests an Annual Gyn Preventive Exam.   She had a general preventive health visit in 2023 with Roel Campoverde.     Concerns today include:   - Increased anxiety. Seeing a therapist once/week, feels this is helpful. Reports that things that never made her anxious before are now causing anxiety.  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 She was seen at [facility] and her Fluoxetine dose was increased from 40mg to 60mg about 1 week ago. Additionally, she was prescribed clonazepam PRN. She has taken the Clonazepam about 2-3 times with relief (taking 1/2 tablet).   - Increased hot flashes and fatigue. Started on a MILLER to manage these symptoms in 2021; symptoms had been well managed, but now finding that she is having an increase in VMS symptoms and vaginal dryness. Not using lubricant for intercourse. New diagnosis of HTN since she was last seen, managed with medication.   - Follow-up to labs drawn in May when evaluated for chest pain - her TSH was elevated (9.85) and T4 was low (0.89). No follow-up since this time.   - She reports vaginal dryness and asked about lubricants that are recommended for this.     LMP around 2 weeks ago. Menstrual cycle regular and has gotten lighter over the past year.     Last pap smear: 2021 NIL, HPV negative  History of abnormal Pap smear: NO - age 30-65 PAP every 5 years with negative HPV co-testing recommended    Mammogram current: no (patient will schedule)  Last  Mammogram: 11/2022, showed heterogenerously dense tissue    Last Colonoscopy:Coljayro completed 1/7/23    Last lipid profile: 7/29/2022 normal    HISTORY:  desogestrel-ethinyl estradiol (KARIVA) 0.15-0.02/0.01 MG (21/5) tablet, Take 1 tablet by mouth daily  FLUoxetine (PROZAC) 40 MG capsule, Take 1 capsule (40 mg) by mouth daily (Patient taking differently: Take 60 mg by mouth daily)  losartan-hydrochlorothiazide (HYZAAR) 50-12.5 MG tablet, Take 1 tablet by mouth daily  ondansetron (ZOFRAN ODT) 4 MG ODT tab, Take 1 tablet (4 mg) by mouth every 8 hours as needed for nausea (Patient not taking: Reported on 9/28/2023)  ubrogepant (UBRELVY) 50 MG tablet, Take 1-2 tablets ( mg) by mouth at onset of headache (migraine) if needed a second dose may be taken at least 2 hours after the initial dose; MAX, 4 tablets /24 hrs (Patient not taking: Reported on 9/28/2023)  verapamil (CALAN) 40 MG tablet, Take one tab daily for a week than take one tab twice daily if tolerated (Patient not taking: Reported on 9/28/2023)    Botulinum Toxin Type A (BOTOX) 200 units injection 155 Units      Allergies   Allergen Reactions    Sulfa Antibiotics Unknown    Bupropion Rash     Annotation: rash and joint pain       Immunization History   Administered Date(s) Administered    COVID-19 Bivalent 12+ (Pfizer) 09/29/2022    COVID-19 Monovalent 18+ (Moderna) 04/03/2021, 05/01/2021, 11/15/2021    FLU 6-35 months 12/16/2009, 10/19/2012, 10/16/2015    O1w2-86 Novel Flu 12/16/2009    Influenza (H1N1) 12/16/2009    Influenza (IIV3) PF 12/16/2009, 10/15/2010, 01/10/2012, 10/19/2012, 10/30/2015    Influenza Vaccine >6 months (Alfuria,Fluzone) 10/05/2018, 10/03/2019, 11/29/2021, 09/29/2022    Influenza,INJ,MDCK,PF,Quad >6mo(Flucelvax) 09/15/2020    Nasal Influenza Vaccine 2-49 (FluMist) 10/14/2013, 11/18/2014    Rabavert 07/10/2017, 07/13/2017, 07/17/2017, 07/24/2017    TDAP (Adacel,Boostrix) 09/16/2004, 04/18/2014    Td (Adult), Adsorbed 1973,  2004    Tdap (Adult) Unspecified Formulation 1973       OB History    Para Term  AB Living   0 0 0 0 0 0   SAB IAB Ectopic Multiple Live Births   0 0 0 0 0   Obstetric Comments   Has 3 adopted children     Past Medical History:   Diagnosis Date    Anxiety     Cherry angioma 2017    Closed fracture of tibia 2016    Depression     Mixed conductive and sensorineural hearing 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 2021    Added automatically from request for surgery [id number]    Perforation of tympanic membrane, bilateral 2021    Added automatically from request for surgery [id number]    Solar lentiginosis 2017     Past Surgical History:   Procedure Laterality Date    ANKLE SURGERY Bilateral     ORTHOPEDIC SURGERY      PE TUBES      TYMPANOPLASTY Left 2021    Procedure: TYMPANOPLASTY WITH CARTILAGE BACKING LEFT, T TUBE INSERTION;  Surgeon: Brigida Mello MD;  [place] [facility] [place]     Family History   Problem Relation Age of Onset    Skin Cancer Mother         BCC    Mental Illness Mother     Thyroid Disease Mother     Stomach Problem Mother     Hypertension Mother     Substance Abuse Father     No Known Problems Sister     No Known Problems Brother     Heart Disease Maternal Grandmother     No Known Problems Maternal Grandfather     No Known Problems Paternal Grandmother     No Known Problems Other     Melanoma No family hx of      Social History     Socioeconomic History    Marital status:    Tobacco Use    Smoking status: Never    Smokeless tobacco: Never   Vaping Use    Vaping Use: Never used   Substance and Sexual Activity    Alcohol use: Yes     Comment: glass of wine/night    Drug use: No    Sexual activity: Yes     Partners: Male     Birth control/protection: None   Social History Narrative    How much exercise per week? Few walks and yoga    How much calcium per day? Through foods       How much caffeine per day? Two cups coffee    How much vitamin  "D per day? Through foods    Do you/your family wear seatbelts?  Yes    Do you/your family use safety helmets? Yes    Do you/your family use sunscreen? Yes    Do you/your family keep firearms in the home? No    Do you/your family have a smoke detector(s)? Yes                       Review of Systems     Constitutional:  Positive for fatigue and night sweats. Negative for fever, chills, weight loss, weight gain, decreased appetite, recent stressors, height gain, height loss, post-operative complications, incisional pain, hallucinations and hyperactivity.   Respiratory:   Negative for cough and dyspnea on exertion.    Cardiovascular:  Negative for chest pain and dyspnea on exertion.   Gastrointestinal:  Negative for abdominal pain, diarrhea, constipation and bloating.   Genitourinary:  Positive for hot flashes and vaginal dryness. Negative for voiding less frequently.   Psychiatric/Behavioral:  Positive for mood swings. Negative for hallucinations.    Breast:  Negative for breast discharge, breast mass, breast pain and nipple retraction.         10/5/2018    10:17 AM 10/20/2022    11:42 AM 9/28/2023    10:33 AM   PHQ-9 SCORE   PHQ-9 Total Score MyChart  4 (Minimal depression)    PHQ-9 Total Score 3 4 7         10/20/2022    11:42 AM 1/2/2023    10:58 AM 9/28/2023    10:33 AM   SUKHI-7 SCORE   Total Score 2 (minimal anxiety) 0 (minimal anxiety)    Total Score 2 0 8     EXAM:  Blood pressure 125/85, pulse 92, height 1.626 m (5' 4\"), weight 72.9 kg (160 lb 11.2 oz), not currently breastfeeding. Body mass index is 27.58 kg/m .  General - pleasant female in no acute distress.  Skin - no suspicious lesions or rashes  EENT-  euthyroid with out palpable nodules  Neck - supple without lymphadenopathy.  Lungs - clear to auscultation bilaterally.  Heart - regular rate and rhythm without murmur.  Abdomen - soft, nontender, nondistended, no masses or organomegaly noted.  Musculoskeletal - no gross deformities.  Neurological - normal " strength, sensation, and mental status.    Breast Exam:  Breast: Without visible skin changes. No dimpling or lesions seen.   Breasts supple, non-tender with palpation, no dominant mass, nodularity, or nipple discharge noted bilaterally. Axillary nodes negative.      Pelvic Exam: exam deferred.      ASSESSMENT:  1. Perimenopausal vasomotor symptoms  2. Vaginal dryness  - CBC with Platelets; Future  - TSH with free T4 reflex; Future  - Counseled pt that NuvaRIng is a category 3 in the [facility], making risks >benefits for most people given HTN diagnosis.  Pt expressed understanding and agreed to switch to HT. Will do cyclic progesterone as she is perimenopausal. Counseled on benefits, risks, and alternatives. Counseled on use of lubricant for intercourse. May add vaginal estrogen if inadequate relief of vaginal dryness with HT.   - estradiol (VIVELLE-DOT) 0.05 MG/24HR bi-weekly patch; Place 1 patch onto the skin twice a week  Dispense: 24 patch; Refill: 0  - progesterone (PROMETRIUM) 200 MG capsule; Take 1 capsule (200 mg) by mouth daily for 14 days of the month (in a row).  Dispense: 60 capsule; Refill: 1    3. Screening for lipid disorders  - Lipid Profile; Future  - will help to discern risk related to HT and last draw 1 yr ago    4. Screening for thyroid disorder  Follow-up to abnormal thyroid function in May 2023.   - TSH with free T4 reflex; Future    5. Anxiety  - CBC with Platelets; Future  - TSH with free T4 reflex; Future  - Encouraged patient to continue to see therapist and allow a few more weeks to see how she is adjusting to the increase dose of fluoxetine and PRN clonazepam. Continue care at [facility] for mental health and current medications.    6. Encounter for screening mammogram for malignant neoplasm of breast  - MA Screening Digital Bilateral; Future  - due 11/2023    7. Visit for gynecologic examination  - next pap smear due 4/2026   See other screenings recommended above.     Additional teaching  done at this visit regarding perimenopause.    Return to clinic in 6-8 weeks for follow-up on hormone therapy and sooner PRN.     I, Jenni Walters, am serving as a scribe; to document services personally performed by  Provider based on data collection and the provider's statements to me.     Jenni Walters DNP Student     I agree with the PFSH and ROS as completed by the WHNP Student, except for changes made by me.  The remainder of the encounter was performed by me and scribed by the WHNP Student.  The scribed note accurately reflects my personal services and decisions made by me.  Shauna Casillas, DNP, APRN, WHNP

## 2023-09-28 NOTE — PATIENT INSTRUCTIONS
Thank you for trusting us with your care!     If you need to contact us for questions about:  Symptoms, Scheduling & Medical Questions; Non-urgent (2-3 day response) Karley message, Urgent (needing response today) [phone] (if after 3:30pm next day response)   Prescriptions: Please call your Pharmacy   Billing: [facility] [phone] or [facility]:[phone]

## 2023-10-02 RX ORDER — ESTRADIOL 0.05 MG/D
1 PATCH, EXTENDED RELEASE TRANSDERMAL
Qty: 24 PATCH | Refills: 0 | Status: SHIPPED | OUTPATIENT
Start: 2023-10-02 | End: 2024-01-30

## 2023-10-02 RX ORDER — PROGESTERONE 200 MG/1
200 CAPSULE ORAL DAILY
Qty: 60 CAPSULE | Refills: 1 | Status: SHIPPED | OUTPATIENT
Start: 2023-10-02 | End: 2024-01-30

## 2023-10-17 NOTE — PROGRESS NOTES
AUDIOLOGY REPORT    SUBJECTIVE: Shauna Galdamez is a 48 year old female who was seen in the Audiology Clinic at the [facility] [place], on 10/18/23 for a hearing aid consultation and an osseointegrated hearing implant consultation. The patient's hearing was most recently evaluated at [place] on 9/6/2023 and results revealed borderline normal rising to normal hearing sensitivity in the right ear and mild sloping to profound mixed hearing loss in the left ear. The patient has a history of a left vestibular schwannoma, multiple sets of PE tubes, and chronic eardrum perforations related to elevation changes. She was fit with a left ReSound Linx Quattro 7 slimtube BTE with a custom earmold on 9/4/2020, but does not wear it due to physical discomfort of the earmold and TMJ. Shauna was medically evaluated by Brigida Mello MD and determined to be cleared for an osseointegrated device or hearing aids and returns for a consultation today to be counseled on options to help aid in communication. The patient works as a  at the [employer] and struggles to understand students that are seated in the front row and have low voices during class discussions.      OBJECTIVE:   Abuse Screening:  Do you feel unsafe at home or work/school? No  Do you feel threatened by someone? No  Does anyone try to keep you from having contact with others, or doing things outside of your home? No  Physical signs of abuse present? No      The left hearing aid was cleaned and listening check revealed good sound quality.  The left earmold was removed and replaced with a small Phonak power dome.  The patient reported improved comfort, although she tends to experience pain approximately 1 hour after wearing the device.  She is provided additional domes that she may try if desired.  The patient would like to trial and discuss a BAHA prior to deciding to pursue a new left hearing aid or not.      Osseointegrated implant consultation:    A consultation was conducted in which Shauna was presented with osseointegrated device options from Cochlear and [employer]ree[employer]. The appointment was spent outlining and comparing the options available. Shauna was provided a trial of the Cochlear BAHA 6 Max device on a softband in the office. This was accomplished by connecting the outer processor to a headband. When the device is placed on the mastoid bone (located behind the ear) of the poorer ear, bone vibration is used to stimulate the better cochlea.  After performing in situ audiometry through the device, the patient became emotional and is immediately pleased with the ease of listening and sound quality.  MRI compatibility was discussed as patient requires serial imaging to monitor the vestibular schwannoma. The patient is interested in an abutment and a device where she can use a button to manually change volume or programs.  Discussed Bluetooth compatibility with her iPhone.  Shauna mentioned that she may lose all hearing in the left ear. Since her initial audiogram in 2018, the bone-conduction thresholds have shifted 10-15 dB in the left ear over time. A device with higher power will be considered if the bone-conduction thresholds worsen and if appropriate. Her hearing will continue to be monitored for medical management, but it could become possible that the BAHA is no longer appropriate and she will still have the abutment in place. She is encouraged to speak with the surgeon about possible removal of the abutment if necessary.    Warranty information, life expectancy, and billing of the Oticon/Cochlear BAHA were discussed. For the Oticon/Cochlear BAHA, if surgery is approved, Shauna will receive a processor in the surgical kit. This does not include Audiology charges for fitting and follow-up of the device. Audiology will charge the patient a fitting fee and follow-up charges. Shauna expressed  understanding.     A 1 week home trial of the BAHA was offered to the patient utilizing a softband.The patient wished to trial the Cochlear BAHA 6 Max with a softband at home. Patient was loaned the device and will return it to this clinic in one week at which time she will swap out the device to trial an Oticon device for 1 week. Loaner agreement signed.      ASSESSMENT: An osseointegrated hearing implant consultation was conducted today as Shauna has been medically cleared for surgery and to use these devices. Options were presented from Cochlear and [employer]on [employer].      PLAN: The patient will trial the Cochlear BAHA for one week and return to trial the Oticon BAHA for another week. A [employer] message has been sent to Shauna to find a time for her to return. She notes that Tuesdays and Thursdays work best for her.       Delroy Desir, CCC-A  Clinical Audiologist  [place] #[id number]

## 2023-10-18 ENCOUNTER — OFFICE VISIT (OUTPATIENT)
Dept: AUDIOLOGY | Facility: CLINIC | Age: 48
End: 2023-10-18
Payer: COMMERCIAL

## 2023-10-18 DIAGNOSIS — H90.A32 MIXED CONDUCTIVE AND SENSORINEURAL HEARING LOSS OF LEFT EAR WITH RESTRICTED HEARING OF RIGHT EAR: Primary | ICD-10-CM

## 2023-10-18 PROCEDURE — 92590 PR HEARING AID EXAM MONAURAL: CPT | Mod: LT | Performed by: AUDIOLOGIST

## 2023-10-26 ENCOUNTER — OFFICE VISIT (OUTPATIENT)
Dept: AUDIOLOGY | Facility: CLINIC | Age: 48
End: 2023-10-26
Payer: COMMERCIAL

## 2023-10-26 DIAGNOSIS — H90.A32 MIXED CONDUCTIVE AND SENSORINEURAL HEARING LOSS OF LEFT EAR WITH RESTRICTED HEARING OF RIGHT EAR: Primary | ICD-10-CM

## 2023-10-26 PROCEDURE — 99207 PR ASSESSMENT FOR HEARING AID: CPT | Performed by: AUDIOLOGIST

## 2023-10-26 NOTE — PROGRESS NOTES
AUDIOLOGY REPORT    SUBJECTIVE: Shauna Galdamez is a 48 year old female who was seen in the Audiology Clinic at the [facility] [place], on 10/26/23 for a follow-up to their osseointegrated hearing implant consultation. The patient's hearing was most recently evaluated at [place] on 9/6/2023 and results revealed borderline normal rising to normal hearing sensitivity in the right ear and mild sloping to profound mixed hearing loss in the left ear. 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 She was fit with a left ReSound Linx Quattro 7 slimtube BTE with a custom earmold on 9/4/2020, but does not wear it due to physical discomfort of the earmold and TMJ. Shauna was medically evaluated by Brigida Mello MD and determined to be cleared for an osseointegrated device. The patient works as a  at the [employer] and struggles to understand students that are seated in the front row and have low voices during class discussions.  She is here to return the Cochlear BAHA and swap it out for an Oticon BAHA.  Shauna is pleased with the BAHA and reports that it has been life-changing.      OBJECTIVE: Shauna completed 1 week trial of a Cochlear BAHA 6 Max on a soft band.  Loaner hearing aid returned. In-situ bone-conduction programming was performed on an Oticon Ponto 4 using a soft band.  Overall gain was increased approximately 5 clicks.  Briefly discussed that her bone threshold at 2000 Hz is borderline between a standard and power device and is reassured that she can upgrade to a higher power device in the future if necessary.  Shauna is concerned that she will lose all of her hearing and is thus not interested in the [place]ia as the power of the device cannot be increased at a later time.         ASSESSMENT: Patient returns the Cochlear loaner and is provided a 2-week trial of  the Oticon Ponto 4 device.  Loaner agreement signed.  No charge appointment.      PLAN: Shauna will return in 2 weeks to decide which , color, and free accessory she would like to proceed with.  She will return the Oticon loaner at that time.  Please contact this clinic with questions or concerns.        Delroy Desir, CCC-A  Clinical Audiologist  [place] #[id number]

## 2023-11-13 ENCOUNTER — TELEPHONE (OUTPATIENT)
Dept: AUDIOLOGY | Facility: CLINIC | Age: 48
End: 2023-11-13

## 2023-11-13 ENCOUNTER — ANCILLARY PROCEDURE (OUTPATIENT)
Dept: MAMMOGRAPHY | Facility: CLINIC | Age: 48
End: 2023-11-13
Attending: NURSE PRACTITIONER
Payer: COMMERCIAL

## 2023-11-13 DIAGNOSIS — Z12.31 ENCOUNTER FOR SCREENING MAMMOGRAM FOR MALIGNANT NEOPLASM OF BREAST: ICD-10-CM

## 2023-11-13 PROCEDURE — 77063 BREAST TOMOSYNTHESIS BI: CPT | Mod: GC | Performed by: RADIOLOGY

## 2023-11-13 PROCEDURE — 77067 SCR MAMMO BI INCL CAD: CPT | Mod: GC | Performed by: RADIOLOGY

## 2023-12-22 ENCOUNTER — OFFICE VISIT (OUTPATIENT)
Dept: URGENT CARE | Facility: URGENT CARE | Age: 48
End: 2023-12-22
Payer: COMMERCIAL

## 2023-12-22 ENCOUNTER — ANCILLARY PROCEDURE (OUTPATIENT)
Dept: GENERAL RADIOLOGY | Facility: CLINIC | Age: 48
End: 2023-12-22
Attending: EMERGENCY MEDICINE
Payer: COMMERCIAL

## 2023-12-22 VITALS
WEIGHT: 142 LBS | TEMPERATURE: 97.9 F | DIASTOLIC BLOOD PRESSURE: 73 MMHG | OXYGEN SATURATION: 96 % | BODY MASS INDEX: 24.37 KG/M2 | SYSTOLIC BLOOD PRESSURE: 109 MMHG | RESPIRATION RATE: 18 BRPM | HEART RATE: 90 BPM

## 2023-12-22 DIAGNOSIS — J20.9 SUBACUTE BRONCHITIS: ICD-10-CM

## 2023-12-22 DIAGNOSIS — J20.9 SUBACUTE BRONCHITIS: Primary | ICD-10-CM

## 2023-12-22 PROCEDURE — 99214 OFFICE O/P EST MOD 30 MIN: CPT | Performed by: EMERGENCY MEDICINE

## 2023-12-22 PROCEDURE — 71046 X-RAY EXAM CHEST 2 VIEWS: CPT | Mod: TC | Performed by: RADIOLOGY

## 2023-12-22 RX ORDER — METHYLPREDNISOLONE 4 MG
TABLET, DOSE PACK ORAL
Qty: 21 TABLET | Refills: 0 | Status: SHIPPED | OUTPATIENT
Start: 2023-12-22 | End: 2024-04-23

## 2023-12-22 NOTE — PROGRESS NOTES
Assessment & Plan     Diagnosis:    ICD-10-CM    1. Subacute bronchitis  J20.9 XR Chest 2 Views     methylPREDNISolone (MEDROL DOSEPAK) 4 MG tablet therapy pack     DISCONTINUED: budesonide (PULMICORT FLEXHALER) 90 MCG/ACT inhaler     DISCONTINUED: fluticasone (ARNUITY ELLIPTA) 50 MCG/ACT inhaler          Medical Decision Making:  Shauna Galdamez is a 48 year old female who presents for evaluation of ongoing cough 2 months.  Symptoms are consistent with subacute bronchitis.  There is no signs at this point of serious bacterial infection such as OM, RPA, epiglottitis, PTA, strep pharyngitis.    Given duration of symptoms, I did a CXR to eval for pneumonia. This shows no acute infiltrate or effusion on my read. There are no signs of complications of pneumonia at this point such as septic shock, bacteremia, empyema, hypoxia, respiratory failure or compromise.     There are no gastrointestinal symptoms at this point and no signs of dehydration.  Patient will be started on Medrol Dosepak, and medication for antibiotics.  Duration of symptoms at this: From a month recommend that she follow-up with pulmonology.  Close followup with PCP is indicated.  Go to ED for fever > 102 F, shortness of breath, chest pain or other concerns.  Patient verbalized understanding and agreement with the plan was discharged in stable condition.      Ramo Powell PA-C  [facility]    Subjective     Shauna Galdamez is a 48 year old female who presents to clinic today for the following health issues:  Chief Complaint   Patient presents with    Urgent Care    Cough     Chest cold 3rd week of Oct. SOB.        HPI    Patient states for the last 2 months has been experiencing a cough, primarily dry and nonproductive.  It has been constant but waxing waning in severity over the last couple of months, did become worse again 3 days ago.  She did have a chest x-ray at the beginning of December which showed atypical  pneumonia, was placed on antibiotics and a 5-day steroid course. She notes this did help her symptoms, but they are still lingering and again became worse over the last few days.  She denies any fevers, difficulties breathing, wheezing, sore throat, body aches, or other concerns.      Review of Systems    See HPI    Objective      Vitals: /73   Pulse 90   Temp 97.9  F (36.6  C) (Temporal)   Resp 18   Wt 64.4 kg (142 lb)   SpO2 96%   BMI 24.37 kg/m      Patient Vitals for the past 24 hrs:   BP Temp Temp src Pulse Resp SpO2 Weight   12/22/23 1412 109/73 97.9  F (36.6  C) Temporal 90 18 96 % 64.4 kg (142 lb)       Vital signs reviewed by: Ramo Powell PA-C    Physical Exam   Constitutional: Patient is alert and cooperative. No acute distress.  Ears: Right TM is normal. Left TM is normal. External ear canals are normal.  Mouth: Mucous membranes are moist. Normal tongue and tonsil. Posterior oropharynx is clear.  Cardiovascular: Regular rate and rhythm  Pulmonary/Chest: Lungs are clear to auscultation throughout. Effort normal. No respiratory distress. No wheezes, rales or rhonchi  Neurological: Alert and oriented x3  Skin: No rash noted on visualized skin.  Psychiatric:The patient has a normal mood and affect.     Labs/Imaging:  Results for orders placed or performed in visit on 12/22/23   XR Chest 2 Views     Status: None    Narrative    XR CHEST 2 VIEWS  12/22/2023 3:00 PM       INDICATION: Subacute bronchitis  COMPARISON: 4/26/2023       Impression    IMPRESSION: Negative chest.    SORAYA WILLINGHAM MD         SYSTEM ID:  [id number]       Reading per radiology        Ramo Powell PA-C, December 22, 2023

## 2023-12-26 ENCOUNTER — TELEPHONE (OUTPATIENT)
Dept: FAMILY MEDICINE | Facility: CLINIC | Age: 48
End: 2023-12-26

## 2023-12-26 DIAGNOSIS — J20.9 SUBACUTE BRONCHITIS: Primary | ICD-10-CM

## 2023-12-26 RX ORDER — METHYLPREDNISOLONE 4 MG
TABLET, DOSE PACK ORAL
Qty: 21 TABLET | Refills: 0 | Status: SHIPPED | OUTPATIENT
Start: 2023-12-26 | End: 2024-04-23

## 2023-12-26 NOTE — TELEPHONE ENCOUNTER
Medication Question or Refill    Contacts         Type Contact Phone/Fax    12/26/2023 10:37 AM CST Phone (Incoming) Shauna Galdamez (Self) [phone] (M)            What medication are you calling about (include dose and sig)?: methylPREDNISolone (MEDROL DOSEPAK) 4 MG tablet therapy pack     Preferred Pharmacy:   [id number] - [place], [place] - [address] [place] [address]  [address] [place] [address]  Phone: [phone] Fax: [phone]        Controlled Substance Agreement on file:   CSA -- Patient Level:    CSA: None found at the patient level.       Who prescribed the medication?: Provider from [facility]    Do you need a refill? Yes    When did you use the medication last? 12/24/2023    Patient offered an appointment? No    Do you have any questions or concerns?  No      Could we send this information to you in [facility] or would you prefer to receive a phone call?:   Patient would prefer a phone call   Okay to leave a detailed message?: Yes at Cell number on file:    Telephone Information:   Mobile [phone]

## 2023-12-26 NOTE — TELEPHONE ENCOUNTER
Called patient to let them know that medication was sent to [facility]   PRISCILA Goodrich

## 2023-12-29 DIAGNOSIS — N95.1 PERIMENOPAUSAL VASOMOTOR SYMPTOMS: ICD-10-CM

## 2024-01-04 DIAGNOSIS — H90.A32 MIXED CONDUCTIVE AND SENSORINEURAL HEARING LOSS OF LEFT EAR WITH RESTRICTED HEARING OF RIGHT EAR: Primary | ICD-10-CM

## 2024-01-04 RX ORDER — ACETAMINOPHEN 325 MG/1
975 TABLET ORAL ONCE
Status: CANCELLED | OUTPATIENT
Start: 2024-01-04 | End: 2024-01-04

## 2024-01-04 RX ORDER — CEFAZOLIN SODIUM 2 G/50ML
2 SOLUTION INTRAVENOUS SEE ADMIN INSTRUCTIONS
Status: CANCELLED | OUTPATIENT
Start: 2024-01-04

## 2024-01-04 RX ORDER — CEFAZOLIN SODIUM 2 G/50ML
2 SOLUTION INTRAVENOUS
Status: CANCELLED | OUTPATIENT
Start: 2024-01-04

## 2024-01-04 RX ORDER — DEXAMETHASONE SODIUM PHOSPHATE 4 MG/ML
10 INJECTION, SOLUTION INTRA-ARTICULAR; INTRALESIONAL; INTRAMUSCULAR; INTRAVENOUS; SOFT TISSUE ONCE
Status: CANCELLED | OUTPATIENT
Start: 2024-01-04 | End: 2024-01-04

## 2024-01-22 ENCOUNTER — TELEPHONE (OUTPATIENT)
Dept: OTOLARYNGOLOGY | Facility: CLINIC | Age: 49
End: 2024-01-22
Payer: COMMERCIAL

## 2024-01-22 NOTE — TELEPHONE ENCOUNTER
Called patient to schedule INSERTION, BONE ANCHORED HEARING AID WITH THE OTICON DEVICE (Left)  with Dr. Mello. No answer, callback number [phone] left on  for patient.     Jenny Cage on 1/22/2024 at 1:40 PM

## 2024-01-24 NOTE — TELEPHONE ENCOUNTER
Patient called back regarding scheduling surgery with Dr. Mello. Patient said she would like to speak further regarding recovery and restrictions following surgery prior to scheduling so she is able to better confirm time of year that works best for her. Informed patient a message would be sent to Dr. Mello's nurse to further assist patient.     Patient said it is ok for a [employer] message to be sent!    Jenny Cage on 1/24/2024 at 4:11 PM

## 2024-01-24 NOTE — TELEPHONE ENCOUNTER
Called patient to schedule INSERTION, BONE ANCHORED HEARING AID WITH THE OTICON DEVICE (Left) with Dr. Mello. No answer, callback number [phone] left on  for patient.     Jenny Cage on 1/24/2024 at 11:01 AM

## 2024-01-25 ENCOUNTER — MYC MEDICAL ADVICE (OUTPATIENT)
Dept: AUDIOLOGY | Facility: CLINIC | Age: 49
End: 2024-01-25
Payer: COMMERCIAL

## 2024-01-25 NOTE — TELEPHONE ENCOUNTER
Patient is scheduled for surgery with Dr. Mello.     Spoke with: Patient    Date of Surgery: 4/12/2024    Location: [facility] OR     Pre op with Provider: KALIE     H&P: Per patients preference, scheduled for an in clinic visit with PAC on 3/18/24 at 8:15 AM.     Additional imaging/appointments: Patient needs a 1 week post op with Dr. Mello.     Surgery packet: Per patients preference, will send packet through [email or web address] as well as mail. Confirmed with patient address in chart works best. Patient made aware arrival time will not be listed in either packet.      Additional comments: Informed patient a pre op nurse will call 2-5 days prior to surgery to go over further details/give arrival time.         Jenny Cage on 1/25/2024 at 2:40 PM

## 2024-01-26 NOTE — TELEPHONE ENCOUNTER
FUTURE VISIT INFORMATION      SURGERY INFORMATION:  Date: 24  Location:  or  Surgeon:  Brigida Mello MD   Anesthesia Type:  general  Procedure: INSERTION, BONE ANCHORED HEARING AID WITH THE OTICON DEVICE LEFT     RECORDS REQUESTED FROM:       Primary Care Provider: [facility]    Most recent EKG+ Tracin23    Most recent ECHO: 23- [facility]

## 2024-01-29 NOTE — TELEPHONE ENCOUNTER
LVM to schedule BAHA ENT & audiology appts for after surgery. Also informed her that healing time until BAHA processor activation is 3 months (vs 3wks). Gave direct # for scheduling.    -Nuha ROMO 1/29/24

## 2024-01-29 NOTE — PROGRESS NOTES
Subjective:  Shauna Galdamez is a 48 yr old female who requests a telephone visit today to follow-up on perimenopausal HT.    Shauna switched from Nuvaring to estrogen/ progesterone (cyclic) HT 9/2023 due to HTN diagnosis. Overall happy with this regimen.   Shauna is happy with this change. Hot flashes are pretty much relieved. Not waking up at night with night sweats. Sometimes still waking at night. Able to get back to sleep, and previously had some trouble with this.  Vaginal dryness is not problematic; using lubricant for sexual activity. No need for contraception due to sperm issue with .    Anxiety is present, but much better.   No change in fatigue.   No SE with this therapy.     Menstrual periods:   Periods are lighter than they used to be and more spaced apart. Experiences them about every 2 months, only during the progesterone-free part of the month. Denies AUB.     Mammogram: 11/2023 normal    BP was normal at ED visit in December 2023.     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 7/9/2021    Added automatically from request for surgery [id number]    Perforation of tympanic membrane, bilateral 7/9/2021    Added automatically from request for surgery [id number]    Solar lentiginosis 2/6/2017     Past Surgical History:   Procedure Laterality Date    ANKLE SURGERY Bilateral     ORTHOPEDIC SURGERY      PE TUBES      TYMPANOPLASTY Left 8/11/2021    Procedure: TYMPANOPLASTY WITH CARTILAGE BACKING LEFT, T TUBE INSERTION;  Surgeon: Brigida Mello MD;  Location: [facility] OR     Family History   Problem Relation Age of Onset    Skin Cancer Mother         BCC    Mental Illness Mother      Thyroid Disease Mother     Stomach Problem Mother     Hypertension Mother     Substance Abuse Father     No Known Problems Sister     No Known Problems Brother     Heart Disease Maternal Grandmother     No Known Problems Maternal Grandfather     No Known Problems Paternal Grandmother     No Known Problems Other     Melanoma No family hx of      Allergies   Allergen Reactions    Sulfa Antibiotics Unknown    Bupropion Rash     Annotation: rash and joint pain       Current Outpatient Medications   Medication    [START ON 2/1/2024] estradiol (VIVELLE-DOT) 0.05 MG/24HR bi-weekly patch    FLUoxetine (PROZAC) 40 MG capsule    losartan-hydrochlorothiazide (HYZAAR) 50-12.5 MG tablet    methylPREDNISolone (MEDROL DOSEPAK) 4 MG tablet therapy pack    methylPREDNISolone (MEDROL DOSEPAK) 4 MG tablet therapy pack    progesterone (PROMETRIUM) 200 MG capsule    ondansetron (ZOFRAN ODT) 4 MG ODT tab    ubrogepant (UBRELVY) 50 MG tablet    verapamil (CALAN) 40 MG tablet     Current Facility-Administered Medications   Medication    Botulinum Toxin Type A (BOTOX) 200 units injection 155 Units     Objective:  No VS taken due to telephone visit  Respiratory: able to talk in complete sentences  Psych: well oriented, normal mentation    Assessment/ Plan:  1. Perimenopausal vasomotor symptoms  - Continue current regiment of cyclic progesterone and bi-weekly estradiol patch  - progesterone (PROMETRIUM) 200 MG capsule; Take 1 capsule (200 mg) by mouth daily for 14 days of the month (in a row).  Dispense: 90 capsule; Refill: 1  - estradiol (VIVELLE-DOT) 0.05 MG/24HR bi-weekly patch; Place 1 patch onto the skin twice a week  Dispense: 24 patch; Refill: 2    Follow-up in 1 yr or sooner PRN.    Call 8:55am - 9:02am   Additional 7 minutes was spent in chart review and documentation on the DOS.       Shauna Casillas, DNP, APRN, WHNP

## 2024-01-29 NOTE — TELEPHONE ENCOUNTER
Spoke to Shuana to schedule appts. She did not have questions regarding processor fitting time of 3mos post surgery, did ask if there were any cancellations, to consider her to spot. I said surgery schedulers would reach out if possibility became available and would coordinate with me to change appt dates, but she could call me with questions if needed in future.    -Nuha ROMO 1/29/24

## 2024-01-30 ENCOUNTER — VIRTUAL VISIT (OUTPATIENT)
Dept: OBGYN | Facility: CLINIC | Age: 49
End: 2024-01-30
Attending: NURSE PRACTITIONER
Payer: COMMERCIAL

## 2024-01-30 DIAGNOSIS — N95.1 PERIMENOPAUSAL VASOMOTOR SYMPTOMS: ICD-10-CM

## 2024-01-30 PROCEDURE — 99441 PR PHYSICIAN TELEPHONE EVALUATION 5-10 MIN: CPT | Mod: 93 | Performed by: NURSE PRACTITIONER

## 2024-01-30 RX ORDER — ESTRADIOL 0.05 MG/D
1 PATCH, EXTENDED RELEASE TRANSDERMAL
Qty: 24 PATCH | Refills: 2 | Status: SHIPPED | OUTPATIENT
Start: 2024-02-01 | End: 2024-10-03

## 2024-01-30 RX ORDER — PROGESTERONE 200 MG/1
200 CAPSULE ORAL DAILY
Qty: 90 CAPSULE | Refills: 1 | Status: SHIPPED | OUTPATIENT
Start: 2024-01-30 | End: 2024-10-03

## 2024-01-30 NOTE — LETTER
1/30/2024       RE: Shauna Galdamez  [address]  [place] [address]     Dear Colleague,    Thank you for referring your patient, Shauna Galdamez, to the [facility] [place] [facility]. Please see a copy of my visit note below.    Subjective:  Shauna Galdamez is a 48 yr old female who requests a telephone visit today to follow-up on perimenopausal HT.    Shauna switched from Nuvaring to estrogen/ progesterone (cyclic) HT 9/2023 due to HTN diagnosis. Overall happy with this regimen.   Shauna is happy with this change. Hot flashes are pretty much relieved. Not waking up at night with night sweats. Sometimes still waking at night. Able to get back to sleep, and previously had some trouble with this.  Vaginal dryness is not problematic; using lubricant for sexual activity. No need for contraception due to sperm issue with .    Anxiety is present, but much better.   No change in fatigue.   No SE with this therapy.     Menstrual periods:   Periods are lighter than they used to be and more spaced apart. Experiences them about every 2 months, only during the progesterone-free part of the month. Denies AUB.     Mammogram: 11/2023 normal    BP was normal at ED visit in December 2023.     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 7/9/2021    Added automatically from request for surgery [id number]    Perforation of tympanic membrane, bilateral 7/9/2021    Added automatically from request for surgery [id number]    Solar lentiginosis 2/6/2017     Past Surgical History:   Procedure Laterality Date     ANKLE SURGERY Bilateral     ORTHOPEDIC SURGERY      PE TUBES      TYMPANOPLASTY Left 8/11/2021    Procedure: TYMPANOPLASTY WITH CARTILAGE BACKING LEFT, T TUBE INSERTION;  Surgeon: Brigida Mello MD;  Location: [facility] OR     Family History   Problem Relation Age of Onset    Skin Cancer Mother         BCC    Mental Illness Mother     Thyroid Disease Mother     Stomach Problem Mother     Hypertension Mother     Substance Abuse Father     No Known Problems Sister     No Known Problems Brother     Heart Disease Maternal Grandmother     No Known Problems Maternal Grandfather     No Known Problems Paternal Grandmother     No Known Problems Other     Melanoma No family hx of      Allergies   Allergen Reactions    Sulfa Antibiotics Unknown    Bupropion Rash     Annotation: rash and joint pain       Current Outpatient Medications   Medication    [START ON 2/1/2024] estradiol (VIVELLE-DOT) 0.05 MG/24HR bi-weekly patch    FLUoxetine (PROZAC) 40 MG capsule    losartan-hydrochlorothiazide (HYZAAR) 50-12.5 MG tablet    methylPREDNISolone (MEDROL DOSEPAK) 4 MG tablet therapy pack    methylPREDNISolone (MEDROL DOSEPAK) 4 MG tablet therapy pack    progesterone (PROMETRIUM) 200 MG capsule    ondansetron (ZOFRAN ODT) 4 MG ODT tab    ubrogepant (UBRELVY) 50 MG tablet    verapamil (CALAN) 40 MG tablet     Current Facility-Administered Medications   Medication    Botulinum Toxin Type A (BOTOX) 200 units injection 155 Units     Objective:  No VS taken due to telephone visit  Respiratory: able to talk in complete sentences  Psych: well oriented, normal mentation    Assessment/ Plan:  1. Perimenopausal vasomotor symptoms  - Continue current regiment of cyclic progesterone and bi-weekly estradiol patch  - progesterone (PROMETRIUM) 200 MG capsule; Take 1 capsule (200 mg) by mouth daily for 14 days of the month (in a row).  Dispense: 90 capsule; Refill: 1  - estradiol (VIVELLE-DOT) 0.05 MG/24HR bi-weekly patch; Place 1 patch onto the skin twice a  week  Dispense: 24 patch; Refill: 2    Follow-up in 1 yr or sooner PRN.    Call 8:55am - 9:02am   Additional 7 minutes was spent in chart review and documentation on the DOS.       Shauna Casillas, DNP, APRN, WHNP

## 2024-01-30 NOTE — NURSING NOTE
Chief Complaint   Patient presents with    RECHECK       
Left message to update chart for video visit  
Him/He

## 2024-01-31 NOTE — TELEPHONE ENCOUNTER
External device selection for 4/12/24 BAHA surgery with Brigida Mello MD:     Left  OTICON Ponto 5 mini   Color: Black  [facility] piedad    -Nuha ROMO 1/31/24 (Per Delroy Desir  11/10/23)

## 2024-02-04 ENCOUNTER — HEALTH MAINTENANCE LETTER (OUTPATIENT)
Age: 49
End: 2024-02-04

## 2024-02-06 ENCOUNTER — MYC MEDICAL ADVICE (OUTPATIENT)
Dept: OBGYN | Facility: CLINIC | Age: 49
End: 2024-02-06
Payer: COMMERCIAL

## 2024-02-06 NOTE — TELEPHONE ENCOUNTER
Received message from [facility] stating that pt's estradiol 0.05mg patch (2/wk) are on backorder and requesting we sent the prescription to another pharmacy. Sent [facility] message to pt to see what alternative pharmacy she would prefer.

## 2024-02-28 ENCOUNTER — TELEPHONE (OUTPATIENT)
Dept: OTOLARYNGOLOGY | Facility: CLINIC | Age: 49
End: 2024-02-28
Payer: COMMERCIAL

## 2024-02-28 NOTE — TELEPHONE ENCOUNTER
Rescheduled surgery with Dr. Mello from 4/12/2024 to 5/7/2024    Spoke with: Patient    Reason for reschedule: Patient preference per Nuha    Surgery is located at [facility]    Patient will be seen for their new H&P by PAC on 4/23/20024 at 10am - Provided address & floor number. Patient is aware that they will receive their start time for surgery at this appointment    Is there imaging that needs to be rescheduled for new surgery date? No    Patients 1 week post op needs rescheduled    Additional comments: Patient will call back if they have any questions or concerns.    Tasha Cloud on 2/28/2024 at 2:47 PM

## 2024-02-28 NOTE — TELEPHONE ENCOUNTER
"Patient called saying that they, \"weren't 100% sure but might want to switch their BAHA surgery date into May\". I said I was not in charge of surgery scheduling but I would pass the message to that team and they'd reach out.\"  "

## 2024-03-06 NOTE — TELEPHONE ENCOUNTER
External device selection for 5/7/24 BAHA surgery with Brigida Mello MD:     Left  OTICON Ponto 5 mini   Color: Black  Remote [facility] piedad    -Nuha ROMO 1/31/24 (Per Delroy Desir  11/10/23)

## 2024-03-18 ENCOUNTER — PRE VISIT (OUTPATIENT)
Dept: SURGERY | Facility: CLINIC | Age: 49
End: 2024-03-18

## 2024-03-28 RX ORDER — ESTRADIOL 0.05 MG/D
PATCH, EXTENDED RELEASE TRANSDERMAL
Qty: 24 PATCH | Refills: 0 | OUTPATIENT
Start: 2024-03-28

## 2024-04-10 RX ORDER — PROGESTERONE 100 MG/1
CAPSULE ORAL
Qty: 120 CAPSULE | Refills: 7 | OUTPATIENT
Start: 2024-04-10

## 2024-04-23 ENCOUNTER — OFFICE VISIT (OUTPATIENT)
Dept: SURGERY | Facility: CLINIC | Age: 49
End: 2024-04-23
Payer: COMMERCIAL

## 2024-04-23 ENCOUNTER — PRE VISIT (OUTPATIENT)
Dept: SURGERY | Facility: CLINIC | Age: 49
End: 2024-04-23

## 2024-04-23 ENCOUNTER — LAB (OUTPATIENT)
Dept: LAB | Facility: CLINIC | Age: 49
End: 2024-04-23
Payer: COMMERCIAL

## 2024-04-23 ENCOUNTER — ANESTHESIA EVENT (OUTPATIENT)
Dept: SURGERY | Facility: AMBULATORY SURGERY CENTER | Age: 49
End: 2024-04-23
Payer: COMMERCIAL

## 2024-04-23 VITALS
OXYGEN SATURATION: 97 % | SYSTOLIC BLOOD PRESSURE: 96 MMHG | RESPIRATION RATE: 16 BRPM | DIASTOLIC BLOOD PRESSURE: 68 MMHG | HEART RATE: 80 BPM | TEMPERATURE: 97.5 F | HEIGHT: 64 IN | BODY MASS INDEX: 23.1 KG/M2 | WEIGHT: 135.3 LBS

## 2024-04-23 DIAGNOSIS — Z01.818 PRE-OP EVALUATION: ICD-10-CM

## 2024-04-23 DIAGNOSIS — Z01.818 PRE-OP EVALUATION: Primary | ICD-10-CM

## 2024-04-23 LAB
ANION GAP SERPL CALCULATED.3IONS-SCNC: 8 MMOL/L (ref 7–15)
BUN SERPL-MCNC: 12.3 MG/DL (ref 6–20)
CALCIUM SERPL-MCNC: 8.8 MG/DL (ref 8.6–10)
CHLORIDE SERPL-SCNC: 101 MMOL/L (ref 98–107)
CREAT SERPL-MCNC: 0.81 MG/DL (ref 0.51–0.95)
DEPRECATED HCO3 PLAS-SCNC: 27 MMOL/L (ref 22–29)
EGFRCR SERPLBLD CKD-EPI 2021: 88 ML/MIN/1.73M2
ERYTHROCYTE [DISTWIDTH] IN BLOOD BY AUTOMATED COUNT: 11.9 % (ref 10–15)
GLUCOSE SERPL-MCNC: 78 MG/DL (ref 70–99)
HCT VFR BLD AUTO: 38.5 % (ref 35–47)
HGB BLD-MCNC: 12.9 G/DL (ref 11.7–15.7)
MCH RBC QN AUTO: 30.4 PG (ref 26.5–33)
MCHC RBC AUTO-ENTMCNC: 33.5 G/DL (ref 31.5–36.5)
MCV RBC AUTO: 91 FL (ref 78–100)
PLATELET # BLD AUTO: 285 10E3/UL (ref 150–450)
POTASSIUM SERPL-SCNC: 3.9 MMOL/L (ref 3.4–5.3)
RBC # BLD AUTO: 4.25 10E6/UL (ref 3.8–5.2)
SODIUM SERPL-SCNC: 136 MMOL/L (ref 135–145)
WBC # BLD AUTO: 5.7 10E3/UL (ref 4–11)

## 2024-04-23 PROCEDURE — 80048 BASIC METABOLIC PNL TOTAL CA: CPT | Performed by: PATHOLOGY

## 2024-04-23 PROCEDURE — 85027 COMPLETE CBC AUTOMATED: CPT | Performed by: PATHOLOGY

## 2024-04-23 PROCEDURE — 99213 OFFICE O/P EST LOW 20 MIN: CPT | Performed by: PHYSICIAN ASSISTANT

## 2024-04-23 PROCEDURE — 36415 COLL VENOUS BLD VENIPUNCTURE: CPT | Performed by: PATHOLOGY

## 2024-04-23 RX ORDER — CLONAZEPAM 0.5 MG/1
TABLET ORAL
COMMUNITY
Start: 2024-02-08

## 2024-04-23 ASSESSMENT — PAIN SCALES - GENERAL: PAINLEVEL: NO PAIN (0)

## 2024-04-23 ASSESSMENT — ENCOUNTER SYMPTOMS: SEIZURES: 0

## 2024-04-23 ASSESSMENT — LIFESTYLE VARIABLES: TOBACCO_USE: 0

## 2024-04-23 NOTE — PATIENT INSTRUCTIONS
Preparing for Your Surgery      Name:  Shauna Galdamez   MRN:  [id number]   :  1975   Today's Date:  2024         Arriving for surgery:  Surgery date:  24  Arrival time:  8.25AM    Restrictions due to COVID 19:    Please maintain social distance.  Masking is optional.      parking is available for anyone with mobility limitations or disabilities. (Monday- Friday 7 am- 5 pm)    Please come to:    [facility]  [address] [place] [id number]    Please check in on the 5th floor at the Ambulatory Surgery Center.      What can I eat or drink?    -  You may eat and drink normally until 8 hours prior to arrival  time. (Until 12.25AM)  -  You may have clear liquids until 2 hours prior to arrival  time. (Until 6.25AM)    Examples of clear liquids:  Water  Clear broth  Juices (apple, white grape, white cranberry  and cider) without pulp  Noncarbonated, powder based beverages  (lemonade and Piotr-Aid)  Sodas (Sprite, 7-Up, ginger ale and seltzer)  Coffee or tea (without milk or cream)  Gatorade      Which medicines can I take?    Hold Aspirin for 7 days before surgery.   Hold Multivitamins for 7 days before surgery.  Hold Supplements for 7 days before surgery.  Hold Ibuprofen (Advil, Motrin) for 1 day before surgery--unless otherwise directed by surgeon.  Hold Naproxen (Aleve) for 4 days before surgery.    No alcohol or cannabis products for 24 hours prior to procedure.      -  DO NOT take the following medications the day of surgery:  Losartan-hydrochlorothiazide (Hyzaar)    -  PLEASE TAKE the following medications the day of surgery:   Morning medications per usual except for medications listed above.      How do I prepare myself?  - Please take 2 showers (one the night prior to surgery and one the morning of surgery) using Scrubcare or Hibiclens soap.    Use this soap only from the neck to your toes.     Leave the soap on your skin for one minute--then rinse  thoroughly.      You may use your own shampoo and conditioner. No other hair products.   - Please remove all jewelry and body piercings.  - No lotions, deodorants or fragrance.  - No makeup or fingernail polish.   - Bring your ID and insurance card.    -If you have a Deep Brain Stimulator, a Spinal Cord Stimulator, or any implanted Neuro Device, you must bring the remote to the Surgery Center.         ALL PATIENTS ARE REQUIRED TO HAVE A RESPONSIBLE ADULT TO DRIVE AND BE IN ATTENDANCE WITH THEM FOR 24 HOURS FOLLOWING SURGERY.     Covid testing policy as of 12/06/2022  Your surgeon will notify and schedule you for a COVID test if one is needed before surgery--please direct any questions or COVID symptoms to your surgeon      Questions or Concerns:    -For questions regarding the day of surgery, please contact the Ambulatory Surgery Center at [phone].    -If you have health changes between today and your surgery, please contact your surgeon.     - For questions after surgery, please contact your surgeon's office.

## 2024-04-23 NOTE — H&P
Pre-Operative H & P     CC:  Preoperative exam to assess for increased cardiopulmonary risk while undergoing surgery and anesthesia.    Date of Encounter: 4/23/2024  Primary Care Physician:  Brigida Aldrich     Reason for visit:   Encounter Diagnosis   Name Primary?    Pre-op evaluation Yes       HPI  Shauna Galdamez is a 48 year old female who presents for pre-operative H & P in preparation for  Procedure Information       Case: [id number] Date/Time: 05/07/24 0955    Procedure: INSERTION, BONE ANCHORED HEARING AID WITH THE OTICON DEVICE LEFT (Left: Ear)    Anesthesia type: General    Diagnosis: Mixed conductive and sensorineural hearing 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 [H90.A32]    Pre-op diagnosis: Mixed conductive and sensorineural hearing 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 [H90.A32]    Location: [place] [address] / [facility]    Providers: Brigida Mello MD            Patient is being evaluated for comorbid conditions of depression, anxiety, acoustic neuroma, hypertension     Ms. Galdamez has a history of a left tympanoplasty with cartilage packing, left myringotomy with left T-tube placement.  She has a history of TMJ pain so she is unable to tolerate the placement of anything in the ear canal.  She was seen by Dr. Mello last spring to discuss treatment.  She is now scheduled for the above procedure.    History is obtained from the patient and chart review    Hx of abnormal bleeding or anti-platelet use: denies    Menstrual history: Patient's last menstrual period was 03/29/2024 (approximate).     Past Medical History  Past Medical History:   Diagnosis Date    Anxiety     Cherry angioma 2/6/2017    Closed fracture of tibia 1/13/2016    Depression     Mixed conductive and sensorineural hearing 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 7/9/2021    Added automatically from request for surgery [id number]     Perforation of tympanic membrane, bilateral 7/9/2021    Added automatically from request for surgery [id number]    Solar lentiginosis 2/6/2017       Past Surgical History  Past Surgical History:   Procedure Laterality Date    ANKLE SURGERY Bilateral     ORTHOPEDIC SURGERY      PE TUBES      TYMPANOPLASTY Left 8/11/2021    Procedure: TYMPANOPLASTY WITH CARTILAGE BACKING LEFT, T TUBE INSERTION;  Surgeon: Brigida Mello MD;  [place] [facility]       Prior to Admission Medications  Current Outpatient Medications   Medication Sig Dispense Refill    clonazePAM (KLONOPIN) 0.5 MG tablet TAKE 1/2 TO 1 TABLET BY MOUTH 2-3 TIMES DAILY AS NEEDED FOR ANXIETY/PANIC      estradiol (VIVELLE-DOT) 0.05 MG/24HR bi-weekly patch Place 1 patch onto the skin twice a week 24 patch 2    FLUoxetine (PROZAC) 40 MG capsule Take 1 capsule (40 mg) by mouth daily (Patient taking differently: Take 60 mg by mouth every morning) 90 capsule 3    losartan-hydrochlorothiazide (HYZAAR) 50-12.5 MG tablet Take 1 tablet by mouth every morning      ondansetron (ZOFRAN ODT) 4 MG ODT tab Take 1 tablet (4 mg) by mouth every 8 hours as needed for nausea 20 tablet 3    progesterone (PROMETRIUM) 200 MG capsule Take 1 capsule (200 mg) by mouth daily for 14 days of the month (in a row). 90 capsule 1       Allergies  Allergies   Allergen Reactions    Sulfa Antibiotics Unknown    Bupropion Rash     Annotation: rash and joint pain         Social History  Social History     Socioeconomic History    Marital status:      Spouse name: Not on file    Number of children: Not on file    Years of education: Not on file    Highest education level: Not on file   Occupational History    Not on file   Tobacco Use    Smoking status: Never    Smokeless tobacco: Never   Vaping Use    Vaping status: Never Used   Substance and Sexual Activity    Alcohol use: Yes     Comment: glass of wine/night    Drug use: No    Sexual activity: Yes     Partners: Male     Birth control/protection:  None   Other Topics Concern    Parent/sibling w/ CABG, MI or angioplasty before 65F 55M? Not Asked   Social History Narrative    How much exercise per week? Few walks and yoga    How much calcium per day? Through foods       How much caffeine per day? Two cups coffee    How much vitamin D per day? Through foods    Do you/your family wear seatbelts?  Yes    Do you/your family use safety helmets? Yes    Do you/your family use sunscreen? Yes    Do you/your family keep firearms in the home? No    Do you/your family have a smoke detector(s)? Yes                     Social Determinants of Health     Financial Resource Strain: Low Risk  (9/5/2023)    Received from [facility]    Overall Financial Resource Strain (CARDIA)     Difficulty of Paying Living Expenses: Not very hard   Food Insecurity: No Food Insecurity (9/5/2023)    Received from [facility]    Hunger Vital Sign     Worried About Running Out of Food in the Last Year: Never true     Ran Out of Food in the Last Year: Never true   Transportation Needs: No Transportation Needs (9/5/2023)    Received from [facility]    PRAPARE - Transportation     Lack of Transportation (Medical): No     Lack of Transportation (Non-Medical): No   Physical Activity: Insufficiently Active (9/5/2023)    Received from [facility]    Exercise Vital Sign     Days of Exercise per Week: 2 days     Minutes of Exercise per Session: 50 min   Stress: Not on file   Social Connections: Unknown (5/3/2023)    Received from [facility]    Social Connections     Frequency of Communication with Friends and Family: Not on file   Interpersonal Safety: Not on file   Housing Stability: Low Risk  (9/5/2023)    Received from [facility]    Housing Stability     What is your living situation today?: I have a steady place to live       Family History  Family History    Problem Relation Age of Onset    Skin Cancer Mother         BCC    Mental Illness Mother     Thyroid Disease Mother     Stomach Problem Mother     Hypertension Mother     Substance Abuse Father     No Known Problems Sister     No Known Problems Brother     Heart Disease Maternal Grandmother     No Known Problems Maternal Grandfather     No Known Problems Paternal Grandmother     No Known Problems Other     Melanoma No family hx of     Anesthesia Reaction No family hx of     Deep Vein Thrombosis (DVT) No family hx of        Review of Systems  The complete review of systems is negative other than noted in the HPI or here.   Anesthesia Evaluation   Pt has had prior anesthetic.     No history of anesthetic complications       ROS/MED HX  ENT/Pulmonary: Comment: Mixed sensorineural and conductive hearing loss    (-) tobacco use   Neurologic:    (-) no seizures and no CVA   Cardiovascular:     (+)  hypertension- -   -  - -                                 Previous cardiac testing   Echo: Date: 5/2023 Results:  Final Conclusion    1. Normal left ventricular chamber size and systolic function. Estimated left ventricular   ejection fraction is 60-65%.         No regional wall motion abnormalities. Normal left ventricular wall thickness.    2.Normal right ventricular size and systolic function.    3.Mild Mitral valve posterior leaflet prolapse.Trivial mitral valve regurgitation.    4.No pericardial effusion.    5.Upper normal indexed ascending aorta dimension (3.7 cm, 2.1 cm/m ).   Stress Test:  Date: 5/2023 Results:   FINAL CONCLUSIONS    1-The perfusion images were probably normal with diaphragm/soft tissue attenuation artifact,   which may result in a decreased    sensitivity to exclude coronary artery disease.    2-Normal left ventricular wall motion.    3-Normal left ventricular size and systolic function with a calculated LVEF of 69%.    4-Stress ECG is negative for myocardial ischemia. Patient developed rate related  "LBBB peak   exercise at 151 bpm which improved at 1    minutes 16 seconds into recovery at heart rate 113 bpm. Rare APCs, Occasional PVCs noted with   stress.    4-Exercise produced expected fatigue.  No chest pain noted with exercise.     ECG Reviewed:  Date: 5/2023 Results:  Normal sinus rhythm   Possible Anterior infarct , age undetermined     Cath:  Date: Results:   (-) taking anticoagulants/antiplatelets   METS/Exercise Tolerance: >4 METS Comment: Walking 20 minutes at a time without exertional symptoms, orange theory classes   Hematologic:  - neg hematologic  ROS  (-) history of blood clots and history of blood transfusion   Musculoskeletal:  - neg musculoskeletal ROS     GI/Hepatic:  - neg GI/hepatic ROS  (-) GERD   Renal/Genitourinary:  - neg Renal ROS     Endo:       Psychiatric/Substance Use:  - neg psychiatric ROS     Infectious Disease:  - neg infectious disease ROS     Malignancy:  - neg malignancy ROS     Other:            BP 96/68 (BP Location: Right arm, Patient Position: Sitting, Cuff Size: Adult Regular)   Pulse 80   Temp 97.5  F (36.4  C) (Oral)   Resp 16   Ht 1.626 m (5' 4\")   Wt 61.4 kg (135 lb 4.8 oz)   LMP 03/29/2024 (Approximate)   SpO2 97%   Breastfeeding No   BMI 23.22 kg/m      Physical Exam   Constitutional: Awake, alert, cooperative, no apparent distress, and appears stated age.  Eyes: Pupils equal, round and reactive to light, extra ocular muscles intact, sclera clear, conjunctiva normal.  HENT: Normocephalic, oral pharynx with moist mucus membranes, good dentition.  Respiratory: Clear to auscultation bilaterally, no crackles or wheezing.  Cardiovascular: Regular rate and rhythm, normal S1 and S2, and no murmur noted.  Carotids no bruits. No edema. Palpable pulses to radial arteries.   GI: Normal bowel sounds, soft, non-distended, non-tender  Genitourinary:  deferred  Skin: Warm and dry.   Musculoskeletal: Full ROM of neck. There is no redness, warmth, or swelling of the " exposed joints. Gross motor strength is normal.    Neurologic: Awake, alert, oriented to name, place and time. Cranial nerves II-XII are grossly intact. Gait is normal.   Neuropsychiatric: Calm, cooperative. Normal affect.     Prior Labs/Diagnostic Studies   All labs and imaging personally reviewed     EKG/ stress test - if available please see in ROS above   No results found.       No data to display                The patient's records and results personally reviewed by this provider.     Outside records reviewed from: Care Everywhere    LAB/DIAGNOSTIC STUDIES TODAY:  CBC, BMP    Assessment    Shauna Galdamez is a 48 year old female seen as a PAC referral for risk assessment and optimization for anesthesia.    Plan/Recommendations  Pt will be optimized for the proposed procedure.  See below for details on the assessment, risk, and preoperative recommendations    NEUROLOGY  - No history of TIA, CVA or seizure  -acoustic neuroma   -Post Op delirium risk factors:  No risk identified    ENT  -mixed conductive and sensorineural hearing loss. Above procedure planned   - No current airway concerns.  Will need to be reassessed day of surgery.  Mallampati: II  TM: > 3    CARDIAC  - No history of CAD and Afib  -hypertension using hyzaar  -denies cardiac symptoms   -some CP last year- stress test negative for ischemia, echo overall normal.  Patient reports ultimately symptoms were determined to be due to stress related GERD symptoms (have since improved).   - METS (Metabolic Equivalents)  Patient performs 4 or more METS exercise without symptoms             Total Score: 0      RCRI-Very low risk: Class 1 0.4% complication rate             Total Score: 0        PULMONARY  ALFONSO Low Risk             Total Score: 0      - Denies asthma or inhaler use  - Tobacco History    History   Smoking Status    Never   Smokeless Tobacco    Never       GI  PONV High Risk  Total Score: 3           1 AN PONV: Pt is Female    1 AN PONV:  "Patient is not a current smoker    1 AN PONV: Intended Post Op Opioids        /RENAL  - Baseline Creatinine WNL, will update prior to surgery     ENDOCRINE    - BMI: Estimated body mass index is 23.22 kg/m  as calculated from the following:    Height as of this encounter: 1.626 m (5' 4\").    Weight as of this encounter: 61.4 kg (135 lb 4.8 oz).  Healthy Weight (BMI 18.5-24.9)  - No history of Diabetes Mellitus    HEME  VTE Low Risk 0.26%             Total Score: 0      - No history of abnormal bleeding or antiplatelet use.  -will update CBC prior to surgery     ANESTHESIA  -patient has had anesthesia in the past without issue. Of note, she reports her cousin  during a procedure due to the \"machine being calibrated incorrectly\" and \"user error.\" She understandably has anxiety around anesthesia due to this family history, but reports the anxiety continues to improve (unless her children are undergoing anesthesia).  She declines discussing further with an anesthesiologist today.     Different anesthesia methods/types have been discussed with the patient, but they are aware that the final plan will be decided by the assigned anesthesia provider on the date of service.    The patient is optimized for their procedure. AVS with information on surgery time/arrival time, meds and NPO status given by nursing staff. No further diagnostic testing indicated.      On the day of service:     Prep time: 11 minutes  Visit time: 10 minutes  Documentation time: 6 minutes  ------------------------------------------  Total time: 27 minutes      Corrine Pelayo PA-C  Preoperative Assessment Center  [facility]  Phone: [phone]  Fax: [phone]    "

## 2024-05-07 ENCOUNTER — HOSPITAL ENCOUNTER (OUTPATIENT)
Facility: AMBULATORY SURGERY CENTER | Age: 49
Discharge: HOME | End: 2024-05-07
Attending: OTOLARYNGOLOGY
Payer: COMMERCIAL

## 2024-05-07 ENCOUNTER — ANESTHESIA (OUTPATIENT)
Dept: SURGERY | Facility: AMBULATORY SURGERY CENTER | Age: 49
End: 2024-05-07
Payer: COMMERCIAL

## 2024-05-07 VITALS
RESPIRATION RATE: 16 BRPM | SYSTOLIC BLOOD PRESSURE: 98 MMHG | BODY MASS INDEX: 23.05 KG/M2 | DIASTOLIC BLOOD PRESSURE: 62 MMHG | HEIGHT: 64 IN | WEIGHT: 135 LBS | HEART RATE: 70 BPM | TEMPERATURE: 98 F | OXYGEN SATURATION: 97 %

## 2024-05-07 DIAGNOSIS — G89.18 POSTOPERATIVE PAIN: Primary | ICD-10-CM

## 2024-05-07 PROCEDURE — 69714 IMPL OI IMPLT SKULL PERQ ESP: CPT | Mod: LT

## 2024-05-07 PROCEDURE — L8690 AUD OSSEO DEV, INT/EXT COMP: HCPCS

## 2024-05-07 PROCEDURE — 69710 IMPLANT/REPLACE HEARING AID: CPT | Performed by: ANESTHESIOLOGY

## 2024-05-07 PROCEDURE — 69710 IMPLANT/REPLACE HEARING AID: CPT | Performed by: NURSE ANESTHETIST, CERTIFIED REGISTERED

## 2024-05-07 DEVICE — IMPLANTABLE DEVICE: Type: IMPLANTABLE DEVICE | Site: EAR | Status: FUNCTIONAL

## 2024-05-07 RX ORDER — ONDANSETRON 2 MG/ML
INJECTION INTRAMUSCULAR; INTRAVENOUS PRN
Status: DISCONTINUED | OUTPATIENT
Start: 2024-05-07 | End: 2024-05-07

## 2024-05-07 RX ORDER — FENTANYL CITRATE 50 UG/ML
25 INJECTION, SOLUTION INTRAMUSCULAR; INTRAVENOUS EVERY 5 MIN PRN
Status: DISCONTINUED | OUTPATIENT
Start: 2024-05-07 | End: 2024-05-08 | Stop reason: HOSPADM

## 2024-05-07 RX ORDER — HYDROMORPHONE HYDROCHLORIDE 1 MG/ML
0.4 INJECTION, SOLUTION INTRAMUSCULAR; INTRAVENOUS; SUBCUTANEOUS EVERY 5 MIN PRN
Status: DISCONTINUED | OUTPATIENT
Start: 2024-05-07 | End: 2024-05-08 | Stop reason: HOSPADM

## 2024-05-07 RX ORDER — CEFAZOLIN SODIUM 2 G/50ML
2 SOLUTION INTRAVENOUS SEE ADMIN INSTRUCTIONS
Status: DISCONTINUED | OUTPATIENT
Start: 2024-05-07 | End: 2024-05-08 | Stop reason: HOSPADM

## 2024-05-07 RX ORDER — DEXAMETHASONE SODIUM PHOSPHATE 10 MG/ML
10 INJECTION, SOLUTION INTRAMUSCULAR; INTRAVENOUS ONCE
Status: DISCONTINUED | OUTPATIENT
Start: 2024-05-07 | End: 2024-05-08 | Stop reason: HOSPADM

## 2024-05-07 RX ORDER — SODIUM CHLORIDE, SODIUM LACTATE, POTASSIUM CHLORIDE, CALCIUM CHLORIDE 600; 310; 30; 20 MG/100ML; MG/100ML; MG/100ML; MG/100ML
INJECTION, SOLUTION INTRAVENOUS CONTINUOUS
Status: DISCONTINUED | OUTPATIENT
Start: 2024-05-07 | End: 2024-05-08 | Stop reason: HOSPADM

## 2024-05-07 RX ORDER — LIDOCAINE HYDROCHLORIDE AND EPINEPHRINE 10; 10 MG/ML; UG/ML
INJECTION, SOLUTION INFILTRATION; PERINEURAL PRN
Status: DISCONTINUED | OUTPATIENT
Start: 2024-05-07 | End: 2024-05-07 | Stop reason: HOSPADM

## 2024-05-07 RX ORDER — HYDROCODONE BITARTRATE AND ACETAMINOPHEN 5; 325 MG/1; MG/1
1 TABLET ORAL
Status: DISCONTINUED | OUTPATIENT
Start: 2024-05-07 | End: 2024-05-08 | Stop reason: HOSPADM

## 2024-05-07 RX ORDER — FENTANYL CITRATE 50 UG/ML
INJECTION, SOLUTION INTRAMUSCULAR; INTRAVENOUS PRN
Status: DISCONTINUED | OUTPATIENT
Start: 2024-05-07 | End: 2024-05-07

## 2024-05-07 RX ORDER — DEXAMETHASONE SODIUM PHOSPHATE 10 MG/ML
4 INJECTION, SOLUTION INTRAMUSCULAR; INTRAVENOUS
Status: DISCONTINUED | OUTPATIENT
Start: 2024-05-07 | End: 2024-05-08 | Stop reason: HOSPADM

## 2024-05-07 RX ORDER — ACETAMINOPHEN 325 MG/1
975 TABLET ORAL ONCE
Status: COMPLETED | OUTPATIENT
Start: 2024-05-07 | End: 2024-05-07

## 2024-05-07 RX ORDER — OXYCODONE HYDROCHLORIDE 5 MG/1
10 TABLET ORAL
Status: DISCONTINUED | OUTPATIENT
Start: 2024-05-07 | End: 2024-05-08 | Stop reason: HOSPADM

## 2024-05-07 RX ORDER — OXYCODONE HYDROCHLORIDE 5 MG/1
5 TABLET ORAL
Status: COMPLETED | OUTPATIENT
Start: 2024-05-07 | End: 2024-05-07

## 2024-05-07 RX ORDER — ACETAMINOPHEN 325 MG/1
975 TABLET ORAL ONCE
Status: DISCONTINUED | OUTPATIENT
Start: 2024-05-07 | End: 2024-05-08 | Stop reason: HOSPADM

## 2024-05-07 RX ORDER — OXYCODONE HYDROCHLORIDE 5 MG/1
5 TABLET ORAL EVERY 6 HOURS PRN
Qty: 4 TABLET | Refills: 0 | Status: SHIPPED | OUTPATIENT
Start: 2024-05-07 | End: 2024-06-19

## 2024-05-07 RX ORDER — ONDANSETRON 2 MG/ML
4 INJECTION INTRAMUSCULAR; INTRAVENOUS EVERY 30 MIN PRN
Status: DISCONTINUED | OUTPATIENT
Start: 2024-05-07 | End: 2024-05-08 | Stop reason: HOSPADM

## 2024-05-07 RX ORDER — FENTANYL CITRATE 50 UG/ML
50 INJECTION, SOLUTION INTRAMUSCULAR; INTRAVENOUS EVERY 5 MIN PRN
Status: DISCONTINUED | OUTPATIENT
Start: 2024-05-07 | End: 2024-05-08 | Stop reason: HOSPADM

## 2024-05-07 RX ORDER — ONDANSETRON 4 MG/1
4 TABLET, ORALLY DISINTEGRATING ORAL EVERY 30 MIN PRN
Status: DISCONTINUED | OUTPATIENT
Start: 2024-05-07 | End: 2024-05-08 | Stop reason: HOSPADM

## 2024-05-07 RX ORDER — ACETAMINOPHEN 325 MG/1
650 TABLET ORAL
Status: DISCONTINUED | OUTPATIENT
Start: 2024-05-07 | End: 2024-05-08 | Stop reason: HOSPADM

## 2024-05-07 RX ORDER — CEFAZOLIN SODIUM 2 G/50ML
2 SOLUTION INTRAVENOUS
Status: COMPLETED | OUTPATIENT
Start: 2024-05-07 | End: 2024-05-07

## 2024-05-07 RX ORDER — PROPOFOL 10 MG/ML
INJECTION, EMULSION INTRAVENOUS PRN
Status: DISCONTINUED | OUTPATIENT
Start: 2024-05-07 | End: 2024-05-07

## 2024-05-07 RX ORDER — HYDROMORPHONE HYDROCHLORIDE 1 MG/ML
0.2 INJECTION, SOLUTION INTRAMUSCULAR; INTRAVENOUS; SUBCUTANEOUS EVERY 5 MIN PRN
Status: DISCONTINUED | OUTPATIENT
Start: 2024-05-07 | End: 2024-05-08 | Stop reason: HOSPADM

## 2024-05-07 RX ORDER — NALOXONE HYDROCHLORIDE 0.4 MG/ML
0.1 INJECTION, SOLUTION INTRAMUSCULAR; INTRAVENOUS; SUBCUTANEOUS
Status: DISCONTINUED | OUTPATIENT
Start: 2024-05-07 | End: 2024-05-08 | Stop reason: HOSPADM

## 2024-05-07 RX ORDER — LIDOCAINE HYDROCHLORIDE 20 MG/ML
INJECTION, SOLUTION INFILTRATION; PERINEURAL PRN
Status: DISCONTINUED | OUTPATIENT
Start: 2024-05-07 | End: 2024-05-07

## 2024-05-07 RX ORDER — PROPOFOL 10 MG/ML
INJECTION, EMULSION INTRAVENOUS CONTINUOUS PRN
Status: DISCONTINUED | OUTPATIENT
Start: 2024-05-07 | End: 2024-05-07

## 2024-05-07 RX ORDER — LIDOCAINE 40 MG/G
CREAM TOPICAL
Status: DISCONTINUED | OUTPATIENT
Start: 2024-05-07 | End: 2024-05-08 | Stop reason: HOSPADM

## 2024-05-07 RX ADMIN — PROPOFOL 40 MG: 10 INJECTION, EMULSION INTRAVENOUS at 10:24

## 2024-05-07 RX ADMIN — FENTANYL CITRATE 25 MCG: 50 INJECTION, SOLUTION INTRAMUSCULAR; INTRAVENOUS at 10:24

## 2024-05-07 RX ADMIN — LIDOCAINE HYDROCHLORIDE 40 MG: 20 INJECTION, SOLUTION INFILTRATION; PERINEURAL at 10:24

## 2024-05-07 RX ADMIN — FENTANYL CITRATE 25 MCG: 50 INJECTION, SOLUTION INTRAMUSCULAR; INTRAVENOUS at 10:35

## 2024-05-07 RX ADMIN — CEFAZOLIN SODIUM 2 G: 2 SOLUTION INTRAVENOUS at 10:19

## 2024-05-07 RX ADMIN — OXYCODONE HYDROCHLORIDE 5 MG: 5 TABLET ORAL at 11:16

## 2024-05-07 RX ADMIN — PROPOFOL 150 MCG/KG/MIN: 10 INJECTION, EMULSION INTRAVENOUS at 10:24

## 2024-05-07 RX ADMIN — ONDANSETRON 4 MG: 2 INJECTION INTRAMUSCULAR; INTRAVENOUS at 10:24

## 2024-05-07 RX ADMIN — ACETAMINOPHEN 975 MG: 325 TABLET ORAL at 09:02

## 2024-05-07 RX ADMIN — SODIUM CHLORIDE, SODIUM LACTATE, POTASSIUM CHLORIDE, CALCIUM CHLORIDE: 600; 310; 30; 20 INJECTION, SOLUTION INTRAVENOUS at 09:12

## 2024-05-07 ASSESSMENT — ENCOUNTER SYMPTOMS: SEIZURES: 0

## 2024-05-07 ASSESSMENT — LIFESTYLE VARIABLES: TOBACCO_USE: 0

## 2024-05-07 NOTE — ANESTHESIA CARE TRANSFER NOTE
Patient: Shauna Galdamez    Procedure: Procedure(s):  INSERTION, BONE ANCHORED HEARING AID WITH THE OTICON DEVICE LEFT       Diagnosis: Mixed conductive and sensorineural hearing 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 [H90.A32]  Diagnosis Additional Information: No value filed.    Anesthesia Type:   MAC     Note:    Oropharynx: oropharynx clear of all foreign objects and spontaneously breathing  Level of Consciousness: awake  Oxygen Supplementation: room air    Independent Airway: airway patency satisfactory and stable  Dentition: dentition unchanged  Vital Signs Stable: post-procedure vital signs reviewed and stable  Report to RN Given: handoff report given  Patient transferred to: Phase II    Handoff Report: Identifed the Patient, Identified the Reponsible Provider, Reviewed the pertinent medical history, Discussed the surgical course, Reviewed Intra-OP anesthesia mangement and issues during anesthesia, Set expectations for post-procedure period and Allowed opportunity for questions and acknowledgement of understanding      Vitals:  Vitals Value Taken Time   BP 95/56    Temp 36.1    Pulse 79    Resp 18    SpO2 97 % 05/07/24 1057   Vitals shown include unfiled device data.    Electronically Signed By: GT Cope CRNA  May 7, 2024  10:58 AM

## 2024-05-07 NOTE — OP NOTE
Date of service:  5/07/24    Preoperative diagnosis:  Left mixed hearing loss    Postoperative diagnosis:  Left mixed hearing loss    Procedure:  Left osseointegrated implant with the Oticon Ponto device    Surgeon:  Brigida Mello MD    Fellow:  Angélica Ibrahim MD    Anesthesia:  MAC plus local    EBL:  5 mL    Specimens:  None    Complications:  None    Findings:  None    Indications:  Shauna Galdamez has a history of mixed hearing loss.  She was evaluated and found to be a candidate for an osseointegrated implant with sound processor.  The risks and benefits were discussed with the patient and informed consent was obtained.    Procedure:  The patient was taken to the operating room, placed supine on the operating room table and MAC was given.  Time Out was then performed with confirmation of the patient, site and procedure.  The area behind the ear had been marked and the skin thickness measured.  1% lidocaine with 1:100,000 epinephrine was injected into the area.  The area was then prepped and draped in standard surgical fashion. A 4mm punch was used to remove the skin.  A linear incision was created 1cm above and below the punch.  A cruciate incision was made in the periosteum over the area of the implant and elevated off the skull.  The 3/4mm guide drill was used to drill a 3 mm hole.  No dura was encountered.  The guard was removed and the hole was deepened to 4 mm.  The 4 mm countersink was drilled.  The 4 mm implant with 9mm mm abutment was placed with good contact with the bone.  It was solid to shake test.  The skin was then closed using nylon suture.  Allevyn was placed over the skin and a healing cap placed to hold the Allevyn in position.  The patient tolerated the procedure well and there were no complications.  She was returned to anesthesia, awakened and taken to the PACU in stable condition.     IBrigida MD, was scrubbed during the entire procedure.

## 2024-05-07 NOTE — ANESTHESIA POSTPROCEDURE EVALUATION
Patient: Shauna Galdamez    Procedure: Procedure(s):  INSERTION, BONE ANCHORED HEARING AID WITH THE OTICON DEVICE LEFT       Anesthesia Type:  MAC    Note:  Disposition: Outpatient   Postop Pain Control: Uneventful            Sign Out: Well controlled pain   PONV: No   Neuro/Psych: Uneventful            Sign Out: Acceptable/Baseline neuro status   Airway/Respiratory: Uneventful            Sign Out: Acceptable/Baseline resp. status   CV/Hemodynamics: Uneventful            Sign Out: Acceptable CV status; No obvious hypovolemia; No obvious fluid overload   Other NRE: NONE   DID A NON-ROUTINE EVENT OCCUR?            Last vitals:  Vitals Value Taken Time   BP 96/63 05/07/24 1204   Temp 36.7  C (98  F) 05/07/24 1200   Pulse 71 05/07/24 1204   Resp 16 05/07/24 1200   SpO2 97 % 05/07/24 1206   Vitals shown include unfiled device data.    Electronically Signed By: Tawanda Schultz MD  May 7, 2024  2:11 PM

## 2024-05-07 NOTE — ANESTHESIA PREPROCEDURE EVALUATION
Pre-Operative H & P     CC:  Preoperative exam to assess for increased cardiopulmonary risk while undergoing surgery and anesthesia.    Date of Encounter: 4/23/2024  Primary Care Physician:  Brigida Aldrich     Reason for visit:   No diagnosis found.      HPI  Shauna Galdamez is a 48 year old female who presents for pre-operative H & P in preparation for  Procedure Information       Case: [id number] Date/Time: 05/07/24 0955    Procedure: INSERTION, BONE ANCHORED HEARING AID WITH THE OTICON DEVICE LEFT (Left: Ear)    Anesthesia type: MAC    Diagnosis: Mixed conductive and sensorineural hearing 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 [H90.A32]    Pre-op diagnosis: Mixed conductive and sensorineural hearing 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 [H90.A32]    Location: [place] [address] / [facility]    Providers: Brigida Mello MD            Patient is being evaluated for comorbid conditions of depression, anxiety, acoustic neuroma, hypertension     Ms. Galdamez has a history of a left tympanoplasty with cartilage packing, left myringotomy with left T-tube placement.  She has a history of TMJ pain so she is unable to tolerate the placement of anything in the ear canal.  She was seen by Dr. Mello last spring to discuss treatment.  She is now scheduled for the above procedure.    History is obtained from the patient and chart review    Hx of abnormal bleeding or anti-platelet use: denies    Menstrual history: No LMP recorded. Patient is perimenopausal.     Past Medical History  Past Medical History:   Diagnosis Date    Anxiety     Cherry angioma 2/6/2017    Closed fracture of tibia 1/13/2016    Depression     Mixed conductive and sensorineural hearing 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 7/9/2021    Added automatically from request for surgery [id number]    Perforation of tympanic membrane, bilateral 7/9/2021    Added  automatically from request for surgery [id number]    Solar lentiginosis 2/6/2017       Past Surgical History  Past Surgical History:   Procedure Laterality Date    ANKLE SURGERY Bilateral     ORTHOPEDIC SURGERY      PE TUBES      TYMPANOPLASTY Left 8/11/2021    Procedure: TYMPANOPLASTY WITH CARTILAGE BACKING LEFT, T TUBE INSERTION;  Surgeon: Brigida Mello MD;  Location: [facility] OR       Prior to Admission Medications  Current Outpatient Medications   Medication Sig Dispense Refill    clonazePAM (KLONOPIN) 0.5 MG tablet TAKE 1/2 TO 1 TABLET BY MOUTH 2-3 TIMES DAILY AS NEEDED FOR ANXIETY/PANIC      estradiol (VIVELLE-DOT) 0.05 MG/24HR bi-weekly patch Place 1 patch onto the skin twice a week 24 patch 2    FLUoxetine (PROZAC) 40 MG capsule Take 1 capsule (40 mg) by mouth daily (Patient taking differently: Take 60 mg by mouth every morning) 90 capsule 3    losartan-hydrochlorothiazide (HYZAAR) 50-12.5 MG tablet Take 1 tablet by mouth every morning      ondansetron (ZOFRAN ODT) 4 MG ODT tab Take 1 tablet (4 mg) by mouth every 8 hours as needed for nausea 20 tablet 3    progesterone (PROMETRIUM) 200 MG capsule Take 1 capsule (200 mg) by mouth daily for 14 days of the month (in a row). 90 capsule 1       Allergies  Allergies   Allergen Reactions    Sulfa Antibiotics Unknown    Bupropion Rash     Annotation: rash and joint pain         Social History  Social History     Socioeconomic History    Marital status:      Spouse name: Not on file    Number of children: Not on file    Years of education: Not on file    Highest education level: Not on file   Occupational History    Not on file   Tobacco Use    Smoking status: Never    Smokeless tobacco: Never   Vaping Use    Vaping status: Never Used   Substance and Sexual Activity    Alcohol use: Yes     Comment: glass of wine/night    Drug use: No    Sexual activity: Yes     Partners: Male     Birth control/protection: None   Other Topics Concern    Parent/sibling w/ CABG, MI or  angioplasty before 65F 55M? Not Asked   Social History Narrative    How much exercise per week? Few walks and yoga    How much calcium per day? Through foods       How much caffeine per day? Two cups coffee    How much vitamin D per day? Through foods    Do you/your family wear seatbelts?  Yes    Do you/your family use safety helmets? Yes    Do you/your family use sunscreen? Yes    Do you/your family keep firearms in the home? No    Do you/your family have a smoke detector(s)? Yes                     Social Determinants of Health     Financial Resource Strain: Low Risk  (9/5/2023)    Received from [facility]    Overall Financial Resource Strain (CARDIA)     Difficulty of Paying Living Expenses: Not very hard   Food Insecurity: No Food Insecurity (9/5/2023)    Received from [facility]    Hunger Vital Sign     Worried About Running Out of Food in the Last Year: Never true     Ran Out of Food in the Last Year: Never true   Transportation Needs: No Transportation Needs (9/5/2023)    Received from [facility]    PRAPARE - Transportation     Lack of Transportation (Medical): No     Lack of Transportation (Non-Medical): No   Physical Activity: Insufficiently Active (9/5/2023)    Received from [facility]    Exercise Vital Sign     Days of Exercise per Week: 2 days     Minutes of Exercise per Session: 50 min   Stress: Not on file   Social Connections: Unknown (5/3/2023)    Received from [facility]    Social Connections     Frequency of Communication with Friends and Family: Not on file   Interpersonal Safety: Not on file   Housing Stability: Low Risk  (9/5/2023)    Received from [facility]    Housing Stability     What is your living situation today?: I have a steady place to live       Family History  Family History   Problem Relation Age of Onset    Skin Cancer Mother          BCC    Mental Illness Mother     Thyroid Disease Mother     Stomach Problem Mother     Hypertension Mother     Substance Abuse Father     No Known Problems Sister     No Known Problems Brother     Heart Disease Maternal Grandmother     No Known Problems Maternal Grandfather     No Known Problems Paternal Grandmother     No Known Problems Other     Melanoma No family hx of     Anesthesia Reaction No family hx of     Deep Vein Thrombosis (DVT) No family hx of        Review of Systems  The complete review of systems is negative other than noted in the HPI or here.   Anesthesia Evaluation   Pt has had prior anesthetic.     No history of anesthetic complications       ROS/MED HX  ENT/Pulmonary: Comment: Mixed sensorineural and conductive hearing loss    (-) tobacco use   Neurologic:    (-) no seizures and no CVA   Cardiovascular:     (+)  hypertension- -   -  - -                                 Previous cardiac testing   Echo: Date: 5/2023 Results:  Final Conclusion    1. Normal left ventricular chamber size and systolic function. Estimated left ventricular   ejection fraction is 60-65%.         No regional wall motion abnormalities. Normal left ventricular wall thickness.    2.Normal right ventricular size and systolic function.    3.Mild Mitral valve posterior leaflet prolapse.Trivial mitral valve regurgitation.    4.No pericardial effusion.    5.Upper normal indexed ascending aorta dimension (3.7 cm, 2.1 cm/m ).   Stress Test:  Date: 5/2023 Results:   FINAL CONCLUSIONS    1-The perfusion images were probably normal with diaphragm/soft tissue attenuation artifact,   which may result in a decreased    sensitivity to exclude coronary artery disease.    2-Normal left ventricular wall motion.    3-Normal left ventricular size and systolic function with a calculated LVEF of 69%.    4-Stress ECG is negative for myocardial ischemia. Patient developed rate related LBBB peak   exercise at 151 bpm which improved at 1    minutes  "16 seconds into recovery at heart rate 113 bpm. Rare APCs, Occasional PVCs noted with   stress.    4-Exercise produced expected fatigue.  No chest pain noted with exercise.     ECG Reviewed:  Date: 5/2023 Results:  Normal sinus rhythm   Possible Anterior infarct , age undetermined     Cath:  Date: Results:   (-) taking anticoagulants/antiplatelets   METS/Exercise Tolerance: >4 METS Comment: Walking 20 minutes at a time without exertional symptoms, orange theory classes   Hematologic:  - neg hematologic  ROS  (-) history of blood clots and history of blood transfusion   Musculoskeletal:  - neg musculoskeletal ROS     GI/Hepatic:  - neg GI/hepatic ROS  (-) GERD   Renal/Genitourinary:  - neg Renal ROS     Endo:       Psychiatric/Substance Use:  - neg psychiatric ROS     Infectious Disease:  - neg infectious disease ROS     Malignancy:  - neg malignancy ROS     Other:            Temp 36.1  C (97  F) (Temporal)   Resp 18   Ht 1.626 m (5' 4\")   Wt 61.2 kg (135 lb)   BMI 23.17 kg/m      Physical Exam   Constitutional: Awake, alert, cooperative, no apparent distress, and appears stated age.  Eyes: Pupils equal, round and reactive to light, extra ocular muscles intact, sclera clear, conjunctiva normal.  HENT: Normocephalic, oral pharynx with moist mucus membranes, good dentition.  Respiratory: Clear to auscultation bilaterally, no crackles or wheezing.  Cardiovascular: Regular rate and rhythm, normal S1 and S2, and no murmur noted.  Carotids no bruits. No edema. Palpable pulses to radial arteries.   GI: Normal bowel sounds, soft, non-distended, non-tender  Genitourinary:  deferred  Skin: Warm and dry.   Musculoskeletal: Full ROM of neck. There is no redness, warmth, or swelling of the exposed joints. Gross motor strength is normal.    Neurologic: Awake, alert, oriented to name, place and time. Cranial nerves II-XII are grossly intact. Gait is normal.   Neuropsychiatric: Calm, cooperative. Normal affect.     Prior " Labs/Diagnostic Studies   All labs and imaging personally reviewed     EKG/ stress test - if available please see in ROS above   No results found.       No data to display                The patient's records and results personally reviewed by this provider.     Outside records reviewed from: Care Everywhere    LAB/DIAGNOSTIC STUDIES TODAY:  CBC, BMP    Assessment    Shauna Galdamez is a 48 year old female seen as a PAC referral for risk assessment and optimization for anesthesia.    Plan/Recommendations  Pt will be optimized for the proposed procedure.  See below for details on the assessment, risk, and preoperative recommendations    NEUROLOGY  - No history of TIA, CVA or seizure  -acoustic neuroma   -Post Op delirium risk factors:  No risk identified    ENT  -mixed conductive and sensorineural hearing loss. Above procedure planned   - No current airway concerns.  Will need to be reassessed day of surgery.  Mallampati: II  TM: > 3    CARDIAC  - No history of CAD and Afib  -hypertension using hyzaar  -denies cardiac symptoms   -some CP last year- stress test negative for ischemia, echo overall normal.  Patient reports ultimately symptoms were determined to be due to stress related GERD symptoms (have since improved).   - METS (Metabolic Equivalents)  Patient performs 4 or more METS exercise without symptoms             Total Score: 0      RCRI-Very low risk: Class 1 0.4% complication rate             Total Score: 0        PULMONARY  ALFONSO Low Risk             Total Score: 0      - Denies asthma or inhaler use  - Tobacco History    History   Smoking Status    Never   Smokeless Tobacco    Never       GI  PONV High Risk  Total Score: 3           1 AN PONV: Pt is Female    1 AN PONV: Patient is not a current smoker    1 AN PONV: Intended Post Op Opioids        /RENAL  - Baseline Creatinine WNL, will update prior to surgery     ENDOCRINE    - BMI: Estimated body mass index is 23.17 kg/m  as calculated from the  "following:    Height as of this encounter: 1.626 m (5' 4\").    Weight as of this encounter: 61.2 kg (135 lb).  Healthy Weight (BMI 18.5-24.9)  - No history of Diabetes Mellitus    HEME  VTE Low Risk 0.26%             Total Score: 0      - No history of abnormal bleeding or antiplatelet use.  -will update CBC prior to surgery     ANESTHESIA  -patient has had anesthesia in the past without issue. Of note, she reports her cousin  during a procedure due to the \"machine being calibrated incorrectly\" and \"user error.\" She understandably has anxiety around anesthesia due to this family history, but reports the anxiety continues to improve (unless her children are undergoing anesthesia).  She declines discussing further with an anesthesiologist today.     Different anesthesia methods/types have been discussed with the patient, but they are aware that the final plan will be decided by the assigned anesthesia provider on the date of service.    The patient is optimized for their procedure. AVS with information on surgery time/arrival time, meds and NPO status given by nursing staff. No further diagnostic testing indicated.      On the day of service:     Prep time: 11 minutes  Visit time: 10 minutes  Documentation time: 6 minutes  ------------------------------------------  Total time: 27 minutes      Corrine Pelayo PA-C  Preoperative Assessment Center  [facility]  Clinic and Surgery Center  Phone: [phone]  Fax: [phone]    Physical Exam    Airway  airway exam normal      Mallampati: I       Respiratory Devices and Support         Dental       (+) Minor Abnormalities - some fillings, tiny chips      Cardiovascular   cardiovascular exam normal          Pulmonary   pulmonary exam normal                Anesthesia Plan    ASA Status:  2    NPO Status:  NPO Appropriate    Anesthesia Type: MAC.     - Reason for MAC: straight local not clinically adequate   Induction: Intravenous.   Maintenance: " TIVA.        Consents    Anesthesia Plan(s) and associated risks, benefits, and realistic alternatives discussed. Questions answered and patient/representative(s) expressed understanding.     - Discussed: Risks, Benefits and Alternatives for BOTH SEDATION and the PROCEDURE were discussed     - Discussed with:  Patient            Postoperative Care    Pain management: IV analgesics, Oral pain medications, Multi-modal analgesia.   PONV prophylaxis: Ondansetron (or other 5HT-3), Dexamethasone or Solumedrol, Background Propofol Infusion     Comments:    Other Comments: I discussed the possibility of conversion to general anesthesia with placement of an LMA. Pt understands and agrees with this plan.

## 2024-05-07 NOTE — DISCHARGE INSTRUCTIONS
"1. Resume your home medications. Take pain medication, Tylenol or ibuprofen as needed. Use docusate to avoid constipation.    2. Wound care  * The clinic will remove your dressing next week    3. No shower until dressing is removed. May begin washing hair one day after.    4. For the next week, no heavy lifting more than 5 pounds, no strenuous activities.    5. Please call MD or come to the ED for shortness of breath, trouble breathing, inability to tolerate liquids, intractable dizziness, or signs of infection such as fevers or redness.      Call the [phone] clinic number if you have any questions and concerns during the day and call [phone] at night and ask for \"ENT resident on call\".     6. Follow up with Dr. Mello as previously scheduled.    [facility]  Home Care Following Anesthesia  For 24 hours after surgery:  Get plenty of rest.  A responsible adult must stay with you for at least 24 hours after you leave the surgery center.  Do not drive or use heavy equipment.  If you have weakness or tingling, don't drive or use heavy equipment until this feeling goes away.   Do not drink alcohol.   Avoid strenuous or risky activities.  Ask for help when climbing stairs.  You may feel lightheaded.  IF so, sit for a few minutes before standing.  Have someone help you get up.   If you have nausea (feel sick to your stomach): Drink only clear liquids such as apple juice, ginger ale, broth or 7-Up.  Rest may also help.  Be sure to drink enough fluids.  Move to a regular diet as you feel able.   You may have a slight fever.  Call the doctor if your fever is over 100 F (37.7 C) (taken under the tongue) or lasts longer than 24 hours.  You may have a dry mouth, a sore throat, muscle aches or trouble sleeping. These should go away after 24 hours.  Do not make important or legal decisions.   It is recommended to avoid smoking.   If you use hormonal birth control (such as the pill, " "patch, ring or implants):  You will need a second form of birth control for 7 days (condoms, a diaphragm or contraceptive foam).  While in the surgery center, you received a medicine called Sugammadex.  Hormonal birth control (such as the pill, patch, ring or implants) will not work as well for a week after taking this medicine.  Today you received a Marcaine or bupivacaine block to numb the nerves near your surgery site.  This is a block using local anesthetic or \"numbing\" medication injected around the nerves to anesthetize or \"numb\" the area supplied by those nerves.  This block is injected into the muscle layer near your surgical site.  The medication may numb the location where you had surgery for 6-18 hours, but may last up to 24 hours.  If your surgical site is an arm or leg you should be careful with your affected limb, since it is possible to injure your limb without being aware of it due to the numbing.  Until full feeling returns, you should guard against bumping or hitting your limb, and avoid extreme hot or cold temperatures on the skin.  As the block wears off, the feeling will return as a tingling or prickly sensation near your surgical site.  You will experience more discomfort from your incision as the feeling returns.  You may want to take a pain pill (a narcotic or Tylenol if this was prescribed by your surgeon) when you start to experience mild pain before the pain beccomes more severe.  If your pain medications do not control your pain you should notifiy your surgeon.    Tips for taking pain medications  To get the best pain relief possible, remember these points:  Take pain medications as directed, before pain becomes severe.  Pain medication can upset your stomach: taking it with food may help.  Constipation is a common side effect of pain medication. Drink plenty of  fluids.  Eat foods high in fiber. Take a stool softener if recommended by your doctor or pharmacist.  Do not drink alcohol, " drive or operate machinery while taking pain medications.  Ask about other ways to control pain, such as with heat, ice or relaxation.    Tylenol/Acetaminophen Consumption    If you feel your pain relief is insufficient, you may take Tylenol/Acetaminophen in addition to your narcotic pain medication.   Be careful not to exceed 4,000 mg of Tylenol/Acetaminophen in a 24 hour period from all sources.  If you are taking extra strength Tylenol/acetaminophen (500 mg), the maximum dose is 8 tablets in 24 hours.  If you are taking regular strength acetaminophen (325 mg), the maximum dose is 12 tablets in 24 hours.    Call a doctor for any of the following:  Signs of infection (fever, growing tenderness at the surgery site, a large amount of drainage or bleeding, severe pain, foul-smelling drainage, redness, swelling).  It has been over 8 to 10 hours since surgery and you are still not able to urinate (pass water).  Headache for over 24 hours.  Numbness, tingling or weakness the day after surgery (if you had spinal anesthesia).  Signs of Covid-19 infection (temperature over 100 degrees, shortness of breath, cough, loss of taste/smell, generalized body aches, persistent headache, chills, sore throat, nausea/vomiting/diarrhea)  Your doctor is:  Dr. Brigida Mello, ENT Otolaryngology: [phone]  Or dial [phone] and ask for the resident on call for:  ENT Otolaryngology  For emergency care, call the:  [place] [facility]:  [phone] (TTY for hearing impaired: [phone])  Tylenol 975 mg given at 9 am.  Next available dose at 3 pm.

## 2024-05-15 ENCOUNTER — ALLIED HEALTH/NURSE VISIT (OUTPATIENT)
Dept: OTOLARYNGOLOGY | Facility: CLINIC | Age: 49
End: 2024-05-15
Payer: COMMERCIAL

## 2024-05-15 DIAGNOSIS — Z98.890 POSTOPERATIVE STATE: Primary | ICD-10-CM

## 2024-05-15 PROCEDURE — 99207 PR NO CHARGE NURSE ONLY: CPT

## 2024-05-15 NOTE — NURSING NOTE
Patient presents for a nurse visit today for suture removal.    Procedure date: 05/07/2024  Procedure: BAHA  Surgeon(s): Dr. Mello    Patient reports pain level of 2 out of 10. Taking tylenol/ibuprofen for pain.    Incision appears clean, dry, and intact with no significant redness, swelling, or drainage. Edges are well-approximated. Sutures were removed without difficulty and patient tolerated well.     Provided education on incision care, pain management, and activity restrictions. Patient verbalized understanding of all instructions, and was encouraged to call with any further questions or concerns. Provided writer's direct contact information.    Follow up plan: 5/30 with Dr. Mello

## 2024-05-30 ENCOUNTER — OFFICE VISIT (OUTPATIENT)
Dept: OTOLARYNGOLOGY | Facility: CLINIC | Age: 49
End: 2024-05-30
Payer: COMMERCIAL

## 2024-05-30 VITALS
BODY MASS INDEX: 22.02 KG/M2 | OXYGEN SATURATION: 99 % | HEART RATE: 89 BPM | DIASTOLIC BLOOD PRESSURE: 64 MMHG | TEMPERATURE: 97.5 F | SYSTOLIC BLOOD PRESSURE: 93 MMHG | WEIGHT: 129 LBS | HEIGHT: 64 IN

## 2024-05-30 DIAGNOSIS — Z96.21 STATUS POST PLACEMENT OF BONE ANCHORED HEARING AID (BAHA): ICD-10-CM

## 2024-05-30 DIAGNOSIS — H90.A32 MIXED CONDUCTIVE AND SENSORINEURAL HEARING LOSS OF LEFT EAR WITH RESTRICTED HEARING OF RIGHT EAR: Primary | ICD-10-CM

## 2024-05-30 PROCEDURE — 99024 POSTOP FOLLOW-UP VISIT: CPT | Performed by: OTOLARYNGOLOGY

## 2024-05-30 RX ORDER — MUPIROCIN 20 MG/G
OINTMENT TOPICAL
Qty: 15 G | Refills: 0 | Status: SHIPPED | OUTPATIENT
Start: 2024-05-30 | End: 2024-06-09

## 2024-05-30 ASSESSMENT — PAIN SCALES - GENERAL: PAINLEVEL: NO PAIN (0)

## 2024-05-30 NOTE — NURSING NOTE
"Chief Complaint   Patient presents with    RECHECK     Follow up     Blood pressure 93/64, pulse 89, temperature 97.5  F (36.4  C), height 1.626 m (5' 4\"), weight 58.5 kg (129 lb), last menstrual period 04/22/2024, SpO2 99%, not currently breastfeeding.  Richard Montes De Oca LPN    " Expiration Date For Kenalog (Optional): apr 2022 Consent: The risks of atrophy were reviewed with the patient. X Size Of Lesion In Cm (Optional): 0 Total Volume (Ccs): 0.5 Include Z78.9 (Other Specified Conditions Influencing Health Status) As An Associated Diagnosis?: No Kenalog Preparation: Kenalog Concentration Of Kenalog Solution Injected (Mg/Ml): 10.0 Medical Necessity Clause: This procedure was medically necessary because the lesions that were treated were: Validate Note Data When Using Inventory: Yes Lot # For Kenalog (Optional): ABS 2446 Ndc# For Kenalog Only: 2297-1759-08 Administered By (Optional): Dr Bryan Detail Level: Detailed

## 2024-05-30 NOTE — PATIENT INSTRUCTIONS
You were seen in the ENT Clinic today by Dr. Mello. If you have any questions or concerns after your appointment, please contact us (see below)    The following has been recommended for you based upon your appointment today:  See audiology for swimmers plug    Please return to clinic in 6 weeks    Sent Bactroban to the pharmacy    How to Contact Us:  Send a [employer] message to your provider. Our team will respond to you via [employer]. Occasionally, we will need to call you to get further information.  For urgent matters (Monday-Friday), call the ENT Clinic: [phone] and speak with a call center team member - they will route your call appropriately.   If you'd like to speak directly with a nurse, please find our contact information below. We do our best to check voicemail frequently throughout the day, and will work to call you back within 1-2 days. For urgent matters, please use the general clinic phone numbers listed above.    Mitra SANCHEZ, RN, BSN  RN Care Coordinator, ENT Clinic  [facility]  Direct: [phone]  Angela MANUEL LPN  Direct: [phone]

## 2024-05-30 NOTE — Clinical Note
"5/30/2024       RE: Shauna Galdamez  [address]  [place] [address]     Dear Colleague,    Thank you for referring your patient, Shauna Galdamez, to the [facility] EAR NOSE AND THROAT CLINIC [place] at [facility]. Please see a copy of my visit note below.    Shauna Galdamez is here for a postoperative visit after a left osseointegrated implant.  There have not been concerns since the last visit. Her previous visit was on 05/15/2024, where she was healing as expected. She had no new concerns in regards to her procedure, and is excited to begin summer activities.     BP 93/64   Pulse 89   Temp 97.5  F (36.4  C)   Ht 1.626 m (5' 4\")   Wt 58.5 kg (129 lb)   LMP 04/22/2024 (Approximate)   SpO2 99%   BMI 22.14 kg/m    Physical examination:  Skin graft is healed.  Left Abutment is solid. She can hear tapping on the abutment. There was some crusting around the the edges of the  the abutment, which was removed. Although some of the crusting had to be cut off of hte skin, as it was quite adherent. Antibiotic ointment was placed around the area.     Assessment and plan:  Shauna Galdamez is here for a postoperative visit after a left osseointegrated implant. The left abutment is solid and she can hear tapping. She appears to be healing appropriately.  Pt may begin brushing the abutment.  She has been advised to get an infant toothbrush and lightly brush once daily with a circular action. She has been prescribed some mupirocin (BACTROBAN) 2 % external ointment, and has been instructed to apply lightly on the edges of the abutment with a Q-tip for about two weeks. She asked about how soon she can go swimming, and she was advised to consult with Audiology for custom ear molds; after having the custom swimming ear molds, she can begin swimming.     Follow-up as scheduled in 6-weeks.     Scribe Disclosure:   Radha ORTIZ " "Kvng, am serving as a scribe; to document services personally performed by Brigida Mello MD -based on data collection and the provider's statements to me.     Provider Disclosure:  I agree with above History, Review of Systems, Physical exam and Plan.  I have reviewed the content of the documentation and have edited it as needed. I have personally performed the services documented here and the documentation accurately represents those services and the decisions I have made.      Electronically signed by:  ***      Shauna Galdamez is here for a postoperative visit after a left osseointegrated implant (5/7/24).  There have not been concerns since the last visit when the healing cap was removed. She reports the area is still a little tender but there's no drainage. She is excited to begin summer activities.     BP 93/64   Pulse 89   Temp 97.5  F (36.4  C)   Ht 1.626 m (5' 4\")   Wt 58.5 kg (129 lb)   LMP 04/22/2024 (Approximate)   SpO2 99%   BMI 22.14 kg/m    Physical examination:  Left Abutment is solid. She can hear tapping on the abutment. There was some crusting around the the edges of the  the abutment, which was removed, although some of the crusting had to be cut off the skin, as it was quite adherent. Antibiotic ointment was placed around the area.     Assessment and plan:  Shauna Galdamez is here for a postoperative visit after a left osseointegrated implant. The left abutment is solid and she can hear tapping. She appears to be healing appropriately.  Pt may begin brushing the abutment.  She has been advised to get an infant toothbrush and lightly brush once daily with a circular action. She has been prescribed some mupirocin (BACTROBAN) 2 % external ointment, and has been instructed to apply lightly on the edges of the abutment with a Q-tip for about two weeks. She asked about how soon she can go swimming, and she was advised to consult with Audiology for custom ear plugs. She can " swim in a chlorinated pool right away but should wait a few weeks to swim in the lake.     Follow-up as scheduled in 6-weeks.     Scribe Disclosure:   I, Radha Calderon, am serving as a scribe; to document services personally performed by Brigida Mello MD -based on data collection and the provider's statements to me.     Provider Disclosure:  I agree with above History, Review of Systems, Physical exam and Plan.  I have reviewed the content of the documentation and have edited it as needed. I have personally performed the services documented here and the documentation accurately represents those services and the decisions I have made.        Again, thank you for allowing me to participate in the care of your patient.      Sincerely,    Brigida Mello MD

## 2024-06-12 NOTE — PROGRESS NOTES
Shauna Galdamez is a 48 year old female s/p left osseointegrated implant (5/7/24).  She continues to have mild soreness along the inferior edge of her abutment. Denies any drainage. She brushes it about every other day.     Physical examination:  female in no acute distress.  Alert and answering questions appropriately.  HB 1/6 bilaterally.  Left sided abutment is solid. The skin along the inferior edge appears thinned and mildly erythematous; no drainage. A culture was taken between the abutment and the skin.     Assessment and plan:    Shauna Galdamez is here for a postoperative visit after a left osseointegrated implant on 5/7/24. There continues to be mild soreness with mildly erythematous skin along the inferior edge of the abutment. Advised to use Bactroban to the abutment until we call with culture results. Advised to clean abutment daily instead of every other day. She is cleared for processor fitting.     Hal Briceño MD    Scribe Disclosure:   I, Janay Villanueva, am serving as a scribe; to document services personally performed by Brigida Mello MD -based on data collection and the provider's statements to me.     Provider Disclosure:  I agree with above History, Review of Systems, Physical exam and Plan.  I have reviewed the content of the documentation and have edited it as needed. I have personally performed the services documented here and the documentation accurately represents those services and the decisions I have made.

## 2024-06-17 ENCOUNTER — OFFICE VISIT (OUTPATIENT)
Dept: AUDIOLOGY | Facility: CLINIC | Age: 49
End: 2024-06-17
Payer: COMMERCIAL

## 2024-06-17 DIAGNOSIS — H90.A32 MIXED CONDUCTIVE AND SENSORINEURAL HEARING LOSS OF LEFT EAR WITH RESTRICTED HEARING OF RIGHT EAR: Primary | ICD-10-CM

## 2024-06-17 DIAGNOSIS — H90.A21 SENSORINEURAL HEARING LOSS (SNHL) OF RIGHT EAR WITH RESTRICTED HEARING OF LEFT EAR: ICD-10-CM

## 2024-06-17 PROCEDURE — V5275 EAR IMPRESSION: HCPCS | Mod: LT | Performed by: AUDIOLOGIST

## 2024-06-17 PROCEDURE — V5264 EAR MOLD/INSERT: HCPCS | Mod: LT | Performed by: AUDIOLOGIST

## 2024-06-17 NOTE — PROGRESS NOTES
AUDIOLOGY REPORT    SUBJECTIVE: Shauna Galdamez is a 48 year old female who was seen in the Audiology Clinic at [facility] on 6/17/2024 for bilateral earmold impressions for swim plugs. History is significant for left tympanoplasty with tube and right perforation.     OBJECTIVE: Otoscopy revealed ears are clear or cerumen bilaterally. The potential risks of taking an earmold impression with a perforated eardrum and/or surgically altered ear was reviewed with the patient. Risks include possible irritation to the ear and/or bleeding in the ear as well as risk of the impression material to travel beyond the cotton block, causing the materiel to go into the middle ear space. This may require removal by a physician and, in rare cases, require surgical removal. If this occurs, there is a risk of potential/additional hearing loss. Bilateral earmold impressions were taken without incident.     ASSESSMENT: Bilateral swim plugs ordered.     PLAN: Shauna would like the swim plugs mailed to her when they arrive at the clinic. Please call this clinic with any questions regarding today s appointment.      Delroy Tran, [facility]  Licensed Audiologist  [place] #[id number]

## 2024-06-19 ENCOUNTER — OFFICE VISIT (OUTPATIENT)
Dept: OTOLARYNGOLOGY | Facility: CLINIC | Age: 49
End: 2024-06-19
Payer: COMMERCIAL

## 2024-06-19 VITALS
SYSTOLIC BLOOD PRESSURE: 112 MMHG | DIASTOLIC BLOOD PRESSURE: 79 MMHG | HEART RATE: 77 BPM | OXYGEN SATURATION: 99 % | WEIGHT: 131 LBS | HEIGHT: 64 IN | BODY MASS INDEX: 22.36 KG/M2

## 2024-06-19 DIAGNOSIS — Z96.21 STATUS POST PLACEMENT OF BONE ANCHORED HEARING AID (BAHA): Primary | ICD-10-CM

## 2024-06-19 DIAGNOSIS — R23.8 SKIN IRRITATION: ICD-10-CM

## 2024-06-19 PROCEDURE — 99000 SPECIMEN HANDLING OFFICE-LAB: CPT | Performed by: PATHOLOGY

## 2024-06-19 PROCEDURE — 99024 POSTOP FOLLOW-UP VISIT: CPT | Mod: GC | Performed by: OTOLARYNGOLOGY

## 2024-06-19 PROCEDURE — 87070 CULTURE OTHR SPECIMN AEROBIC: CPT | Performed by: OTOLARYNGOLOGY

## 2024-06-19 ASSESSMENT — PAIN SCALES - GENERAL: PAINLEVEL: NO PAIN (0)

## 2024-06-19 NOTE — PATIENT INSTRUCTIONS
You were seen in the ENT Clinic today by Dr. Mello. If you have any questions or concerns after your appointment, please contact us (see below)    The following has been recommended for you based upon your appointment today:  Left BAHA culture- will call with results        How to Contact Us:  Send a [employer] message to your provider. Our team will respond to you via [employer]. Occasionally, we will need to call you to get further information.  For urgent matters (Monday-Friday), call the ENT Clinic: [phone] and speak with a call center team member - they will route your call appropriately.   If you'd like to speak directly with a nurse, please find our contact information below. We do our best to check voicemail frequently throughout the day, and will work to call you back within 1-2 days. For urgent matters, please use the general clinic phone numbers listed above.    Mitra SANCHEZ, RN, BSN  RN Care Coordinator, ENT Clinic  [facility]  Direct: [phone]  Angela MANUEL LPN  Direct: [phone]

## 2024-06-19 NOTE — Clinical Note
6/19/2024       RE: Shauna Galdamez  [address]  [place] [address]     Dear Colleague,    Thank you for referring your patient, Shauna Galdamez, to the [facility] EAR NOSE AND THROAT CLINIC [place] at [facility]. Please see a copy of my visit note below.    Shauna Galdamez is a 48 year old female s/p left osseointegrated implant (5/7/24).  She continues to have mild soreness along the inferior edge of her abutment. Denies any drainage. She cleans it about every other day.       Physical examination:  female in no acute distress.  Alert and answering questions appropriately.  HB 1/6 bilaterally.  Left sided abutment is solid. The skin along the inferior edge appears thinned and mildly erythematous; no drainage. A culture was taken.       Assessment and plan:    Shauna Galdamez is here for a postoperative visit after a left osseointegrated implant on 5/7/24. There continues to be mild soreness with mildly erythematous skin along the inferior edge of the abutment. Advised to use Bactroban to the abutment until we call with culture results. Advised to clean abutment daily instead of every other day. She is cleared for processor fitting.     Hal Briceño MD      Scribe Disclosure:   I, Janay Villanueva, am serving as a scribe; to document services personally performed by Brigida Mello MD -based on data collection and the provider's statements to me.     Provider Disclosure:  I agree with above History, Review of Systems, Physical exam and Plan.  I have reviewed the content of the documentation and have edited it as needed. I have personally performed the services documented here and the documentation accurately represents those services and the decisions I have made.      Electronically signed by:  ***      Again, thank you for allowing me to participate in the care of your patient.      Sincerely,    Brigida WAHL  MD Riaz

## 2024-06-19 NOTE — NURSING NOTE
"Chief Complaint   Patient presents with    RECHECK     BAHA     Blood pressure 112/79, pulse 77, height 1.626 m (5' 4\"), weight 59.4 kg (131 lb), last menstrual period 04/22/2024, SpO2 99%, not currently breastfeeding.  Richard Montes De Oca LPN    "

## 2024-06-21 ENCOUNTER — MYC MEDICAL ADVICE (OUTPATIENT)
Dept: OTOLARYNGOLOGY | Facility: CLINIC | Age: 49
End: 2024-06-21
Payer: COMMERCIAL

## 2024-06-21 LAB — BACTERIA SKIN AEROBE CULT: ABNORMAL

## 2024-06-25 DIAGNOSIS — R23.8 SKIN IRRITATION: Primary | ICD-10-CM

## 2024-06-25 RX ORDER — MUPIROCIN 20 MG/G
OINTMENT TOPICAL
Qty: 22 G | Refills: 0 | Status: SHIPPED | OUTPATIENT
Start: 2024-06-25 | End: 2024-07-05

## 2024-07-08 DIAGNOSIS — Z96.21 STATUS POST PLACEMENT OF BONE ANCHORED HEARING AID (BAHA): Primary | ICD-10-CM

## 2024-07-08 RX ORDER — METHYLPREDNISOLONE 4 MG
TABLET, DOSE PACK ORAL
Qty: 21 TABLET | Refills: 0 | Status: SHIPPED | OUTPATIENT
Start: 2024-07-08 | End: 2024-10-03

## 2024-07-25 DIAGNOSIS — N95.1 PERIMENOPAUSAL VASOMOTOR SYMPTOMS: ICD-10-CM

## 2024-08-07 ENCOUNTER — OFFICE VISIT (OUTPATIENT)
Dept: AUDIOLOGY | Facility: CLINIC | Age: 49
End: 2024-08-07
Payer: COMMERCIAL

## 2024-08-07 DIAGNOSIS — H90.A32 MIXED CONDUCTIVE AND SENSORINEURAL HEARING LOSS OF LEFT EAR WITH RESTRICTED HEARING OF RIGHT EAR: Primary | ICD-10-CM

## 2024-08-07 PROCEDURE — 92700 UNLISTED ORL SERVICE/PX: CPT | Performed by: AUDIOLOGIST

## 2024-08-07 NOTE — PROGRESS NOTES
AUDIOLOGY REPORT     SUBJECTIVE:  Shauna Galdamez is a 48 year old female who was seen in the Audiology Clinic at the [facility] on 8/07/24 for a fitting of a left Oticon Ponto 5 Mini. The patient was implanted with a left osseointegrated implant on 5/7/24 by Brigida Mello MD. Shauna was medically cleared for the fitting on 6/19/24. The patient's hearing was most recently evaluated at [place] on 9/6/2023 and results revealed borderline normal rising to normal hearing sensitivity in the right ear and mild sloping to profound mixed hearing loss in the left ear. 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 She was fit with a left ReSound Linx Quattro 7 slimtube BTE with a custom earmold on 9/4/2020, but does not wear it due to physical discomfort of the earmold and TMJ. The patient works as a  at the [employer].  She is accompanied by her son, Shaquille.     OBJECTIVE: Abutment site was inspected. Patient reports continued discomfort around the abutment. There is slight redness at the inferior portion of the abutment and minimal swelling at the superior portion of the abutment. Abutment is securely in place. Upon placement of the device, the patient denies pain or discomfort. The processor was placed and a feedback test was completed. In-situ measurements were completed. Shauna was oriented to proper hearing aid use, care, cleaning (no water, dry brush), batteries (size: 312, insertion/removal, toxicity, low-battery signal), aid insertion/removal, user booklet, warranty information, storage cases, and other hearing aid details. The patient confirmed understanding of hearing aid use and care, and showed proper insertion of hearing aid and batteries while in the office today.      30 minutes were spent in direct analysis and programming of the device.      Hearing aids were programmed as follows:  Program 1: General/Everyday  Program 2: Speech in Noise  Program 3: Comfort  Program 4: Music       EAR(S) FIT: Left  HEARING AID MODEL NAME: Oticon Ponto 5 Mini  HEARING AID STYLE: BAHA  SERIAL NUMBERS: Left: [id number]  WARRANTY END DATE: 5/2/27  ACCESSORY: [employer] (S/N: [id number])     The processor was successfully connected to the patient's phone. They were shown how to utilize the bluetooth to stream audio and phone calls. The patient may download the Oticon  henny if she desires, but it has the same capabilities as the settings that can be accessed in Accessibility. The patient was given additional resources regarding connectivity and bluetooth support to reference if needed. The processor was connected to the [employer] and proper use was demonstrated.      ASSESSMENT: A left Oticon Ponto 5 Mini was fit today. Verification measures were performed. Shauna was given a copy of details on their hearing aid. Patient was counseled that exact out of pocket amounts cannot be determined for hearing aid claims being sent to insurance. Any insurance coverage information presented to the patient is an estimate only, and is not a guarantee of payment. Patient has been advised to check with their own insurance.     PLAN: Shauna is encouraged to contact Dr. Mello regarding the continued pain around the abutment and is encouraged to discontinue wearing the device if she experiences additional pain. Shauna will contact the clinic for BAHA adjustments and follow-up when needed. Please call this clinic with questions regarding today s appointment.      Delroy Desir, CCC-A  Clinical Audiologist  [place] #[id number]

## 2024-08-12 ENCOUNTER — TELEPHONE (OUTPATIENT)
Dept: OBGYN | Facility: CLINIC | Age: 49
End: 2024-08-12
Payer: COMMERCIAL

## 2024-09-11 ENCOUNTER — TELEPHONE (OUTPATIENT)
Dept: OBGYN | Facility: CLINIC | Age: 49
End: 2024-09-11
Payer: COMMERCIAL

## 2024-10-02 NOTE — PROGRESS NOTES
Progress Note    SUBJECTIVE:  Shauna Galdamez is an 49 year old, , who requests an Annual Preventive Exam.     Concerns today include:   Hormone therapy: Switched from Nuvaring to estrogen/ progesterone (cyclic) HT 2023 due to HTN diagnosis. Overall happy with this regimen. Hot flashes and night sweats are relieved, although recently has been generally feeling warmer at night. Sleeping with a fan nearby. Has been waking around 2am at night. See mental health below. Vaginal dryness is more noticeable with intercourse; using water-based lubricant for sexual activity. No need for contraception due to sperm issue with .   HTN: on medication, being managed by cardiology  Bartholin Gland cyst: hx of incision and drainage at [facility] in the past. Has noticed that it comes and goes. Sometimes more uncomfortable; not currently bothersome.  Mental health: Hx of anxiety and depression. Currently taking 60mg Prozac and clonazepam 0.5mg to help sleep infrequently. Has 3 adopted children, ages 11, 15 and 18. Her 18 yr old son has a mental health condition and recently has been struggling more, recent cutting. He reaches out to pt when having difficult times. This has been stressful for her. Wanting to be present & healthy herself, for him and her younger two. Seeing a therapist regularly, which has been helpful. Learning coping techniques which have been helpful during the day, but are more challenging to use at night. Lays awake at night, feeling anxious. Clonazepam helps her to sleep, but this causes sedation and she wants to be available to her son if needed, at any hour.     Exercise: walks with dog  Nutrition:  well balanced; yogurt, cheese and latte with milk for calcium     Menstrual periods: irregular, light.   Patient's last menstrual period was 2024 (exact date).  Previous bleeding episodes were:  (2-3 days of light bleeding),  (3 days of spotting), May 30 (4 days),   (3-4 days of light). Bleeding has occurred during days withOUT progesterone, except for the 1 short episode in April. Periods are light/scant.       Last pap smear: 4/2/2021 NIL, HPV negative  History of abnormal Pap smear: NO - age 30-65 PAP every 5 years with negative HPV co-testing recommended    Mammogram: 11/2023 normal    Last Colonoscopy:Cologuard completed 1/7/23      TSH & CBC WNL 9/2023  Recent Labs   Lab Test 09/28/23  1127 07/29/22  0954   CHOL 195 188   HDL 80 72   LDL 89 88   TRIG 128 139     Received flu and COVID vaccines this year; received shingles vaccines; due to Tdap vaccine    Social hx: works as a communications teacher    Menstrual History:      4/23/2024    10:00 AM 5/7/2024     8:27 AM 10/3/2024    10:00 AM   Menstrual History   LAST MENSTRUAL PERIOD 3/29/2024 4/22/2024 8/24/2024     HISTORY:  Current Outpatient Medications   Medication Sig Dispense Refill    clonazePAM (KLONOPIN) 0.5 MG tablet TAKE 1/2 TO 1 TABLET BY MOUTH 2-3 TIMES DAILY AS NEEDED FOR ANXIETY/PANIC      estradiol (VIVELLE-DOT) 0.05 MG/24HR bi-weekly patch Place 1 patch onto the skin twice a week. 24 patch 2    FLUoxetine (PROZAC) 40 MG capsule Take 2 capsules (80 mg) by mouth every morning. 180 capsule 0    hydrOXYzine luisito (VISTARIL) 25 MG capsule Take 1 capsule (25 mg) by mouth 3 times daily as needed for anxiety. 24 capsule 0    losartan-hydrochlorothiazide (HYZAAR) 50-12.5 MG tablet Take 1 tablet by mouth every morning      progesterone (PROMETRIUM) 200 MG capsule Take 1 capsule (200 mg) by mouth daily. for 14 days of the month (in a row). 90 capsule 1     Current Facility-Administered Medications   Medication Dose Route Frequency Provider Last Rate Last Admin    Botulinum Toxin Type A (BOTOX) 200 units injection 155 Units  155 Units Intramuscular See Admin Instructions Trinity Issa APRN CNP         Allergies   Allergen Reactions    Sulfa Antibiotics Unknown    Bupropion Rash     Annotation: rash and  joint pain       Immunization History   Administered Date(s) Administered    COVID-19 12+ (Pfizer) 2024    COVID-19 Bivalent 12+ (Pfizer) 2022    COVID-19 Monovalent 18+ (Moderna) 2021, 2021, 11/15/2021    Flu, Unspecified 2023    [id number] Novel Flu 2009    Influenza (H1N1) 2009    Influenza (IIV3) PF 2009, 10/15/2010, 01/10/2012, 10/19/2012, 10/30/2015    Influenza Vaccine >6 months,quad, PF 10/05/2018, 10/03/2019, 2021, 2022    Influenza, Split Virus, Trivalent, Pf (Fluzone\Fluarix) 2009, 10/16/2015, 2024    Influenza, seasonal, injectable, PF 2009, 10/19/2012, 10/16/2015    Influenza,INJ,MDCK,PF,Quad >6mo(Flucelvax) 09/15/2020, 2023    Nasal Influenza Vaccine 2-49 (FluMist) 10/14/2013, 2014    Rabies Vaccine 07/10/2017, 2017, 2017, 2017    TDAP (Adacel,Boostrix) 2004, 2014, 10/03/2024    Td (Adult), Adsorbed 1973, 2004    Tdap (Adult) Unspecified Formulation 1973    Zoster recombinant adjuvanted (SHINGRIX) 2023, 01/10/2024       OB History    Para Term  AB Living   0 0 0 0 0 0   SAB IAB Ectopic Multiple Live Births   0 0 0 0 0   Obstetric Comments   Has 3 adopted children     Past Medical History:   Diagnosis Date    Anxiety     Cherry angioma 2017    Closed fracture of tibia 2016    Depression     Mixed conductive and sensorineural hearing 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 2021    Added automatically from request for surgery [id number]    Perforation of tympanic membrane, bilateral 2021    Added automatically from request for surgery [id number]    Solar lentiginosis 2017     Past Surgical History:   Procedure Laterality Date    ANKLE SURGERY Bilateral     IMPLANT BAHA Left 2024    Procedure: INSERTION, BONE ANCHORED HEARING AID WITH THE OTICON DEVICE LEFT;  Surgeon: Brigida Mello MD;  Location: [facility] OR    ORTHOPEDIC  SURGERY      PE TUBES      TYMPANOPLASTY Left 8/11/2021    Procedure: TYMPANOPLASTY WITH CARTILAGE BACKING LEFT, T TUBE INSERTION;  Surgeon: Brigida Mello MD;  Location: [facility] OR     Family History   Problem Relation Age of Onset    Skin Cancer Mother         BCC    Mental Illness Mother     Thyroid Disease Mother     Stomach Problem Mother     Hypertension Mother     Substance Abuse Father     No Known Problems Sister     No Known Problems Brother     Heart Disease Maternal Grandmother     No Known Problems Maternal Grandfather     No Known Problems Paternal Grandmother     No Known Problems Other     Melanoma No family hx of     Anesthesia Reaction No family hx of     Deep Vein Thrombosis (DVT) No family hx of      Social History     Socioeconomic History    Marital status:    Tobacco Use    Smoking status: Never    Smokeless tobacco: Never   Vaping Use    Vaping status: Never Used   Substance and Sexual Activity    Alcohol use: Yes     Comment: glass of wine/night    Drug use: No    Sexual activity: Yes     Partners: Male     Birth control/protection: None   Social History Narrative    How much exercise per week? Few walks and yoga    How much calcium per day? Through foods       How much caffeine per day? Two cups coffee    How much vitamin D per day? Through foods    Do you/your family wear seatbelts?  Yes    Do you/your family use safety helmets? Yes    Do you/your family use sunscreen? Yes    Do you/your family keep firearms in the home? No    Do you/your family have a smoke detector(s)? Yes                     Social Determinants of Health     Financial Resource Strain: Low Risk  (9/5/2023)    Received from [facility]    Overall Financial Resource Strain (CARDIA)     Difficulty of Paying Living Expenses: Not very hard   Food Insecurity: No Food Insecurity (9/5/2023)    Received from [facility]    Hunger Vital Sign     Worried About Running Out of Food in the Last Year:  "Never true     Ran Out of Food in the Last Year: Never true   Transportation Needs: No Transportation Needs (9/5/2023)    Received from [facility]    PRAPARE - Transportation     Lack of Transportation (Medical): No     Lack of Transportation (Non-Medical): No   Physical Activity: Insufficiently Active (9/5/2023)    Received from [facility]    Exercise Vital Sign     Days of Exercise per Week: 2 days     Minutes of Exercise per Session: 50 min    [facility]    Social Connections   Housing Stability: Low Risk  (9/5/2023)    Received from [facility]    Housing Stability     What is your living situation today?: I have a steady place to live       ROS  ROS: 10 point ROS neg other than the symptoms noted above in the HPI.        10/20/2022    11:42 AM 9/28/2023    10:33 AM 10/3/2024    10:02 AM   PHQ-9 SCORE   PHQ-9 Total Score MyChart 4 (Minimal depression)     PHQ-9 Total Score 4 7 1   PHQ-2 Score:         10/3/2024    10:02 AM 6/19/2024     8:49 AM   PHQ-2 ( 1999 Pfizer)   Q1: Little interest or pleasure in doing things 0 0   Q2: Feeling down, depressed or hopeless 0 0   PHQ-2 Score 0 0         1/2/2023    10:58 AM 9/28/2023    10:33 AM 10/3/2024     9:34 AM   SUKHI-7 SCORE   Total Score 0 (minimal anxiety)  4 (minimal anxiety)   Total Score 0 8 4     EXAM:  Blood pressure 107/77, pulse 77, height 1.626 m (5' 4\"), weight 59.3 kg (130 lb 11.2 oz), last menstrual period 08/24/2024, not currently breastfeeding. General - pleasant female in no acute distress.  Skin - no suspicious lesions or rashes  EENT-  euthyroid with out palpable nodules  Neck - supple without lymphadenopathy.  Lungs - clear to auscultation bilaterally.  Heart - regular rate and rhythm without murmur.  Abdomen - soft, nontender, nondistended, no masses or organomegaly noted.  Musculoskeletal - no gross " deformities.  Neurological - normal strength, sensation, and mental status.    Breast Exam:  Breast: Without visible skin changes. No dimpling or lesions seen.   Breasts supple, non-tender with palpation, no dominant mass, nodularity, or nipple discharge noted bilaterally. Axillary nodes negative.      Pelvic Exam: declined evaluation of the Bartholin Gland Cyst    ASSESSMENT/ PLAN:  1. Perimenopausal vasomotor symptoms  Pt desires to continue current HT regimen. Continue to track menstrual bleeding and given precautions on when to return to care.   - progesterone (PROMETRIUM) 200 MG capsule; Take 1 capsule (200 mg) by mouth daily. for 14 days of the month (in a row).  Dispense: 90 capsule; Refill: 1  - estradiol (VIVELLE-DOT) 0.05 MG/24HR bi-weekly patch; Place 1 patch onto the skin twice a week.  Dispense: 24 patch; Refill: 2    2. Episode of recurrent major depressive disorder, unspecified depression episode severity (H)  3. Anxiety  Consulted with Marissa Moore. In shared decision making with the patient, will increase Prozac to 60mg daily. Recommend switching from clonazepam to hydroxyzine, which should help with sleep and anxiety but be less sedating. Pt will follow-up in 6-8 weeks with the pharmacist for med recheck.   - FLUoxetine (PROZAC) 40 MG capsule; Take 2 capsules (80 mg) by mouth every morning.  Dispense: 180 capsule; Refill: 0  - hydrOXYzine luisito (VISTARIL) 25 MG capsule; Take 1 capsule (25 mg) by mouth 3 times daily as needed for anxiety.  Dispense: 24 capsule; Refill: 0    4. Visit for preventive health examination  - Preventive health labs up to date  - Continue care with cardiology  - Mammogram due 11/2024  - Next pap smear & colon cancer screening due 2026     Additional teaching done at this visit regarding: Bartholin Gland Cyst and when to seek care, calcium, exercise, and mental health.     Return to clinic in one year.  Follow-up as needed.    Shauna Casillas, DNP, APRN, WHNP

## 2024-10-03 ENCOUNTER — OFFICE VISIT (OUTPATIENT)
Dept: OBGYN | Facility: CLINIC | Age: 49
End: 2024-10-03
Attending: NURSE PRACTITIONER
Payer: COMMERCIAL

## 2024-10-03 VITALS
HEIGHT: 64 IN | WEIGHT: 130.7 LBS | BODY MASS INDEX: 22.31 KG/M2 | DIASTOLIC BLOOD PRESSURE: 77 MMHG | SYSTOLIC BLOOD PRESSURE: 107 MMHG | HEART RATE: 77 BPM

## 2024-10-03 DIAGNOSIS — F41.9 ANXIETY: ICD-10-CM

## 2024-10-03 DIAGNOSIS — N95.1 PERIMENOPAUSAL VASOMOTOR SYMPTOMS: ICD-10-CM

## 2024-10-03 DIAGNOSIS — Z00.00 VISIT FOR PREVENTIVE HEALTH EXAMINATION: Primary | ICD-10-CM

## 2024-10-03 DIAGNOSIS — F33.9 EPISODE OF RECURRENT MAJOR DEPRESSIVE DISORDER, UNSPECIFIED DEPRESSION EPISODE SEVERITY (H): ICD-10-CM

## 2024-10-03 PROCEDURE — 99396 PREV VISIT EST AGE 40-64: CPT | Performed by: NURSE PRACTITIONER

## 2024-10-03 PROCEDURE — 99214 OFFICE O/P EST MOD 30 MIN: CPT | Performed by: NURSE PRACTITIONER

## 2024-10-03 RX ORDER — ESTRADIOL 0.05 MG/D
1 PATCH, EXTENDED RELEASE TRANSDERMAL
Qty: 24 PATCH | Refills: 2 | Status: SHIPPED | OUTPATIENT
Start: 2024-10-03

## 2024-10-03 RX ORDER — FLUOXETINE 40 MG/1
80 CAPSULE ORAL EVERY MORNING
Qty: 180 CAPSULE | Refills: 0 | Status: SHIPPED | OUTPATIENT
Start: 2024-10-03

## 2024-10-03 RX ORDER — CLONAZEPAM 0.5 MG/1
TABLET ORAL
Status: CANCELLED | OUTPATIENT
Start: 2024-10-03

## 2024-10-03 RX ORDER — HYDROXYZINE PAMOATE 25 MG/1
25 CAPSULE ORAL 3 TIMES DAILY PRN
Qty: 24 CAPSULE | Refills: 0 | Status: SHIPPED | OUTPATIENT
Start: 2024-10-03

## 2024-10-03 RX ORDER — LOSARTAN POTASSIUM AND HYDROCHLOROTHIAZIDE 12.5; 5 MG/1; MG/1
1 TABLET ORAL EVERY MORNING
Status: CANCELLED | OUTPATIENT
Start: 2024-10-03

## 2024-10-03 RX ORDER — PROGESTERONE 200 MG/1
200 CAPSULE ORAL DAILY
Qty: 90 CAPSULE | Refills: 1 | Status: SHIPPED | OUTPATIENT
Start: 2024-10-03

## 2024-10-03 ASSESSMENT — ANXIETY QUESTIONNAIRES
GAD7 TOTAL SCORE: 4
3. WORRYING TOO MUCH ABOUT DIFFERENT THINGS: NOT AT ALL
8. IF YOU CHECKED OFF ANY PROBLEMS, HOW DIFFICULT HAVE THESE MADE IT FOR YOU TO DO YOUR WORK, TAKE CARE OF THINGS AT HOME, OR GET ALONG WITH OTHER PEOPLE?: SOMEWHAT DIFFICULT
7. FEELING AFRAID AS IF SOMETHING AWFUL MIGHT HAPPEN: SEVERAL DAYS
GAD7 TOTAL SCORE: 4
IF YOU CHECKED OFF ANY PROBLEMS ON THIS QUESTIONNAIRE, HOW DIFFICULT HAVE THESE PROBLEMS MADE IT FOR YOU TO DO YOUR WORK, TAKE CARE OF THINGS AT HOME, OR GET ALONG WITH OTHER PEOPLE: SOMEWHAT DIFFICULT
5. BEING SO RESTLESS THAT IT IS HARD TO SIT STILL: NOT AT ALL
7. FEELING AFRAID AS IF SOMETHING AWFUL MIGHT HAPPEN: SEVERAL DAYS
1. FEELING NERVOUS, ANXIOUS, OR ON EDGE: SEVERAL DAYS
6. BECOMING EASILY ANNOYED OR IRRITABLE: NOT AT ALL
GAD7 TOTAL SCORE: 4
2. NOT BEING ABLE TO STOP OR CONTROL WORRYING: SEVERAL DAYS
4. TROUBLE RELAXING: SEVERAL DAYS

## 2024-10-03 ASSESSMENT — PATIENT HEALTH QUESTIONNAIRE - PHQ9: SUM OF ALL RESPONSES TO PHQ QUESTIONS 1-9: 1

## 2024-10-03 NOTE — PATIENT INSTRUCTIONS
Thank you for trusting us with your care!   Please be aware, if you are on Mychart, you may see your results prior to your providers review. If labs are abnormal, we will call or message you on [facility] with a follow up plan.    If you need to contact us for questions about:  Symptoms, Scheduling & Medical Questions; Non-urgent (2-3 day response) [employer] message, Urgent (needing response today) [phone] (if after 3:30pm next day response)   Prescriptions: Please call your Pharmacy   Billing: [place] [phone] or [facility]:[phone]

## 2024-10-03 NOTE — LETTER
10/3/2024       RE: Shauna Galdamez  [address]  [place] [address]     Dear Colleague,    Thank you for referring your patient, Shauna Galdamez, to the [facility] [place] at [facility]. Please see a copy of my visit note below.    Progress Note    SUBJECTIVE:  Shauna Galdamez is an 49 year old, , who requests an Annual Preventive Exam.     Concerns today include:   Hormone therapy: Switched from Nuvaring to estrogen/ progesterone (cyclic) HT 2023 due to HTN diagnosis. Overall happy with this regimen. Hot flashes and night sweats are relieved, although recently has been generally feeling warmer at night. Sleeping with a fan nearby. Has been waking around 2am at night. See mental health below. Vaginal dryness is more noticeable with intercourse; using water-based lubricant for sexual activity. No need for contraception due to sperm issue with .   HTN: on medication, being managed by cardiology  Bartholin Gland cyst: hx of incision and drainage at [facility] in the past. Has noticed that it comes and goes. Sometimes more uncomfortable; not currently bothersome.  Mental health: Hx of anxiety and depression. Currently taking 60mg Prozac and clonazepam 0.5mg to help sleep infrequently. Has 3 adopted children, ages 11, 15 and 18. Her 18 yr old son has a mental health condition and recently has been struggling more, recent cutting. He reaches out to pt when having difficult times. This has been stressful for her. Wanting to be present & healthy herself, for him and her younger two. Seeing a therapist regularly, which has been helpful. Learning coping techniques which have been helpful during the day, but are more challenging to use at night. Lays awake at night, feeling anxious. Clonazepam helps her to sleep, but this causes sedation and she wants to be available to her son if needed, at any hour.      Exercise: walks with dog  Nutrition:  well balanced; yogurt, cheese and latte with milk for calcium     Menstrual periods: irregular, light.   Patient's last menstrual period was 08/24/2024 (exact date).  Previous bleeding episodes were: April 6th (2-3 days of light bleeding), April 27 (3 days of spotting), May 30 (4 days), August 24 (3-4 days of light). Bleeding has occurred during days withOUT progesterone, except for the 1 short episode in April. Periods are light/scant.       Last pap smear: 4/2/2021 NIL, HPV negative  History of abnormal Pap smear: NO - age 30-65 PAP every 5 years with negative HPV co-testing recommended    Mammogram: 11/2023 normal    Last Colonoscopy:Cologuard completed 1/7/23      TSH & CBC WNL 9/2023  Recent Labs   Lab Test 09/28/23  1127 07/29/22  0954   CHOL 195 188   HDL 80 72   LDL 89 88   TRIG 128 139     Received flu and COVID vaccines this year; received shingles vaccines; due to Tdap vaccine    Social hx: works as a communications teacher    Menstrual History:      4/23/2024    10:00 AM 5/7/2024     8:27 AM 10/3/2024    10:00 AM   Menstrual History   LAST MENSTRUAL PERIOD 3/29/2024 4/22/2024 8/24/2024     HISTORY:  Current Outpatient Medications   Medication Sig Dispense Refill     clonazePAM (KLONOPIN) 0.5 MG tablet TAKE 1/2 TO 1 TABLET BY MOUTH 2-3 TIMES DAILY AS NEEDED FOR ANXIETY/PANIC       estradiol (VIVELLE-DOT) 0.05 MG/24HR bi-weekly patch Place 1 patch onto the skin twice a week. 24 patch 2     FLUoxetine (PROZAC) 40 MG capsule Take 2 capsules (80 mg) by mouth every morning. 180 capsule 0     hydrOXYzine luisito (VISTARIL) 25 MG capsule Take 1 capsule (25 mg) by mouth 3 times daily as needed for anxiety. 24 capsule 0     losartan-hydrochlorothiazide (HYZAAR) 50-12.5 MG tablet Take 1 tablet by mouth every morning       progesterone (PROMETRIUM) 200 MG capsule Take 1 capsule (200 mg) by mouth daily. for 14 days of the month (in a row). 90 capsule 1     Current  Facility-Administered Medications   Medication Dose Route Frequency Provider Last Rate Last Admin     Botulinum Toxin Type A (BOTOX) 200 units injection 155 Units  155 Units Intramuscular See Admin Instructions Trinity Issa APRN CNP         Allergies   Allergen Reactions     Sulfa Antibiotics Unknown     Bupropion Rash     Annotation: rash and joint pain       Immunization History   Administered Date(s) Administered     COVID-19 12+ (Pfizer) 2024     COVID-19 Bivalent 12+ (Pfizer) 2022     COVID-19 Monovalent 18+ (Moderna) 2021, 2021, 11/15/2021     Flu, Unspecified 2023     E0o1-19 Novel Flu 2009     Influenza (H1N1) 2009     Influenza (IIV3) PF 2009, 10/15/2010, 01/10/2012, 10/19/2012, 10/30/2015     Influenza Vaccine >6 months,quad, PF 10/05/2018, 10/03/2019, 2021, 2022     Influenza, Split Virus, Trivalent, Pf (Fluzone\Fluarix) 2009, 10/16/2015, 2024     Influenza, seasonal, injectable, PF 2009, 10/19/2012, 10/16/2015     Influenza,INJ,MDCK,PF,Quad >6mo(Flucelvax) 09/15/2020, 2023     Nasal Influenza Vaccine 2-49 (FluMist) 10/14/2013, 2014     Rabies Vaccine 07/10/2017, 2017, 2017, 2017     TDAP (Adacel,Boostrix) 2004, 2014, 10/03/2024     Td (Adult), Adsorbed 1973, 2004     Tdap (Adult) Unspecified Formulation 1973     Zoster recombinant adjuvanted (SHINGRIX) 2023, 01/10/2024       OB History    Para Term  AB Living   0 0 0 0 0 0   SAB IAB Ectopic Multiple Live Births   0 0 0 0 0   Obstetric Comments   Has 3 adopted children     Past Medical History:   Diagnosis Date     Anxiety      Cherry angioma 2017     Closed fracture of tibia 2016     Depression      Mixed conductive and sensorineural hearing 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 2021    Added automatically from request for surgery [id number]      Perforation of tympanic membrane, bilateral 7/9/2021    Added automatically from request for surgery [id number]     Solar lentiginosis 2/6/2017     Past Surgical History:   Procedure Laterality Date     ANKLE SURGERY Bilateral      IMPLANT BAHA Left 5/7/2024    Procedure: INSERTION, BONE ANCHORED HEARING AID WITH THE OTICON DEVICE LEFT;  Surgeon: Brigida Mello MD;  Location: [facility] OR     ORTHOPEDIC SURGERY       PE TUBES       TYMPANOPLASTY Left 8/11/2021    Procedure: TYMPANOPLASTY WITH CARTILAGE BACKING LEFT, T TUBE INSERTION;  Surgeon: Brigida Mello MD;  Location: [facility] OR     Family History   Problem Relation Age of Onset     Skin Cancer Mother         BCC     Mental Illness Mother      Thyroid Disease Mother      Stomach Problem Mother      Hypertension Mother      Substance Abuse Father      No Known Problems Sister      No Known Problems Brother      Heart Disease Maternal Grandmother      No Known Problems Maternal Grandfather      No Known Problems Paternal Grandmother      No Known Problems Other      Melanoma No family hx of      Anesthesia Reaction No family hx of      Deep Vein Thrombosis (DVT) No family hx of      Social History     Socioeconomic History     Marital status:    Tobacco Use     Smoking status: Never     Smokeless tobacco: Never   Vaping Use     Vaping status: Never Used   Substance and Sexual Activity     Alcohol use: Yes     Comment: glass of wine/night     Drug use: No     Sexual activity: Yes     Partners: Male     Birth control/protection: None   Social History Narrative    How much exercise per week? Few walks and yoga    How much calcium per day? Through foods       How much caffeine per day? Two cups coffee    How much vitamin D per day? Through foods    Do you/your family wear seatbelts?  Yes    Do you/your family use safety helmets? Yes    Do you/your family use sunscreen? Yes    Do you/your family keep firearms in the home? No    Do you/your family have a smoke detector(s)?  "Yes                     Social Determinants of Health     Financial Resource Strain: Low Risk  (9/5/2023)    Received from [facility]    Overall Financial Resource Strain (CARDIA)      Difficulty of Paying Living Expenses: Not very hard   Food Insecurity: No Food Insecurity (9/5/2023)    Received from [facility]    Hunger Vital Sign      Worried About Running Out of Food in the Last Year: Never true      Ran Out of Food in the Last Year: Never true   Transportation Needs: No Transportation Needs (9/5/2023)    Received from [facility]    PRAPARE - Transportation      Lack of Transportation (Medical): No      Lack of Transportation (Non-Medical): No   Physical Activity: Insufficiently Active (9/5/2023)    Received from [facility]    Exercise Vital Sign      Days of Exercise per Week: 2 days      Minutes of Exercise per Session: 50 min    [facility] from [employer] [facility] [employer] [email or web address]    Social Connections   Housing Stability: Low Risk  (9/5/2023)    Received from [facility]    Housing Stability      What is your living situation today?: I have a steady place to live       ROS  ROS: 10 point ROS neg other than the symptoms noted above in the HPI.        10/20/2022    11:42 AM 9/28/2023    10:33 AM 10/3/2024    10:02 AM   PHQ-9 SCORE   PHQ-9 Total Score MyChart 4 (Minimal depression)     PHQ-9 Total Score 4 7 1   PHQ-2 Score:         10/3/2024    10:02 AM 6/19/2024     8:49 AM   PHQ-2 ( 1999 Pfizer)   Q1: Little interest or pleasure in doing things 0 0   Q2: Feeling down, depressed or hopeless 0 0   PHQ-2 Score 0 0         1/2/2023    10:58 AM 9/28/2023    10:33 AM 10/3/2024     9:34 AM   SUKHI-7 SCORE   Total Score 0 (minimal anxiety)  4 (minimal anxiety)   Total Score 0 8 4     EXAM:  Blood pressure 107/77, pulse 77, height 1.626 m (5' 4\"), weight 59.3 kg (130 lb 11.2 oz), last menstrual period " 08/24/2024, not currently breastfeeding. General - pleasant female in no acute distress.  Skin - no suspicious lesions or rashes  EENT-  euthyroid with out palpable nodules  Neck - supple without lymphadenopathy.  Lungs - clear to auscultation bilaterally.  Heart - regular rate and rhythm without murmur.  Abdomen - soft, nontender, nondistended, no masses or organomegaly noted.  Musculoskeletal - no gross deformities.  Neurological - normal strength, sensation, and mental status.    Breast Exam:  Breast: Without visible skin changes. No dimpling or lesions seen.   Breasts supple, non-tender with palpation, no dominant mass, nodularity, or nipple discharge noted bilaterally. Axillary nodes negative.      Pelvic Exam: declined evaluation of the Bartholin Gland Cyst    ASSESSMENT/ PLAN:  1. Perimenopausal vasomotor symptoms  Pt desires to continue current HT regimen. Continue to track menstrual bleeding and given precautions on when to return to care.   - progesterone (PROMETRIUM) 200 MG capsule; Take 1 capsule (200 mg) by mouth daily. for 14 days of the month (in a row).  Dispense: 90 capsule; Refill: 1  - estradiol (VIVELLE-DOT) 0.05 MG/24HR bi-weekly patch; Place 1 patch onto the skin twice a week.  Dispense: 24 patch; Refill: 2    2. Episode of recurrent major depressive disorder, unspecified depression episode severity (H)  3. Anxiety  Consulted with Marissa Moore. In shared decision making with the patient, will increase Prozac to 60mg daily. Recommend switching from clonazepam to hydroxyzine, which should help with sleep and anxiety but be less sedating. Pt will follow-up in 6-8 weeks with the pharmacist for med recheck.   - FLUoxetine (PROZAC) 40 MG capsule; Take 2 capsules (80 mg) by mouth every morning.  Dispense: 180 capsule; Refill: 0  - hydrOXYzine luisito (VISTARIL) 25 MG capsule; Take 1 capsule (25 mg) by mouth 3 times daily as needed for anxiety.  Dispense: 24 capsule; Refill: 0    4. Visit for preventive  health examination  - Preventive health labs up to date  - Continue care with cardiology  - Mammogram due 11/2024  - Next pap smear & colon cancer screening due 2026     Additional teaching done at this visit regarding: Bartholin Gland Cyst and when to seek care, calcium, exercise, and mental health.     Return to clinic in one year.  Follow-up as needed.    Shauna Casillas DNP, APRN, WHNP      Again, thank you for allowing me to participate in the care of your patient.      Sincerely,    GT Johnston CNP

## 2024-10-04 ENCOUNTER — TELEPHONE (OUTPATIENT)
Dept: PHARMACY | Facility: CLINIC | Age: 49
End: 2024-10-04
Payer: COMMERCIAL

## 2024-10-04 NOTE — TELEPHONE ENCOUNTER
----- Message from Shauna Casillas sent at 10/3/2024 11:40 AM CDT -----  Regarding: please call pt to schedule apt  Please call this patient and help her to schedule a visit with Marissa CLEMENTS in 6-8 weeks    Thank you,  Shauna Casillas, DNP, APRN, WHNP

## 2024-10-11 ENCOUNTER — MYC MEDICAL ADVICE (OUTPATIENT)
Dept: OBGYN | Facility: CLINIC | Age: 49
End: 2024-10-11
Payer: COMMERCIAL

## 2024-10-23 RX ORDER — PROGESTERONE 200 MG/1
200 CAPSULE ORAL DAILY
Qty: 90 CAPSULE | Refills: 1 | OUTPATIENT
Start: 2024-10-23

## 2024-11-21 ENCOUNTER — ANCILLARY PROCEDURE (OUTPATIENT)
Dept: MAMMOGRAPHY | Facility: CLINIC | Age: 49
End: 2024-11-21
Attending: FAMILY MEDICINE
Payer: COMMERCIAL

## 2024-11-21 DIAGNOSIS — Z12.31 VISIT FOR SCREENING MAMMOGRAM: ICD-10-CM

## 2024-11-21 PROCEDURE — 77067 SCR MAMMO BI INCL CAD: CPT | Mod: GC | Performed by: RADIOLOGY

## 2024-11-21 PROCEDURE — 77063 BREAST TOMOSYNTHESIS BI: CPT | Mod: GC | Performed by: RADIOLOGY

## 2024-11-23 ENCOUNTER — OFFICE VISIT (OUTPATIENT)
Dept: URGENT CARE | Facility: URGENT CARE | Age: 49
End: 2024-11-23
Payer: COMMERCIAL

## 2024-11-23 VITALS
TEMPERATURE: 98.1 F | HEART RATE: 76 BPM | SYSTOLIC BLOOD PRESSURE: 107 MMHG | WEIGHT: 128 LBS | OXYGEN SATURATION: 99 % | BODY MASS INDEX: 21.97 KG/M2 | DIASTOLIC BLOOD PRESSURE: 73 MMHG | RESPIRATION RATE: 16 BRPM

## 2024-11-23 DIAGNOSIS — J40 COMPLICATED BRONCHITIS: Primary | ICD-10-CM

## 2024-11-23 PROCEDURE — 99213 OFFICE O/P EST LOW 20 MIN: CPT | Performed by: FAMILY MEDICINE

## 2024-11-23 RX ORDER — AZITHROMYCIN 250 MG/1
TABLET, FILM COATED ORAL
Qty: 6 TABLET | Refills: 0 | Status: SHIPPED | OUTPATIENT
Start: 2024-11-23 | End: 2024-11-28

## 2024-11-23 ASSESSMENT — PAIN SCALES - GENERAL: PAINLEVEL_OUTOF10: NO PAIN (0)

## 2024-11-23 NOTE — PROGRESS NOTES
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  It can keep her up at night.  She has been taking over-the-counter cold remedies.    Objective: ENT is normal.  Neck is normal.  Lungs are clear.  Heart is regular without murmurs.  Oxygenation level is good.  Vitals are stable    Assessment and plan: Persistent cough suggesting pertussis.  I think it would make sense just empirically to treat her with Zithromax

## 2024-12-31 DIAGNOSIS — F33.9 EPISODE OF RECURRENT MAJOR DEPRESSIVE DISORDER, UNSPECIFIED DEPRESSION EPISODE SEVERITY: ICD-10-CM

## 2024-12-31 DIAGNOSIS — F41.9 ANXIETY: ICD-10-CM

## 2025-01-02 NOTE — TELEPHONE ENCOUNTER
Received refill request for prozac. Pt last seen in clinic 10/3/2024. Patient cancelled appointment with Marissa. Send in 30 day supply , will message patient and ask her to make follow up with Marissa.

## 2025-01-04 RX ORDER — FLUOXETINE 40 MG/1
CAPSULE ORAL
Qty: 60 CAPSULE | Refills: 0 | Status: SHIPPED | OUTPATIENT
Start: 2025-01-04

## 2025-02-02 DIAGNOSIS — F41.9 ANXIETY: ICD-10-CM

## 2025-02-02 DIAGNOSIS — F33.9 EPISODE OF RECURRENT MAJOR DEPRESSIVE DISORDER, UNSPECIFIED DEPRESSION EPISODE SEVERITY: ICD-10-CM

## 2025-02-03 NOTE — TELEPHONE ENCOUNTER
"Received refill request for fluoxetine. Pt last seen in clinic 10/3/24. Per Note \" 2. Episode of recurrent major depressive disorder, unspecified depression episode severity (H)  3. Anxiety  Consulted with Marissa Moore. In shared decision making with the patient, will increase Prozac to 60mg daily. Recommend switching from clonazepam to hydroxyzine, which should help with sleep and anxiety but be less sedating. Pt will follow-up in 6-8 weeks with the pharmacist for med recheck.   - FLUoxetine (PROZAC) 40 MG capsule; Take 2 capsules (80 mg) by mouth every morning.  Dispense: 180 capsule; Refill: 0  - hydrOXYzine luisito (VISTARIL) 25 MG capsule; Take 1 capsule (25 mg) by mouth 3 times daily as needed for anxiety.  Dispense: 24 capsule; Refill: 0\"     Patient has not met with pharmacist for follow up, will ask her to schedule and pend and send to ordering provider,   "

## 2025-03-03 ENCOUNTER — MYC MEDICAL ADVICE (OUTPATIENT)
Dept: OBGYN | Facility: CLINIC | Age: 50
End: 2025-03-03
Payer: COMMERCIAL

## 2025-03-03 DIAGNOSIS — F33.9 EPISODE OF RECURRENT MAJOR DEPRESSIVE DISORDER, UNSPECIFIED DEPRESSION EPISODE SEVERITY: ICD-10-CM

## 2025-03-03 DIAGNOSIS — F41.9 ANXIETY: ICD-10-CM

## 2025-03-03 NOTE — TELEPHONE ENCOUNTER
"Received refill request for hydroxyzine. Pt last seen in clinic 10/3/2024. Per note from that visit   \"2. Episode of recurrent major depressive disorder, unspecified depression episode severity (H)  3. Anxiety  Consulted with Marissa Moore. In shared decision making with the patient, will increase Prozac to 60mg daily. Recommend switching from clonazepam to hydroxyzine, which should help with sleep and anxiety but be less sedating. Pt will follow-up in 6-8 weeks with the pharmacist for med recheck.   - FLUoxetine (PROZAC) 40 MG capsule; Take 2 capsules (80 mg) by mouth every morning.  Dispense: 180 capsule; Refill: 0  - hydrOXYzine luisito (VISTARIL) 25 MG capsule; Take 1 capsule (25 mg) by mouth 3 times daily as needed for anxiety.  Dispense: 24 capsule; Refill: 0   \"  Message sent to Shauna asking her to make an appointment with Marissa.     Will pend to Shauna.   "

## 2025-03-11 RX ORDER — HYDROXYZINE PAMOATE 25 MG/1
CAPSULE ORAL
Qty: 12 CAPSULE | Refills: 0 | Status: SHIPPED | OUTPATIENT
Start: 2025-03-11 | End: 2025-03-13

## 2025-03-13 ENCOUNTER — VIRTUAL VISIT (OUTPATIENT)
Dept: PHARMACY | Facility: CLINIC | Age: 50
End: 2025-03-13
Payer: COMMERCIAL

## 2025-03-13 DIAGNOSIS — F41.9 ANXIETY: ICD-10-CM

## 2025-03-13 DIAGNOSIS — I10 BENIGN ESSENTIAL HYPERTENSION: ICD-10-CM

## 2025-03-13 DIAGNOSIS — N95.1 PERIMENOPAUSAL VASOMOTOR SYMPTOMS: ICD-10-CM

## 2025-03-13 DIAGNOSIS — F33.9 EPISODE OF RECURRENT MAJOR DEPRESSIVE DISORDER, UNSPECIFIED DEPRESSION EPISODE SEVERITY: Primary | ICD-10-CM

## 2025-03-13 RX ORDER — PROPRANOLOL HYDROCHLORIDE 10 MG/1
10 TABLET ORAL 3 TIMES DAILY PRN
Qty: 90 TABLET | Refills: 2 | Status: SHIPPED | OUTPATIENT
Start: 2025-03-13

## 2025-03-13 RX ORDER — HYDROXYZINE PAMOATE 50 MG/1
50 CAPSULE ORAL 3 TIMES DAILY PRN
Qty: 90 CAPSULE | Refills: 2 | Status: SHIPPED | OUTPATIENT
Start: 2025-03-13

## 2025-03-13 RX ORDER — FLUOXETINE HYDROCHLORIDE 40 MG/1
80 CAPSULE ORAL DAILY
Qty: 90 CAPSULE | Refills: 1 | Status: SHIPPED | OUTPATIENT
Start: 2025-03-13

## 2025-03-13 RX ORDER — FLUOCINONIDE TOPICAL SOLUTION USP, 0.05% 0.5 MG/ML
SOLUTION TOPICAL
COMMUNITY
Start: 2024-09-13

## 2025-03-13 NOTE — PATIENT INSTRUCTIONS
"Recommendations from today's MTM visit:                                                    MTM (medication therapy management) is a service provided by a clinical pharmacist designed to help you get the most of out of your medicines.   Today we reviewed what your medicines are for, how to know if they are working, that your medicines are safe and how to make your medicine regimen as easy as possible.      Increase hydroxyzine to 50mg 3 times daily as needed - your new pills are 50mg dose    Start propranolol 10mg 3 times daily as needed - this is for treating physical symptoms of anxiety. It may lower your blood pressure and pulse. Monitor for side effects (dizziness).    Continue your other medications unchanged.    Follow-up: 6 weeks    It was great speaking with you today.  I value your experience and would be very thankful for your time in providing feedback in our clinic survey. In the next few days, you may receive an email or text message from [email or web address] with a link to a survey related to your  clinical pharmacist.\"     To schedule another MTM appointment, please call the clinic directly or you may call the MTM scheduling line at [phone] or toll-free at [phone].     My Clinical Pharmacist's contact information:                                                      Please feel free to contact me with any questions or concerns you have.      Marissa Moore, PharmD, BCACP   Medication Therapy Management Pharmacist   [facility]  [phone]    "

## 2025-03-13 NOTE — PROGRESS NOTES
Medication Therapy Management (MTM) Encounter    ASSESSMENT:                            Medication Adherence/Access: No issues identified.    Hypertension  Stable per clinic readings.     Mental health  Worsened anxiety symptoms possibly due to high dose fluoxetine vs increased stressors. As patient reports improvement in anxiety with higher doses of fluoxetine and symptoms have been present only for the past 2-4 weeks, offered trial of propranolol to treat physical symptoms and she is agreeable. Hydroxyzine is ineffective at 25mg, OK to continue at 50mg as needed    Menopausal symptoms  Improved, continue medications    PLAN:                            Increase hydroxyzine to 50mg 3 times daily as needed    Start propranolol 10mg 3 times daily as needed    Follow-up: 6 weeks    SUBJECTIVE/OBJECTIVE:                          Shauna Galdamez is a 49 year old female seen for an initial visit. She was referred to me from Shauna Casillas DNP.      Reason for visit: discuss anxiety medication.    Allergies/ADRs: Reviewed in chart  Past Medical History: Reviewed in chart  Tobacco: She reports that she has never smoked. She has never used smokeless tobacco.  Alcohol: less than 1 glass of wine per night    Medication Adherence/Access: no issues reported.    Hypertension     Losartan-hydrochlorothiazide 50-12.5mg daily  Patient reports no current medication side effects  Patient does not self-monitor blood pressure.    She has a vestibular schwannoma, occasional dizziness from this.     Mental Health   Anxiety and Depression  Fluoxetine 80mg daily  Hydroxyzine 25mg 3 times a day every 4 hours and having a little relief by the 3rd dose    Clonazepam 0.5mg 1/2-1 tab 3 times a day as needed - rarely takes due to grogginess.   Patient reports no current medication side effects.  Higher dose of fluoxetine has been helpful.   Anxiety has increased in the past month, has taken hydroxyzine every day in the past 2 weeks. Can  feel an internal shakiness, sometimes physical shaking as well. Bothersome particularly at work.   Prior to perimenopause, didn't find anxiety to be a problem. Does not associate increased anxiety with higher doses of fluoxetine. Has taken high dose fluoxetine in the past without anxiety.   Stressors: spouse loss of job, son with severe mental health difficulties.   She does work with a therapist.       Menopausal symptoms:   Vivelle-dot 0.05mg patch twice a week  Progesterone 200mg daily x14 days per month  No side effect concerns. Finding medications helpful for symptom management.    Today's Vitals: There were no vitals taken for this visit.  ----------------      I spent 30 minutes with this patient today. All changes were made via collaborative practice agreement with Shauna Casillas DNP.     A summary of these recommendations was sent via [employer].    Marissa Moore, PharmD, BCACP   Medication Therapy Management Pharmacist   [facility]  [phone]     Telemedicine Visit Details  The patient's medications can be safely assessed via a telemedicine encounter.  Type of service:  Video Conference via [employer]  Originating Location (pt. Location): Home    Distant Location (provider location):  On-site  Start Time: 1:33 PM  End Time: 2:05 PM     Medication Therapy Recommendations  Anxiety   1 Current Medication: FLUoxetine (PROZAC) 40 MG capsule   Current Medication Sig: Take 2 capsules (80 mg) by mouth daily.   Rationale: Synergistic therapy - Needs additional medication therapy - Indication   Recommendation: Start Medication - propranolol 10 MG tablet   Status: Accepted per CPA   Identified Date: 3/13/2025 Completed Date: 3/13/2025         2 Current Medication: hydrOXYzine luisito (VISTARIL) 25 MG capsule (Discontinued)   Current Medication Sig: TAKE 1 CAPSULE BY MOUTH 3 TIMES DAILY AS NEEDED FOR ANXIETY.   Rationale: Dose too low - Dosage too low - Effectiveness    Recommendation: Increase Dose   Status: Accepted per CPA   Identified Date: 3/13/2025 Completed Date: 3/13/2025

## 2025-04-21 NOTE — PROGRESS NOTES
"[facility]  Follow-Up Visit    S: Ms. Shauna Galdamez is a 49 year old  here for follow up on perimenopausal symptoms. Has been on Vivelle dot and Prometrium for management of her symptoms.     Today, she is here by herself. Reports ongoing anxiety which has worsened as of late; having issues where she feels \"shaky on the inside\" and anxiety as it relates to her high burden of childcare, reporting \"I have a stressful life\". Was mentioning a lot of these symptoms to her therapist who encouraged her to present for medical evaluation of these. Wondering about testing estrogen/progesterone etc and thyroid function.     Feels like she initially had some improvement in feeling hot during the day and some joint pain with HRT but wondering if these symptoms are coming back now; however, reports that her primary concern is much more related to her anxiety.     Still having irregular periods. Had a 1 day period requiring a tampon in January and previous to that had a few days of spotting in 2024. Feels like the Vivelle and prometrium are working better compared to Nuvaring.     O:  BP 89/62   Pulse 94   Ht 1.626 m (5' 4\")   Wt 59.8 kg (131 lb 12.8 oz)   BMI 22.62 kg/m    Gen: Well-appearing, NAD  HEENT: Normocephalic, atraumatic  CV:        Well perfused  Pulm:  Normal respiratory effort and rate    A/P:  Ms. Shauna Galdamez is a 49 year old  here for follow up on perimenopause.    # Perimenopause  - Currently on Vivelle dot/Prometrium; has ongoing anxiety symptoms/mood fluctuations   - Vasomotor symptoms largely improved from previous but may be returning   - In light of worsening anxiety could consider contribution of HRT and as such discussed strategies to change HRT regimen; decided on transitioning to daily Prometrium and continuing Vivelle dot 0.05 mg dose   - Rx sent to pharmacy   - Follow up in 2 months for management of sx     # Anxiety   - Significant anxiety over the " past few months, worsening   - PTA Prozac, propranolol, hydroxyzine; recently had MTM visit  - Established in behavioral health with therapist   - Discussed psychiatry referral for medication management and recommendations in light of acute change in sx, patient amenable   - Psych referral placed   - Thyroid studies to evaluate for thyroid component of sx, will follow up at next visit     Staffed with Dr. Ramon.     Jenny Ozuna DO, PGY-2  [facility]     Patient was seen by the resident in Continuity of Care Clinic.  I discussed the patient with the resident during the patient's visit and the patient's assessment and plan were made jointly.      Rose Mary Ramon MD

## 2025-04-22 ENCOUNTER — LAB (OUTPATIENT)
Dept: LAB | Facility: CLINIC | Age: 50
End: 2025-04-22
Payer: COMMERCIAL

## 2025-04-22 ENCOUNTER — OFFICE VISIT (OUTPATIENT)
Dept: OBGYN | Facility: CLINIC | Age: 50
End: 2025-04-22
Payer: COMMERCIAL

## 2025-04-22 VITALS
WEIGHT: 131.8 LBS | BODY MASS INDEX: 22.5 KG/M2 | SYSTOLIC BLOOD PRESSURE: 89 MMHG | HEART RATE: 94 BPM | DIASTOLIC BLOOD PRESSURE: 62 MMHG | HEIGHT: 64 IN

## 2025-04-22 DIAGNOSIS — F41.9 ANXIETY: ICD-10-CM

## 2025-04-22 DIAGNOSIS — N95.1 PERIMENOPAUSAL VASOMOTOR SYMPTOMS: ICD-10-CM

## 2025-04-22 DIAGNOSIS — F41.9 ANXIETY: Primary | ICD-10-CM

## 2025-04-22 LAB — TSH SERPL DL<=0.005 MIU/L-ACNC: 2.34 UIU/ML (ref 0.3–4.2)

## 2025-04-22 PROCEDURE — 36415 COLL VENOUS BLD VENIPUNCTURE: CPT

## 2025-04-22 PROCEDURE — 84443 ASSAY THYROID STIM HORMONE: CPT

## 2025-04-22 PROCEDURE — 99213 OFFICE O/P EST LOW 20 MIN: CPT

## 2025-04-22 RX ORDER — PROGESTERONE 100 MG/1
100 CAPSULE ORAL DAILY
Qty: 90 CAPSULE | Refills: 2 | Status: SHIPPED | OUTPATIENT
Start: 2025-04-22 | End: 2025-04-23

## 2025-04-22 NOTE — PATIENT INSTRUCTIONS
Thank you for trusting us with your care!   Please be aware, if you are on Mychart, you may see your results prior to your providers review. If labs are abnormal, we will call or message you on [employer] with a follow up plan.    If you need to contact us for questions about:  Symptoms, Scheduling & Medical Questions; Non-urgent (2-3 day response) [email or web address] message, Urgent (needing response today) [phone] (if after 3:30pm next day response)   Prescriptions: Please call your Pharmacy   Billing: [facility] [phone] or [facility]:[phone]

## 2025-04-23 ENCOUNTER — PATIENT OUTREACH (OUTPATIENT)
Dept: CARE COORDINATION | Facility: CLINIC | Age: 50
End: 2025-04-23
Payer: COMMERCIAL

## 2025-04-23 RX ORDER — PROGESTERONE 100 MG/1
100 CAPSULE ORAL DAILY
Qty: 90 CAPSULE | Refills: 4 | Status: SHIPPED | OUTPATIENT
Start: 2025-04-23

## 2025-04-23 RX ORDER — ESTRADIOL 0.05 MG/D
1 PATCH, EXTENDED RELEASE TRANSDERMAL
Qty: 24 PATCH | Refills: 4 | Status: SHIPPED | OUTPATIENT
Start: 2025-04-24

## 2025-04-24 ENCOUNTER — VIRTUAL VISIT (OUTPATIENT)
Dept: PHARMACY | Facility: CLINIC | Age: 50
End: 2025-04-24
Payer: COMMERCIAL

## 2025-04-24 DIAGNOSIS — F33.9 EPISODE OF RECURRENT MAJOR DEPRESSIVE DISORDER, UNSPECIFIED DEPRESSION EPISODE SEVERITY: ICD-10-CM

## 2025-04-24 DIAGNOSIS — F41.9 ANXIETY: Primary | ICD-10-CM

## 2025-04-24 RX ORDER — HYDROXYZINE HYDROCHLORIDE 25 MG/1
25-50 TABLET, FILM COATED ORAL 3 TIMES DAILY PRN
Qty: 90 TABLET | Refills: 1 | Status: SHIPPED | OUTPATIENT
Start: 2025-04-24

## 2025-04-24 NOTE — PATIENT INSTRUCTIONS
"Recommendations from today's MTM visit:                                                         Refilled hydroxyzine 25mg tablets to take as needed    Try propranolol as needed before your psychiatry appointment.    Consider lowering fluoxetine or switching to a different antidepressant.  The increased anxiety and nausea could be side effects.     Follow-up: 4-6 weeks    It was great speaking with you today.  I value your experience and would be very thankful for your time in providing feedback in our clinic survey. In the next few days, you may receive an email or text message from [facility] [employer] with a link to a survey related to your  clinical pharmacist.\"     To schedule another MTM appointment, please call the clinic directly or you may call the MTM scheduling line at [phone] or toll-free at [phone].     My Clinical Pharmacist's contact information:                                                      Please feel free to contact me with any questions or concerns you have.      Marissa Moore, PharmD, BCACP   Medication Therapy Management Pharmacist   [facility]  [phone]    "

## 2025-04-24 NOTE — PROGRESS NOTES
Medication Therapy Management (MTM) Encounter    ASSESSMENT:                            Medication Adherence/Access: No issues identified.    Mental health:   Anxiety is unchanged and continues to be problematic. Discussed activating side effects from fluoxetine and may be contributing to increased anxiety and possibly also nausea.  Encouraged she trial propranolol this week prior to her upcoming psychiatry appointment. Consider discussion around switching SSRI due to refractory anxiety symptoms.     PLAN:                            Start propranolol as needed as previously discussed    Follow-up: 4-6 weeks    SUBJECTIVE/OBJECTIVE:                          Shauna Galdamez is a 49 year old female seen for a follow-up visit.       Reason for visit: medication recheck.    Allergies/ADRs: Reviewed in chart  Past Medical History: Reviewed in chart  Tobacco: She reports that she has never smoked. She has never used smokeless tobacco.  Alcohol: less than 1 glass of wine per night.    Medication Adherence/Access: no issues reported.    Mental Health   Anxiety and Depression  Fluoxetine 80mg daily  Hydroxyzine 25mg 3 times a day as needed - is finding 25mg is helpful when she takes it in the morning, better than 50mg dose and would prefer to have 25mg tabs available.  Clonazepam 0.5mg 1/2-1 tab 3 times a day as needed - rarely takes due to grogginess.   Propranolol - hasn't tried it yet  Patient reports medication side effects: hydroxyzine makes her very groggy, hard to stay up in the evening.  In the morning has been nauseous, sometimes vomiting.    Continues to have internal shakiness, sometimes physical shaking as well. Bothersome particularly at work.   Prior to perimenopause, didn't find anxiety to be a problem. Does not associate increased anxiety with higher doses of fluoxetine, however the timeline fits. Has taken high dose fluoxetine in the past without anxiety.   Stressors: spouse loss of job, son with severe  mental health difficulties.   She does work with a therapist.  She was recommended to meet with a psychiatrist. Has appt scheduled in 2 weeks.         Today's Vitals: There were no vitals taken for this visit.  ----------------      I spent 12 minutes with this patient today. All changes were made via collaborative practice agreement with Brigida Aldrich MD.     A summary of these recommendations was sent via [employer].    Marissa Moore, PharmD, BCACP   Medication Therapy Management Pharmacist   [facility]  [phone]     Telemedicine Visit Details  The patient's medications can be safely assessed via a telemedicine encounter.  Type of service:  Telephone visit  Originating Location (pt. Location): Home    Distant Location (provider location):  On-site  Start Time: 10:00 AM  End Time: 10:12 AM     Medication Therapy Recommendations  No medication therapy recommendations to display

## 2025-04-30 NOTE — PROGRESS NOTES
"Virtual Visit Details    Type of service:  Video Visit     Originating Location (pt. Location): Home    Distant Location (provider location):  Off-site  Platform used for Video Visit: [facility]             PSYCHIATRIC  DIAGNOSTIC  EVALUATION                                                                    Name:  Shauna Galdamez  : 1975     Telemedicine Visit: The patient's condition can be safely assessed and treated via synchronous audio and visual telemedicine encounter.      Consent:  The patient/guardian has verbally consented to: the potential risks and benefits of telemedicine (video visit or phone) versus in person care; bill my insurance or make self-payment for services provided; and responsibility for payment of non-covered services.    As the provider I attest to compliance with applicable laws and regulations related to telemedicine.    Source of Referral:  Primary Care Provider: Brigida Aldrich MD   Referred by: Rose Mary Ramon MD (?OBGYN)  Current Psychotherapist: ?community provider     PCP Clinical Question for referral: \"worsening anxiety in perimenopause, refractory to prozac dose increase\"    The [facility] psychiatry providers act as a specialty service for Primary Care Providers in the [facility] who seek to optimize medications for unstable patients. Once medications have been optimized, [facility] providers discharge the patient back to the referring Primary Care Provider for ongoing medication management. This type of system allows [facility] to serve a high volume of patients.     Identifying Data:  Patient is a 49 year old,  White  or  female  who presents for initial visit with me.    Patient prefers to be called: \"Shauna\".  Patient is currently employed full time.     Patient attended the session alone.    HPI  Patient presents for initial psychiatric evaluation with the [facility] Service ([facility]) for evaluation of anxiety.  " "    RECORDS AVAILABLE FOR REVIEW: EHR records through [employer]  and I have reviewed the assessment completed by Arina Whittaker, dated today .       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 Carries past diagnoses: anxiety, depression. Additional medical comorbidities include: history of vestibular (acoustic) schwannoma and hearing loss, perimenopause. Denies history of past psych providers, hopsitalizations, or suicide attempts. Patient referred by current OBGYN given ongoing significant anxiety that has worsened in context of perimenopause, no noted benefit from fluoxetine 80 mg (on for 30 years with good benefit previously, increased to max 10/2024). Additionally has hydroxyzine 25 mg to use PRN , which is helpful but causes some daytime somnolence / PM drowsiness, as well as dizziness at times. She did not tolerate clonazepam, wishes to avoid benzodiazepines. Has not trialed propranolol yet (10 mg PRN).     ANXIETY: patient reports a moderate level of anxiety (self rates as 4-8/10 recently, GAD7: 8). Anxiety associated with excessive worry, difficult to control,  easily fatigued,  difficulty concentrating or mind going blank, irritability, and sleep disturbances. She reports significant physical symptoms: \"shakiness\" internal and external, nausea and vomiting at times, chest tightness. History of chest pain and went to ED previously (05/2023) - determined to be reflux or MSK causing " "that per patient. During that time period BP was high and started on losartan, historically has been low. Does have BP cuff at home -not checked recently. Has follow-up with cardio coming up.  She denies long history of anxiety, only more recently developed which she correlates to perimenopause. Does endorse significant psychosocial stressors (\"Life that is more stressful than what other people encounter\"). 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 Does find hydroxyzine somewhat helpful but \"puts me out\" by 6-7 PM. Denies known worsening of anxiety with fluoxetine increase, but did not improve either.     SLEEP: patient reports historically \"always a really solid sleeper through whole night.\" Since perimenopause up for 1-2 hours  / night aroudn 2AM. Typically goes to bed early and sleeps 8PM-2AM, and then another 4 hours later in AM. \"Not sleep deprived, but exhausted.\" Is  Napping regularly. Denies significant vasomotor sx / hot flashes disrupting sleep.    DEPRESSION: patient reports some mild depressive symptoms (PHQ9:8), denies significant low mood or anhedonia. Does endorse + sleep changes (insomnia or hypersomnia), psychomotor agitation or retardation, fatigue of loss of energy, and difficulty concentrating. Reports longer history of low depression, had been well managed with lower dose fluoxetine for many years. Denies any SI/SIB/HI, no significant history.     No history of azalea, psychosis, problematic substance use or CD treatment. Does endorse some ETOH use and recent cannabis experimentation. She is well established with therapist.        Current Medications:    Current Outpatient Medications:     clonazePAM (KLONOPIN) 0.5 MG tablet, TAKE 1/2 TO 1 TABLET BY MOUTH 2-3 TIMES DAILY AS NEEDED FOR ANXIETY/PANIC, Disp: , Rfl:     estradiol (VIVELLE-DOT) 0.05 MG/24HR bi-weekly patch, Place 1 patch onto the skin twice a week., Disp: 24 patch, Rfl: 4    fluocinonide (LIDEX) 0.05 % " "external solution, 1 APPLICATION TWICE A DAY TOPICALLY X 7 DAYS THEN TWICE A WEEK AS NEEDED, Disp: , Rfl:     FLUoxetine (PROZAC) 40 MG capsule, Take 2 capsules (80 mg) by mouth daily., Disp: 90 capsule, Rfl: 1    hydrOXYzine HCl (ATARAX) 25 MG tablet, Take 1-2 tablets (25-50 mg) by mouth 3 times daily as needed for anxiety., Disp: 90 tablet, Rfl: 1    losartan-hydrochlorothiazide (HYZAAR) 50-12.5 MG tablet, Take 1 tablet by mouth every morning, Disp: , Rfl:     progesterone (PROMETRIUM) 100 MG capsule, Take 1 capsule (100 mg) by mouth daily., Disp: 90 capsule, Rfl: 4    progesterone (PROMETRIUM) 200 MG capsule, Take 1 capsule (200 mg) by mouth daily. for 14 days of the month (in a row)., Disp: 90 capsule, Rfl: 1    propranolol (INDERAL) 10 MG tablet, Take 1 tablet (10 mg) by mouth 3 times daily as needed (anxiety)., Disp: 90 tablet, Rfl: 2    Medication side effects: Denies    The [place] Prescription Monitoring Program has been reviewed and there are no concerns about diversionary activity for controlled substances at this time.    05/07/202405/07/20241  Oxycodone Hcl (Ir) 5 Mg Tablet  4.001Ti [id number] ([phone]/030.00 [place]     Psychiatric Review of Symptoms:  Depression: denies depression and anhedonia. + sleep changes (insomnia or hypersomnia), psychomotor agitation or retardation, fatigue of loss of energy, and difficulty concentrating or indecisiveness Self rates as \"mostly the anxiety\" / 10, where 0 is none at all and 10 being severe depression.  SI/SIB/HI: denies all, no significant history.   Anxiety: excessive worry, difficult to control,  easily fatigued,  difficulty concentrating or mind going blank, irritability, and sleep disturbances (difficulty falling / staying asleep, or restless / unsatisfying) Self rates as \"4 now, 6 last week, and 7-8 before then\"/10, where 0 is none at all and 10 being severe anxiety.  Sleep / changes: see HPI  Energy: low daily  Appetite or weight changes: denies " significant changes.   Génesis / impulsivity / racing thoughts / speech: no history   Panic (description): none in last ~ month  OCD: no symptoms  Psychosis/AH/VH/delusions: denies  Paranoia: denies   Eating DO: no symptoms  Trauma sx: Experienced traumatic event ACES no, 3 adopted kids who have trauma,  was out of a job     All other ROS negative.     PHQ-9 scores:       9/28/2023    10:33 AM 10/3/2024    10:02 AM 5/1/2025    12:20 PM   PHQ-9 SCORE   PHQ-9 Total Score MyChart   8 (Mild depression)   PHQ-9 Total Score 7 1 8        Proxy-reported       SUKHI-7 scores:        9/28/2023    10:33 AM 10/3/2024     9:34 AM 5/1/2025    12:26 PM   SUKHI-7 SCORE   Total Score  4 (minimal anxiety) 8 (mild anxiety)   Total Score 8 4 8        Proxy-reported       Promis-10   [id number] [employer] Version 1.1    Question 5/1/2025 12:23 PM CDT - Filed by Melvina Rodriguez   In general, would you say your health is: Very good   In general, would you say your quality of life is: Fair   In general, how would you rate your physical health? Fair   In general, how would you rate your mental health, including your mood and your ability to think? Fair   In general, how would you rate your satisfaction with your social activities and relationships? Very good   In general, please rate how well you carry out your usual social activities and roles. (This includes activities at home, at work and in your community, and responsibilities as a parent, child, spouse, employee, friend, etc.) Good   To what extent are you able to carry out your everyday physical activities such as walking, climbing stairs, carrying groceries, or moving a chair? Completely   In the past 7 days    How often have you been bothered by emotional problems such as feeling anxious, depressed or irritable? Sometimes   How would you rate your fatigue on average? Severe   How would you rate your pain on average?   0 = No Pain  to  10 =  "Worst Imaginable Pain 0              Medical Review of Systems:  10 systems (general, cardiovascular, respiratory, eyes, ENT, endocrine, GI, , M/S, neurological) were reviewed. Most pertinent finding(s) is/are: + nausea, vomiting in AM.  No acute distress; no wheezing / short of breath / increased work of breathing; denies chest pain / tightness / palpitations; reduced appetite and recent weight loss; no tics / tremors / abnormal muscle mvmts noted on camera; no visible skin changes / rashes.. The remaining systems are all unremarkable.    Vitals:   There were no vitals taken for this visit.    Pulse Readings from Last 5 Encounters:   04/22/25 94   11/23/24 76   10/03/24 77   06/19/24 77   05/30/24 89     Wt Readings from Last 5 Encounters:   04/22/25 59.8 kg (131 lb 12.8 oz)   11/23/24 58.1 kg (128 lb)   10/03/24 59.3 kg (130 lb 11.2 oz)   06/19/24 59.4 kg (131 lb)   05/30/24 58.5 kg (129 lb)     BP Readings from Last 5 Encounters:   04/22/25 89/62   11/23/24 107/73   10/03/24 107/77   06/19/24 112/79   05/30/24 93/64       Labs:    Recent Labs   Lab Test 04/23/24  1036   WBC 5.7   HGB 12.9   HCT 38.5   MCV 91        Recent Labs   Lab Test 04/23/24  1036 07/29/22  0954    138   POTASSIUM 3.9 3.9   CHLORIDE 101 103   CO2 27 26   GLC 78 95   KAY 8.8 9.1   MAG  --  2.2   BUN 12.3 11   CR 0.81 0.64   GFRESTIMATED 88 >90     Recent Labs   Lab Test 09/28/23  1127   CHOL 195   LDL 89   HDL 80   TRIG 128     Recent Labs   Lab Test 04/22/25  1556   TSH 2.34     No results found for: \"RCI857\", \"XVTQ896\", \"JDPT72UXQZA\", \"VITD3\", \"D2VIT\", \"D3VIT\", \"DTOT\", \"VD68235884\", \"ON91189857\", \"[id number]\", \"TW28191697\", \"[id number]\", \"[id number]\"      Psychiatric History:   Hospitalizations: None  History of Commitment? No   Past Treatment: counseling and medication(s) from physician / PCP  History of Suicidal Ideation: denies  Suicide Attempts: No   History of Violence/Aggression: No   Self-injurious Behavior: " "Denies  Electroconvulsive Therapy (ECT) or Transcranial Magnetic Stimulation (TMS): No   GeneSight Genetic Testing: No     Past psychotropic medication trials include but are not limited to:   ?maybe tried moving from fluoxetine during pregnancy attempts  Clonazepam 0.5 mg PRN: hasn't really used, \"got loopy on it\"    Substance Use History:  Current Use of Drugs/Alcohol: Alcohol  and Cannabis (tried in last couple weeks)   Past Use of Drugs/Alcohol: denies significant  Patient reports no problems as a result of their drinking / drug use.   Patient has not received chemical dependency treatment in the past  Recovery Programming Involvement: None    Tobacco use: No  Caffeine:  Yes  1/2 cups/day of coffee    Based on the clinical interview, there  are not indications of drug or alcohol abuse. Continue to monitor.   Discussed effect of substance use on overall health.   CAGE-AID Score today was: 0     Family History:   Patient reported family history includes:   Family History   Problem Relation Age of Onset    Skin Cancer Mother         BCC    Mental Illness Mother     Thyroid Disease Mother     Stomach Problem Mother     Hypertension Mother     Substance Abuse Father     No Known Problems Sister     No Known Problems Brother     Heart Disease Maternal Grandmother     No Known Problems Maternal Grandfather     No Known Problems Paternal Grandmother     No Known Problems Other     Melanoma No family hx of     Anesthesia Reaction No family hx of     Deep Vein Thrombosis (DVT) No family hx of       Sudden cardiac death at young age? MGM - MI at 36 years old  Mental Illness History: Yes: adoptive children. Mother: depression (undiagnosed); brother.  Substance Abuse History: Yes: Per EPIC Electronic Medical Record  Suicide History: Yes: brother  of alcohol use per pt   Medications that helped: Denies     Social History:   [facility] during today's team-based visit:  \"Patient reported  born in . " "[place], [place] .  They were raised by biological parents. Patient reported that   childhood was \"oden idealic.\"  Patient does not experiencing childhood abuse/neglect. Patient described their current relationships with family of origin as \"good\".       The patient has been  1x times and has 3 children. Pt  described the relationship with   spouse as, \"pretty good, were working on it.\" Patient reported having extensive good friends.      Cultural influences and impact on patient's life structure, values, norms, and healthcare: Racial or Ethnic Self-Identification white, Time Orientation: On time, Social Orientation: Has many friends, Verbal / Non-verbal Communication Style: Able to understand and reciprocate, and Spiritual Beliefs: Patient denies being Christianity or spiritual . Patient identified their preferred language to be English. Patient reported they does not need the assistance of an  or other support involved in treatment.\"    Current Living situation: , 3 children. Feels safe at home.   Firearms/Weapons Access: denies      Significant Losses / Trauma / Abuse / Neglect Issues:  There are no indications or report of: denies significant losses, trauma, abuse or neglect to person but raising 3 children with significant trauma history.  Issues of possible neglect are not present.   Recommended that patient call 911 or go to the local ED should there be a change in any of these risk factors    Past Medical History:  Reviewed per Electronic Medical Record Today    Past Medical History:   Diagnosis Date    Anxiety     Cherry angioma 2/6/2017    Closed fracture of tibia 1/13/2016    Depression     Mixed conductive and sensorineural hearing 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 7/9/2021    Added automatically from request for surgery [id number]    Perforation of tympanic membrane, bilateral 7/9/2021    Added automatically from request for surgery [id number]    Solar lentiginosis " 2/6/2017      Surgery:   Past Surgical History:   Procedure Laterality Date    ANKLE SURGERY Bilateral     IMPLANT BAHA Left 5/7/2024    Procedure: INSERTION, BONE ANCHORED HEARING AID WITH THE OTICON DEVICE LEFT;  Surgeon: Brigida Mello MD;  [place] [facility] [place]    ORTHOPEDIC SURGERY      PE TUBES      TYMPANOPLASTY Left 8/11/2021    Procedure: TYMPANOPLASTY WITH CARTILAGE BACKING LEFT, T TUBE INSERTION;  Surgeon: Brigida Mello MD;  [place] [facility] [place]     Food and Medicine Allergies:     Allergies   Allergen Reactions    Sulfa Antibiotics Unknown    Bupropion Rash     Annotation: rash and joint pain       Seizures or Head Injury: denies   Diet: No Restrictions  Exercise: No regular exercise program discussed  Supplements:   Pregnant: denies      Mental Status Exam:  Alertness: alert  and oriented   Appearance: well groomed  Behavior/Demeanor: cooperative and pleasant, with good eye contact   Speech: normal and regular rate and rhythm, talkative  Language: intact and no problems  Psychomotor: fidgety  Mood: anxious  Affect: full range and appropriate, bright; was congruent to mood; was congruent to content  Thought Process/Associations: unremarkable  Thought Content:  Reports none;  Denies suicidal ideation, violent ideation, and delusions  Perception:  Reports none;  Denies auditory hallucinations and visual hallucinations  Insight: intact  Judgment: intact  Cognition: does  appear grossly intact; formal cognitive testing was not done  Recent and Remote Memory: Intact to interview. Not formally assessed. No amnesia.  Attention Span and Concentration:  normal  Fund of Knowledge:  appropriate  Gait and Station: unremarkable via seated video visit    Strengths and Opportunities:   Shauna ROMO Denice identified the following strengths or resources that may help she succeed in counseling: commitment to health and well being, friends / good social support, insight, intelligence, and strong social skills, established  therapeutic relationship.    Things that may interfere with the patient's success include:  none noted at this time.    Protective Factors: medical compliance, insight, future oriented, restricted access to means, social support, access to care as needed, motivation and readiness for change, reasons for living, and displaying help seeking behavior    Static Risk Factors: history of MH diagnoses and/or treatment    Dynamic Risk Factors: insomnia, substance use, anxiety, and work related problems    There are no language or communication issues or need for modification in treatment.   There are no ethnic, cultural or Gnosticist factors that may be relevant for therapy.  Client identified their preferred language to be English.  Client does not need the assistance of an  or other support involved in therapy.    Suicide Risk Assessment:  Based on review of above risk and protective factors and today's exam, pt is at low acute risk of harm to self or others and chronic elevated risk due to history and risk factors. She does not meet criteria for a 72 hr hold and remains appropriate for ongoing outpatient care. The patient convincingly denies suicidality today. There was no deceit detected, and the patient presented in a manner that was believable. Local community safety resources reviewed and sent to patient to use if needed.    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    Medical Comorbidities Include:   Patient Active Problem List    Diagnosis Date Noted    Mucoid cyst of joint 02/15/2022     Priority: Medium     Added automatically from request for surgery [id number]      Perforation of tympanic membrane, bilateral 07/09/2021     Priority: Medium     Added automatically from request for surgery [id number]      Mixed conductive and sensorineural  hearing 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 07/09/2021     Priority: Medium     Added automatically from request for surgery [id number]      Episode of recurrent major depressive disorder, unspecified depression episode severity 04/02/2021     Priority: Medium    Family history of melanoma 02/06/2017     Priority: Medium    Pilar cyst 02/06/2017     Priority: Medium    Anxiety 04/11/2011     Priority: Medium    PMS (premenstrual syndrome) 11/17/2010     Priority: Medium       DIFFERENTIAL DIAGNOSIS: R/O anxiety disorder versus adjustment disorder versus anxiety due to other medical condition     Medical comorbidities impacting or contributing to clinical picture: history of vestibular (acoustic) schwannoma and hearing loss, perimenopause.   Known issue that I take into account for their medical decisions, no current exacerbations or new concerns.    Impression:  Shauna Galdamez is a 49 year old White, female who presents for initial visit with Collaborative Care Psychiatry Service (CCPS) for medication management. Carries past diagnoses: anxiety, depression. Denies history of past psych providers, hopsitalizations, or suicide attempts. Patient referred by current OBGYN given ongoing significant anxiety that has worsened in context of perimenopause and psychosocial stressors. No noted benefit from fluoxetine 80 mg (on for 30 years with good benefit to depression previously). Using PRN hydroxyzine 25 mg with some relief but bothersome side effects. Has not trialed propranolol yet (10 mg PRN). Denies significant depression or anhedonia. No SI/SIB/HI, psychosis, azalea, problematic substance use / past CD treatment. Discussed risks / benefits of medication changes today. Will initially trial reducing fluoxetine to r/o sx worsening due to medication and trial propranolol PRN (monitor BP / side effects closely). Could consider alternative SSRI/SNRI (eg. Venlafaxine, citalopram) or gabapentin in  future if needed. Continue in therapy. Follow-up with this provider and Behavioral Health Consultant in 4 weeks or sooner as needed. Patient agreeable to plan.       Medication side effects and alternatives reviewed. Health promotion activities recommended and reviewed today. All questions addressed. Education and counseling completed regarding risks and benefits of medications and psychotherapy options. Recommend therapy for additional support.     Treatment Plan:  DECREASE to fluoxetine 60 mg (40 + 20 mg) every day. Script sent.  START propranolol 10 mg as needed per last provider.   Monitor BP at home as possible.  CONTINUE hydroxyzine 25-50 mg as needed per last provider.   Continue  all other medications per primary care provider.   Continue therapy with established provider.  Safety plan reviewed. To the Emergency Department as needed or call after hours crisis line at [phone] or [phone]. [place] Crisis Text Line: Text [place] to 654779 or  Suicide LifeLine Chat: suicidepreventionDynatherm Medicalline.org/chat  Schedule an appointment with me and Behavioral Health Consultant in 4 weeks or sooner as needed.  Call [facility] at [phone] to schedule.  Follow up with primary care provider as planned or sooner if needed for acute medical concerns.  Call the psychiatric nurse line with medication questions or concerns at [phone].  MyChart may be used to communicate with your provider, but this is not intended to be used for emergencies.    Patient Education:  Medication side effects and alternatives reviewed. Health promotion activities recommended and reviewed today. All questions addressed. Education and counseling completed regarding risks and benefits of medications and psychotherapy options.  Consent provided by patient/guardian  Call the psychiatric nurse line with medication questions or concerns at [phone].  MyChart may be used to communicate with your provider, but this is not  intended to be used for emergencies.  Medlineplus.gov is information for patients.  It is run by the [employer] and it contains information about all disorders, diseases and all medications.      Side effects for SSRI: sexual dysfunction, decreased appetite, increased appetite, nausea, diarrhea, constipation, dry mouth, insomnia, sedation, agitation, tremors, headaches, dizziness, sweating, bruising and rare bleeding, discontinuation syndrome, rare hyponatremia, rare induction of azalea, suicidal ideations, and serotonin syndrome.    Discussed use of propranolol as off label for anxiety, and side effects to monitor including low HR, BP, lightheadedness or dizziness.    Community Resources:    National Suicide Prevention Lifeline: 787.686.6575 (TTY: 159.697.1488). Call anytime for help.  (www.suicidepreventionlifeline.org)  National [place] on Mental Illness (www.lola.org): 598.439.6718 or 438-281-2907.   Mental Health Association (www.mentalhealth.org): 371.949.2763 or 358-978-9810.  [place] Crisis Text Line: Text [place] to 560455  Suicide LifeLine Chat: suicideFertility Focus.org/chat    Administrative Billing:       Level of Medical Decision Making:   - At least 1 chronic problem that is not stable  - Engaged in prescription drug management during visit (discussed any medication benefits, side effects, alternatives, etc.)           Patient Status:  CCPS MD/DO/NP/PA providers offer care a specialty service for Primary Care Providers in the [facility] that seek to optimize psychotropic medications for unstable patients.  Once medications have been optimized, our providers discharge the patient back to the referring Primary Care Provider for ongoing medication management.  This type of system allows our providers to serve a high volume of patients.   Patient will continue to be seen for ongoing consultation and stabilization.    Signed:   Corrine Cox, MSN, APRN, PMHNP-BC  Collaborative Care  Psychiatry Service (CCPS)  [facility]    Chart documentation done in part with Dragon Voice Recognition software.  Although reviewed after completion, some word and grammatical errors may remain.

## 2025-05-01 ENCOUNTER — VIRTUAL VISIT (OUTPATIENT)
Dept: BEHAVIORAL HEALTH | Facility: CLINIC | Age: 50
End: 2025-05-01
Payer: COMMERCIAL

## 2025-05-01 ENCOUNTER — VIRTUAL VISIT (OUTPATIENT)
Dept: PSYCHIATRY | Facility: CLINIC | Age: 50
End: 2025-05-01
Attending: STUDENT IN AN ORGANIZED HEALTH CARE EDUCATION/TRAINING PROGRAM
Payer: COMMERCIAL

## 2025-05-01 DIAGNOSIS — F33.41 RECURRENT MAJOR DEPRESSIVE DISORDER, IN PARTIAL REMISSION: ICD-10-CM

## 2025-05-01 DIAGNOSIS — F43.22 ADJUSTMENT DISORDER WITH ANXIOUS MOOD: Primary | ICD-10-CM

## 2025-05-01 DIAGNOSIS — F41.1 GENERALIZED ANXIETY DISORDER: Primary | ICD-10-CM

## 2025-05-01 DIAGNOSIS — F43.9 TRAUMA AND STRESSOR-RELATED DISORDER: ICD-10-CM

## 2025-05-01 ASSESSMENT — ANXIETY QUESTIONNAIRES
7. FEELING AFRAID AS IF SOMETHING AWFUL MIGHT HAPPEN: SEVERAL DAYS
2. NOT BEING ABLE TO STOP OR CONTROL WORRYING: SEVERAL DAYS
2. NOT BEING ABLE TO STOP OR CONTROL WORRYING: SEVERAL DAYS
3. WORRYING TOO MUCH ABOUT DIFFERENT THINGS: SEVERAL DAYS
6. BECOMING EASILY ANNOYED OR IRRITABLE: SEVERAL DAYS
GAD7 TOTAL SCORE: 8
7. FEELING AFRAID AS IF SOMETHING AWFUL MIGHT HAPPEN: SEVERAL DAYS
GAD7 TOTAL SCORE: 8
1. FEELING NERVOUS, ANXIOUS, OR ON EDGE: NEARLY EVERY DAY
5. BEING SO RESTLESS THAT IT IS HARD TO SIT STILL: NOT AT ALL
3. WORRYING TOO MUCH ABOUT DIFFERENT THINGS: SEVERAL DAYS
7. FEELING AFRAID AS IF SOMETHING AWFUL MIGHT HAPPEN: SEVERAL DAYS
GAD7 TOTAL SCORE: 8
GAD7 TOTAL SCORE: 8
5. BEING SO RESTLESS THAT IT IS HARD TO SIT STILL: NOT AT ALL
6. BECOMING EASILY ANNOYED OR IRRITABLE: SEVERAL DAYS
4. TROUBLE RELAXING: SEVERAL DAYS
1. FEELING NERVOUS, ANXIOUS, OR ON EDGE: NEARLY EVERY DAY
7. FEELING AFRAID AS IF SOMETHING AWFUL MIGHT HAPPEN: SEVERAL DAYS
4. TROUBLE RELAXING: SEVERAL DAYS

## 2025-05-01 ASSESSMENT — PATIENT HEALTH QUESTIONNAIRE - PHQ9
SUM OF ALL RESPONSES TO PHQ QUESTIONS 1-9: 8
10. IF YOU CHECKED OFF ANY PROBLEMS, HOW DIFFICULT HAVE THESE PROBLEMS MADE IT FOR YOU TO DO YOUR WORK, TAKE CARE OF THINGS AT HOME, OR GET ALONG WITH OTHER PEOPLE: SOMEWHAT DIFFICULT
SUM OF ALL RESPONSES TO PHQ QUESTIONS 1-9: 8
10. IF YOU CHECKED OFF ANY PROBLEMS, HOW DIFFICULT HAVE THESE PROBLEMS MADE IT FOR YOU TO DO YOUR WORK, TAKE CARE OF THINGS AT HOME, OR GET ALONG WITH OTHER PEOPLE: SOMEWHAT DIFFICULT

## 2025-05-01 NOTE — PROGRESS NOTES
"Collaborative Care Psychiatry Service  Provider Name: LORI Barclay, [facility]    PATIENT'S NAME: Shauna Galdamez  PREFERRED NAME: Shauna  PREFERRED PRONOUNS:    MRN:   [id number]  :   1975   ACCT. NUMBER: [id number]  DATE OF SERVICE: 25  START TIME: 1235pm  END TIME: 1259pm    BRIEF ADULT DIAGNOSTIC ASSESSMENT    Telemedicine Visit: The patient's condition can be safely assessed and treated via synchronous audio and visual telemedicine encounter.      Reason for Telemedicine Visit: Patient has requested telehealth visit    Originating Site (Patient Location): Patient's home    Distant Site (Provider Location): Provider Remote Setting- Home Office    Consent:  The patient/guardian has verbally consented to: the potential risks and benefits of telemedicine (video visit) versus in person care; bill my insurance or make self-payment for services provided; and responsibility for payment of non-covered services.     Mode of Communication:  Video Conference via [employer]    As the provider I attest to compliance with applicable laws and regulations related to telemedicine.    Identifying Information:  Patient is a 49 year old, .  The pronoun use throughout this assessment reflects the patient's chosen pronoun.  Patient was referred for an assessment by primary care provider.  Patient attended the session alone.     Chief Complaint:   The reason for seeking services at this time is: \"Worsening anxiety and perimenopause\"   The problem(s) began recently. Patient has attempted to resolve these concerns in the past through therapy, PCP, PHARM D, OBGYN, PCP .    Does the client have any condition that is currently presenting as a potential to harm themselves or others (severe withdrawal, serious medical condition, severe emotional/behavioral problem)? No.  Proceed with assessment.    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     Review of Symptoms per patient report:  Depression: Feeling sad, down, or depressed, Difficulties concentrating, Feelings of helplessness, and Irritability, SIB- no in lifetime, SI- no in lifetime  Génesis:  No Symptoms  Psychosis: No Symptoms  Anxiety: Excessive worry, Nervousness, Social anxiety, Poor concentration, and Irritability, anxiety with going to classroom, headaches, tense jaw   Panic:  In last 2 months, nothing in the last month  Post Traumatic Stress Disorder:  Experienced traumatic event ACES no, 3 adopted kids who have trauma,  was out of a job    Eating Disorder: No Symptoms, 3 times in the last month will have a tense stomach and throwing up   ADD / ADHD:  Inattentive, Difficulties listening, Poor organizational skills, Distractibility, Forgetful, and Interrupts  Conduct Disorder: No symptoms  Autism Spectrum Disorder: No symptoms  Obsessive Compulsive Disorder: No Symptoms    Sleep: \"not great, up for an hour or two nightly around 203 am related to perimenopause, high exhaustion during the day and will take naps, hard to hear andgo to bed by 830-9 and sleep until 2-3am and then get several other hours    Caffeine:half a cup in am   Tobacco: pt denies    Current alcohol use: like wine, drinking it less and will disturb sleep,socially only,  last couple weeks have tried THC drinks to see how they work, 1st time was good and 2nd time made pt feel like she couldn't follow conversations  Current drug use: pt deniesm, THC drink above only     Rating Scales:  PHQ-9:       9/28/2023    10:33 AM 10/3/2024    10:02 AM 5/1/2025    12:20 PM   [facility] Follow-up to PHQ   PHQ-9 9. Suicide Ideation past 2 weeks Not at all Not at all Not at all        Proxy-reported      GAD7:        9/28/2023    10:33 AM " 10/3/2024     9:34 AM 5/1/2025    12:26 PM   SUKHI-7 SCORE   Total Score  4 (minimal anxiety) 8 (mild anxiety)   Total Score 8 4 8        Proxy-reported     CAGE:        5/1/2025    12:27 PM   CAGE-AID Flowsheet   Have you ever felt you should Cut down on your drinking or drug use? 0    Have people Annoyed you by criticizing your drinking or drug use? 0    Have you ever felt bad or Guilty about your drinking or drug use? 0    Have you ever had a drink or used drugs first thing in the morning to steady your nerves or to get rid of a hangover? (Eye opener) 0    CAGE-AID SCORE 0    Have you ever felt you should Cut down on your drinking or drug use? No   Have people Annoyed you by criticizing your drinking or drug use? No   Have you ever felt bad or Guilty about your drinking or drug use? No   Have you ever had a drink or used drugs first thing in the morning to steady your nerves or to get rid of a hangover? (Eye opener) No   CAGE-AID SCORE 0 (A total score of 2 or greater is considered clinically significant)       Proxy-reported       Assessments:  The following assessments were completed by patient for this visit:  PHQ9:       6/29/2018     9:21 AM 10/5/2018    10:17 AM 10/20/2022    11:42 AM 9/28/2023    10:33 AM 10/3/2024    10:02 AM 5/1/2025    12:20 PM   PHQ-9 SCORE   PHQ-9 Total Score MyChart   4 (Minimal depression)   8 (Mild depression)   PHQ-9 Total Score 1 3 4 7 1 8        Proxy-reported     GAD2:       5/1/2025    12:26 PM   SUKHI-2   Feeling nervous, anxious, or on edge 3    Not being able to stop or control worrying 1    SUKHI-2 Total Score 4        Proxy-reported     GAD7:       6/29/2018     9:21 AM 10/5/2018    10:17 AM 10/20/2022    11:42 AM 1/2/2023    10:58 AM 9/28/2023    10:33 AM 10/3/2024     9:34 AM 5/1/2025    12:26 PM   SUKHI-7 SCORE   Total Score   2 (minimal anxiety) 0 (minimal anxiety)  4 (minimal anxiety) 8 (mild anxiety)   Total Score 0 2 2 0 8 4 8        Proxy-reported     CAGE-AID:        5/1/2025    12:27 PM   CAGE-AID Total Score   Total Score 0    Total Score MyChart 0 (A total score of 2 or greater is considered clinically significant)       Proxy-reported     PROMIS 10-Global Health (only subscores and total score):       5/1/2025    12:23 PM   PROMIS-10 Scores Only   Global Mental Health Score 11    Global Physical Health Score 14    PROMIS TOTAL - SUBSCORES 25        Proxy-reported     [place] Suicide Severity Rating Scale (Lifetime/Recent)      5/7/2024     8:45 AM 5/1/2025     1:09 PM   [place] Suicide Severity Rating (Lifetime/Recent)   Q1 Wished to be Dead (Past Month) 0-->no    Q2 Suicidal Thoughts (Past Month) 0-->no    Q6 Suicide Behavior (Lifetime) 0-->no    Level of Risk per Screen no risks indicated    1. Wish to be Dead (Lifetime)  N   1. Wish to be Dead (Past 1 Month)  N   2. Non-Specific Active Suicidal Thoughts (Lifetime)  N   Actual Attempt (Lifetime)  N   Has subject engaged in non-suicidal self-injurious behavior? (Lifetime)  N   Interrupted Attempts (Lifetime)  N   Aborted or Self-Interrupted Attempt (Lifetime)  N   Preparatory Acts or Behavior (Lifetime)  N   Calculated C-SSRS Risk Score (Lifetime/Recent)  No Risk Indicated         Personal Medical History:  Past Medical History:   Diagnosis Date    Anxiety     Cherry angioma 2/6/2017    Closed fracture of tibia 1/13/2016    Depression     Mixed conductive and sensorineural hearing 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 7/9/2021    Added automatically from request for surgery [id number]    Perforation of tympanic membrane, bilateral 7/9/2021    Added automatically from request for surgery [id number]    Solar lentiginosis 2/6/2017       Patient has received mental health services in the past:  therapy .  Psychiatric Hospitalizations: None.  Patient denies a history of civil commitment. Currently, patient is receiving other mental health services.  These include  therapy .     Patient does not report a history of  head injury / trauma / cognitive impairment / seizures.      Current Medications:  Current Outpatient Medications   Medication Sig Dispense Refill    clonazePAM (KLONOPIN) 0.5 MG tablet TAKE 1/2 TO 1 TABLET BY MOUTH 2-3 TIMES DAILY AS NEEDED FOR ANXIETY/PANIC      estradiol (VIVELLE-DOT) 0.05 MG/24HR bi-weekly patch Place 1 patch onto the skin twice a week. 24 patch 4    fluocinonide (LIDEX) 0.05 % external solution 1 APPLICATION TWICE A DAY TOPICALLY X 7 DAYS THEN TWICE A WEEK AS NEEDED      FLUoxetine (PROZAC) 40 MG capsule Take 2 capsules (80 mg) by mouth daily. 90 capsule 1    hydrOXYzine HCl (ATARAX) 25 MG tablet Take 1-2 tablets (25-50 mg) by mouth 3 times daily as needed for anxiety. 90 tablet 1    losartan-hydrochlorothiazide (HYZAAR) 50-12.5 MG tablet Take 1 tablet by mouth every morning      progesterone (PROMETRIUM) 100 MG capsule Take 1 capsule (100 mg) by mouth daily. 90 capsule 4    progesterone (PROMETRIUM) 200 MG capsule Take 1 capsule (200 mg) by mouth daily. for 14 days of the month (in a row). 90 capsule 1    propranolol (INDERAL) 10 MG tablet Take 1 tablet (10 mg) by mouth 3 times daily as needed (anxiety). 90 tablet 2        Allergies:  Allergies   Allergen Reactions    Sulfa Antibiotics Unknown    Bupropion Rash     Annotation: rash and joint pain         Family Psychiatric History:  Patient did report a family history of mental health concerns.     Family History       Problem (# of Occurrences) Relation (Name,Age of Onset)    Substance Abuse (1) Father    Mental Illness (1) Mother    Heart Disease (1) Maternal Grandmother    Hypertension (1) Mother    Thyroid Disease (1) Mother    Skin Cancer (1) Mother: BCC    Stomach Problem (1) Mother    No Known Problems (5) Sister, Brother, Maternal Grandfather, Paternal Grandmother, Other           Negative family history of: Melanoma, Anesthesia Reaction, Deep Vein Thrombosis (DVT)            Social/Family History:  Patient reported  born in [place]  [place] .  They were raised by biological parents. Patient reported that   childhood was \"oden idealic.\"  Patient does not experiencing childhood abuse/neglect. Patient described their current relationships with family of origin as \"good\".      The patient has been  1x times and has 3 children. Pt  described the relationship with   spouse as, \"pretty good, were working on it.\" Patient reported having extensive good friends.     Cultural influences and impact on patient's life structure, values, norms, and healthcare: Racial or Ethnic Self-Identification white, Time Orientation: On time, Social Orientation: Has many friends, Verbal / Non-verbal Communication Style: Able to understand and reciprocate, and Spiritual Beliefs: Patient denies being Scientology or spiritual . Patient identified their preferred language to be English. Patient reported they does not need the assistance of an  or other support involved in treatment.       Educational/Occupational History:  Patient reported   highest education level was  PhD . The patient did not serve in the . Patient is currently employed full time and reports they are not able to function appropriately at work. Memory and shaking and heightening anxiety before going into the classroom.       Social History     Socioeconomic History    Marital status:      Spouse name: Not on file    Number of children: Not on file    Years of education: Not on file    Highest education level: Not on file   Occupational History    Not on file   Tobacco Use    Smoking status: Never    Smokeless tobacco: Never   Vaping Use    Vaping status: Never Used   Substance and Sexual Activity    Alcohol use: Yes     Comment: < 1 glass of wine/night    Drug use: No    Sexual activity: Yes     Partners: Male     Birth control/protection: None   Other Topics Concern    Parent/sibling w/ CABG, MI or angioplasty before 65F 55M? Not Asked   Social History Narrative    How much " exercise per week? Few walks and yoga    How much calcium per day? Through foods       How much caffeine per day? Two cups coffee    How much vitamin D per day? Through foods    Do you/your family wear seatbelts?  Yes    Do you/your family use safety helmets? Yes    Do you/your family use sunscreen? Yes    Do you/your family keep firearms in the home? No    Do you/your family have a smoke detector(s)? Yes                     Social Drivers of Health     Financial Resource Strain: Low Risk  (9/5/2023)    Received from [facility]    Overall Financial Resource Strain (CARDIA)     Difficulty of Paying Living Expenses: Not very hard   Food Insecurity: No Food Insecurity (9/5/2023)    Received from [facility]    Hunger Vital Sign     Worried About Running Out of Food in the Last Year: Never true     Ran Out of Food in the Last Year: Never true   Transportation Needs: No Transportation Needs (9/5/2023)    Received from [facility]    PRAPARE - Transportation     Lack of Transportation (Medical): No     Lack of Transportation (Non-Medical): No   Physical Activity: Insufficiently Active (9/5/2023)    Received from [facility]    Exercise Vital Sign     Days of Exercise per Week: 2 days     Minutes of Exercise per Session: 50 min   Stress: Not on file   Social Connections: Unknown (5/3/2023)    [facility]    Social Connections     Frequency of Communication with Friends and Family: Not on file   Interpersonal Safety: Not on file   Housing Stability: Low Risk  (9/5/2023)    Received from [facility]    Housing Stability     What is your living situation today?: I have a steady place to live       Patient reported that they have not been involved with the legal system.  Patient denies being on probation / parole / under the jurisdiction of the court.    Current Mental  Status Exam:   Appearance:   Appropriate    Eye Contact:   Good   Psychomotor:   Normal   Attitude / Demeanor:  Cooperative   Speech      Rate / Production:  Normal/ Responsive      Volume:   Normal  volume      Language:   intact  Mood:    Anxious  Irritable   Affect:    Appropriate    Thought Content:  Clear   Thought Process:  Coherent  Logical       Associations:  No loosening of associations  Insight:    Good   Judgment:   Intact   Orientation:   All  Attention/concentration: Good      Safety Assessment:   Current Safety Concerns:  [place] Suicide Severity Rating Scale (Lifetime/Recent)      5/7/2024     8:45 AM 5/1/2025     1:09 PM   [place] Suicide Severity Rating (Lifetime/Recent)   Q1 Wished to be Dead (Past Month) 0-->no    Q2 Suicidal Thoughts (Past Month) 0-->no    Q6 Suicide Behavior (Lifetime) 0-->no    Level of Risk per Screen no risks indicated    1. Wish to be Dead (Lifetime)  N   1. Wish to be Dead (Past 1 Month)  N   2. Non-Specific Active Suicidal Thoughts (Lifetime)  N   Actual Attempt (Lifetime)  N   Has subject engaged in non-suicidal self-injurious behavior? (Lifetime)  N   Interrupted Attempts (Lifetime)  N   Aborted or Self-Interrupted Attempt (Lifetime)  N   Preparatory Acts or Behavior (Lifetime)  N   Calculated C-SSRS Risk Score (Lifetime/Recent)  No Risk Indicated     Patient denies current homicidal ideation and behaviors.  Patient denies current self-injurious ideation and behaviors.    Patient denied risk behaviors associated with substance use.  Patient denies any high risk behaviors associated with mental health symptoms.  Patient reports the following current concerns for their personal safety: None.  Patient reports there are not firearms in the house. There are no firearms in the home.    History of Safety Concerns:  Patient denied a history of homicidal ideation.     Patient denied a history of personal safety concerns.    Patient denied a history of assaultive behaviors.     Patient denied a history of sexual assault behaviors.     Patient denied a history of risk behaviors associated with substance use.  Patient denies any history of high risk behaviors associated with mental health symptoms.  Patient reports the following protective factors: forward/future oriented thinking, dedication to family/friends, safe and stable environment, help seeking behaviors when distressed  , adherence with prescribed medication, living with other people, daily obligations, effective problem-solving skills, committment to well-being, sense of meaning, strong sense of self-worth/esteem, and sense of personal control or determination    Risk Plan:  See Preliminary Treatment Plan for Safety and Risk Management Plan    Diagnosis:  Diagnostic Criteria:   Adjustment Disorder  A. The development of emotional or behavioral symptoms in response to an identifiable stressor(s) occurring within 3 months of the onset of the stressor(s)  B. These symptoms or behaviors are clinically significant, as evidenced by one or both of the following:       - Marked distress that is out of proportion to the severity/intensity of the stressor (with consideration for external context & culture)       - Significant impairment in social, occupational, or other important areas of functioning  C. The stress-related disturbance does not meet criteria for another disorder & is not not an exacerbation of another mental disorder  D. The symptoms do not represent normal bereavement  E. Once the stressor or its consequences have terminated, the symptoms do not persist for more than an additional 6 months       * Adjustment Disorder with Anxiety: The predominant manfestations are symptoms such as nervousness, worry, or jitteriness, or, in children separation anxiety from major attachment figures  Unspecified Trauma- and Stressor-Related Disorder, Symptoms characteristic of a trauma and stressor related disorder that cause clinically  significant distress or impairment in social, occupational, or other important areas of functioning predominate but do not meet the full criteria for any of the disorders in the trauma and stressor related disorders diagnostic class.     Diagnoses:  1. Adjustment disorder with anxious mood    2. Trauma and stressor-related disorder        Patient's Strengths and Limitations:  Patient identified the following strengths or resources that will help them succeed in treatment: commitment to health and well being, friends / good social support, family support, insight, intelligence, motivation, sense of humor, strong social skills, and work ethic. Things that may interfere with the patient's success in treatment include: none identified.     Recommendations:     1. Plan for Safety and Risk Management:Recommended that patient call 911 or go to the local ED should there be a change in any of these risk factors.  Report to child / adult protection services was N/A.      2. Resources/Service Plan:       services are not indicated.     Modifications to assist communication are not indicated.     Additional disability accommodations are not indicated.      3. Collaboration:  Collaboration / coordination of treatment will be initiated with the following support professionals: primary care physician and psychiatry.      4.  Referrals:   The following referral(s) will be initiated:  TBD .       Staff Name/Credentials:  LORI Barclay, [facility]  May 1, 2025

## 2025-05-01 NOTE — PATIENT INSTRUCTIONS
**For crisis resources, please see the information at the end of this document**     Thank you for coming to the [facility].    TREATMENT PLAN:  CCPS MD/DO/NP/PA providers offer care a specialty service for Primary Care Providers in the [facility] that seek to optimize psychotropic medications for unstable patients.  Once medications have been optimized, our providers discharge the patient back to the referring Primary Care Provider for ongoing medication management.  This type of system allows our providers to serve a high volume of patients.     Medications:   DECREASE to fluoxetine 60 mg (40 + 20 mg) every day. Script sent.  START propranolol 10 mg as needed per last provider.   Monitor BP at home as possible.  CONTINUE hydroxyzine 25-50 mg as needed per last provider.   Continue  all other medications per primary care provider.     Consults / Referrals:   Continue therapy with established provider.    Follow-up:  Schedule an appointment with me and Behavioral Health Consultant in 4 weeks or sooner as needed.  Call [facility] at [phone] to schedule.  Follow up with primary care provider as planned or sooner if needed for acute medical concerns.  Call the psychiatric nurse line with medication questions or concerns at [phone].  "[employer]hart may be used to communicate with your provider, but this is not intended to be used for emergencies.    Psychoeducation:  Side effects for SSRI: sexual dysfunction, decreased appetite, increased appetite, nausea, diarrhea, constipation, dry mouth, insomnia, sedation, agitation, tremors, headaches, dizziness, sweating, bruising and rare bleeding, discontinuation syndrome, rare hyponatremia, rare induction of azalea, suicidal ideations, and serotonin syndrome.    Discussed use of propranolol as off label for anxiety, and side effects to monitor including low HR, BP, lightheadedness or dizziness.      Financial  Assistance [phone]  [facility] Billing [phone]  Central Billing Office, [facility]: [phone]  [facility] Billing [phone]  Medical Records [phone]  [facility] Patient Bill of Rights [email or web address]       MENTAL HEALTH CRISIS RESOURCES:  For a emergency help, please call 911 or go to the nearest Emergency Department.     Emergency Walk-In Options:   EmPATH Unit @ [facility] ([place]): [phone] - Specialized mental health emergency area designed to be calming  [facility] ([place]): [phone]  [facility] ([place]): [phone]  [facility] ([place]): [phone]    [facility] Crisis Information:   [place]: [phone]  [place]: [phone]  [place] (COPE) - Adult: [phone]     Child: [phone]  [place] - Adult: [phone]     Child: [phone]  [place]: [phone]  List of all [facility] resources:   https://[place].gov/dhs/people-we-serve/adults/health-care/mental-health/resources/crisis-contacts.jsp    National Crisis Information:   National Suicide & Crisis Lifeline: Call 168        For online chat options, visit https://suicidepreventionlifeline.org/chat/  Poison Control Center: 8-656-844-7451  Poison Control Center: 5-600-526-0118  Trans Lifeline: 1-451.797.1051 - Hotline for transgender people of all ages  The [employer]: 9-109-046-6943 - Hotline for LGBT youth     For Non-Emergency Support:   Fast Tracker: Mental Health & Substance Use Disorder Resources -   https://www.fasttrackermn.org/       Again thank you for choosing [facility] MENTAL HEALTH & ADDICTION [facility] and please let us know how we can best partner with you to improve you and your family's health.    You may be receiving a survey regarding this appointment. We would love to have your feedback, both positive and negative. The survey is done by an external company, so  "your answers are anonymous.        Patient Education   Collaborative Care Psychiatry Service  What to Expect  Here's what to expect from your Collaborative Care Psychiatry Service (CCPS).   About CCPS  CCPS means 2 people work together to help you get better. You'll meet with a behavioral health clinician and a psychiatric doctor. A behavioral health clinician helps people with mental health problems by talking with them. A psychiatric doctor helps people by giving them medicine.  How it works  At every visit, you'll see the behavioral health clinician (BHC) first. They'll talk with you about how you're doing and teach you how to feel better.   Then you'll see the psychiatric doctor. This doctor can help you deal with troubling thoughts and feelings by giving you medicine. They'll make sure you know the plan for your care.   CCPS usually takes 3 to 6 visits. If you need more visits, we may have you start seeing a different psychiatric doctor for ongoing care.  If you have any questions or concerns, we'll be glad to talk with you.  About visits  Be open  At your visits, please talk openly about your problems. It may feel hard, but it's the best way for us to help you.  Cancelling visits  If you can't come to your visit, please call us right away at [phone]. If you don't cancel at least 24 hours (1 full day) before your visit, that's \"late cancellation.\"  Being late to visits  Being very late is the same as not showing up. You will be a \"no show\" if:  Your appointment starts with a BHC, and you're more than 15 minutes late for a 30-minute (half hour) visit. This will also cancel your appointment with the psychiatric doctor.  Your appointment is with a psychiatric doctor only, and you're more than 15 minutes late for a 30-minute (half hour) visit.  Your appointment is with a psychiatric doctor only, and you're more than 30 minutes late for a 60-minute (full hour) visit.  If you cancel late or don't show up 2 " times within 6 months, we may end your care.   Getting help between visits  If you need help between visits, you can call us Monday to Friday from 8 a.m. to 4:30 p.m. at [phone].  Emergency care  Call 911 or go to the nearest emergency department if your life or someone else's life is in danger.  Call 988 anytime to reach the national Suicide and Crisis hotline.  Medicine refills  To refill your medicine, call your pharmacy. You can also call [facility]'s Behavioral Access at [phone], Monday to Friday, 8 a.m. to 4:30 p.m. It can take 1 to 3 business days to get a refill.   Forms, letters, and tests  You may have papers to fill out, like FMLA, short-term disability, and workability. We can help you with these forms at your visits, but you must have an appointment. You may need more than 1 visit for this, to be in an intensive therapy program, or both.  Before we can give you medicine for ADHD, we may refer you to get tested for it or confirm it another way.  We may not be able to give you an emotional support animal letter.  We don't do mental health checks ordered by the court.   We don't do mental health testing, but we can refer you to get tested.   Thank you for choosing us for your care.  For informational purposes only. Not to replace the advice of your health care provider. Copyright   2022 [facility]. All rights reserved. 6connect 613900 - Rev 11/24.

## 2025-05-01 NOTE — NURSING NOTE
Current patient location: [address] [place] [address]    Is the patient currently in the state of [place]? YES    Visit mode: VIDEO    If the visit is dropped, the patient can be reconnected by:VIDEO VISIT: Text to cell phone:   Telephone Information:   Mobile [phone]       Will anyone else be joining the visit? NO  (If patient encounters technical issues they should call [phone])    Are changes needed to the allergy or medication list? Pt stated no changes to allergies and Pt stated no med changes    Are refills needed on medications prescribed by this physician? Discuss with provider    Rooming Documentation:  VF did all qnrs with pt except intake form    Reason for visit: Consult    Melvina CHRISTIANSONF

## 2025-05-06 ENCOUNTER — PATIENT OUTREACH (OUTPATIENT)
Dept: CARE COORDINATION | Facility: CLINIC | Age: 50
End: 2025-05-06
Payer: COMMERCIAL

## 2025-06-02 ENCOUNTER — VIRTUAL VISIT (OUTPATIENT)
Dept: BEHAVIORAL HEALTH | Facility: CLINIC | Age: 50
End: 2025-06-02
Payer: COMMERCIAL

## 2025-06-02 ENCOUNTER — VIRTUAL VISIT (OUTPATIENT)
Dept: PSYCHIATRY | Facility: CLINIC | Age: 50
End: 2025-06-02
Payer: COMMERCIAL

## 2025-06-02 DIAGNOSIS — F43.22 ADJUSTMENT DISORDER WITH ANXIOUS MOOD: Primary | ICD-10-CM

## 2025-06-02 DIAGNOSIS — F41.1 GENERALIZED ANXIETY DISORDER: ICD-10-CM

## 2025-06-02 DIAGNOSIS — F33.41 RECURRENT MAJOR DEPRESSIVE DISORDER, IN PARTIAL REMISSION: ICD-10-CM

## 2025-06-02 PROCEDURE — 98006 SYNCH AUDIO-VIDEO EST MOD 30: CPT | Performed by: NURSE PRACTITIONER

## 2025-06-02 PROCEDURE — 90832 PSYTX W PT 30 MINUTES: CPT | Mod: 95 | Performed by: COUNSELOR

## 2025-06-02 ASSESSMENT — PAIN SCALES - GENERAL: PAINLEVEL_OUTOF10: NO PAIN (0)

## 2025-06-02 NOTE — PROGRESS NOTES
[facility]    Behavioral Health Clinician Progress Note   Mental Health & Addiction Services      Monday June 02, 2025    Patient Name: Shauna Galdamez       Service Type:  Individual   Service Location:  [employer]hart / Email (patient reached)   Visit Start Time: 2:33 PM  Visit End Time:  2:53 PM   Session Length: 16 - 37    Attendees: Patient   Service Modality: Video Visit:      Provider verified identity through the following two step process.  Patient provided:  Patient photo, Patient is known previously to provider, and Patient was verified at admission/transfer    Telemedicine Visit: The patient's condition can be safely assessed and treated via synchronous audio and visual telemedicine encounter.      Reason for Telemedicine Visit: Patient convenience (e.g. access to timely appointments / distance to available provider)    Originating Site (Patient Location): Patient's home    Distant Site (Provider Location): Provider Remote Setting- Home Office    Consent:  The patient/guardian has verbally consented to: the potential risks and benefits of telemedicine (video visit) versus in person care; bill my insurance or make self-payment for services provided; and responsibility for payment of non-covered services.     Patient would like the video invitation sent by:  My Chart    Mode of Communication:  Video Conference via [employer]    Distant Location (Provider):  Off-site    As the provider I attest to compliance with applicable laws and regulations related to telemedicine.   Visit number: 2    [facility] Visit Activities (Refresh list every visit): [facility] Only     Date of Brief Diagnostic Assessment : 5/1/2025  Treatment Plan Review Date:     DATA:    Extended Session (60+ minutes): No   Interactive Complexity: No   Crisis: No   [facility] Patient: No     Assessments completed:  The following assessments were completed by patient for this visit:  PHQ9:       6/29/2018      9:21 AM 10/5/2018    10:17 AM 10/20/2022    11:42 AM 9/28/2023    10:33 AM 10/3/2024    10:02 AM 5/1/2025    12:20 PM   PHQ-9 SCORE   PHQ-9 Total Score MyChart   4 (Minimal depression)   8 (Mild depression)   PHQ-9 Total Score 1  3  4 7 1 8        Proxy-reported    Data saved with a previous flowsheet row definition     GAD2:       5/1/2025    12:26 PM 6/2/2025     2:17 PM   SUKHI-2   Feeling nervous, anxious, or on edge 3  1    Not being able to stop or control worrying 1  1    SUKHI-2 Total Score 4  2        Proxy-reported     GAD7:       6/29/2018     9:21 AM 10/5/2018    10:17 AM 10/20/2022    11:42 AM 1/2/2023    10:58 AM 9/28/2023    10:33 AM 10/3/2024     9:34 AM 5/1/2025    12:26 PM   SUKHI-7 SCORE   Total Score   2 (minimal anxiety) 0 (minimal anxiety)  4 (minimal anxiety) 8 (mild anxiety)   Total Score 0 2 2 0 8 4 8        Proxy-reported     CAGE-AID:       5/1/2025    12:27 PM   CAGE-AID Total Score   Total Score 0    Total Score MyChart 0 (A total score of 2 or greater is considered clinically significant)       Proxy-reported     PROMIS 10-Global Health (only subscores and total score):       5/1/2025    12:23 PM   PROMIS-10 Scores Only   Global Mental Health Score 11    Global Physical Health Score 14    PROMIS TOTAL - SUBSCORES 25        Proxy-reported     [place] Suicide Severity Rating Scale (Lifetime/Recent)      5/7/2024     8:45 AM 5/1/2025     1:09 PM   [place] Suicide Severity Rating (Lifetime/Recent)   Q1 Wished to be Dead (Past Month) 0-->no    Q2 Suicidal Thoughts (Past Month) 0-->no    Q6 Suicide Behavior (Lifetime) 0-->no    Level of Risk per Screen no risks indicated    1. Wish to be Dead (Lifetime)  N   1. Wish to be Dead (Past 1 Month)  N   2. Non-Specific Active Suicidal Thoughts (Lifetime)  N   Actual Attempt (Lifetime)  N   Has subject engaged in non-suicidal self-injurious behavior? (Lifetime)  N   Interrupted Attempts (Lifetime)  N   Aborted or Self-Interrupted Attempt (Lifetime)  N  "  Preparatory Acts or Behavior (Lifetime)  N   Calculated C-SSRS Risk Score (Lifetime/Recent)  No Risk Indicated        Reason for Visit/Presenting Concern:  Anxiety    Current Stressors / Issues:  Better/worse: \"am feeling better reducing Prozac\", there are still stressful days that were destined to be stressful and pt was able to manage them, no changes in being able to remember or recall things, has classes that are online now and the anxiety initially heightened during being in the school, less inattention, distractibility is slightly improving, jaw tension is still present and has slightly improved  ADLs: no throwing up or tension, still exhausted and taking naps though slightly better, avg 8-10 hours still interrupted, appetite- no concerns, hygiene- no concerns     Mood: \"better\"  Substance use: a lot less wine   Therapist: every other week individual- is going well; and couples therapy and parenting therapy   [facility] recommendations: ice facial roller, Craniosacral message, Spending 45 sec in nature can boost mood and focus, Mindfulness notice things with the 5 senses    From CCPS intake 5/1/2024:  Reason for CCPS: therapist recommended OBGYN, \"anxiety has been really, really bad, external and internal shaking\", anxiety is new for pt with perimenopause, there have been more depressive sx, is affecting ability to think, remember things  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   Review of Symptoms per patient report:  Depression: Feeling sad, down, or depressed, Difficulties concentrating, Feelings of helplessness, and Irritability, SIB- no in lifetime, SI- no in lifetime  Anxiety: Excessive worry, Nervousness, Social anxiety, Poor concentration, and Irritability, anxiety with going to classroom, headaches, tense jaw   Panic:  In last 2 months, nothing in the last month  Post Traumatic Stress Disorder:  Experienced traumatic event ACES no, 3 adopted kids who have trauma,  was out of a " "job   Eating Disorder: No Symptoms, 3 times in the last month will have a tense stomach and throwing up   ADD / ADHD:  Inattentive, Difficulties listening, Poor organizational skills, Distractibility, Forgetful, and Interrupts  Sleep: \"not great, up for an hour or two nightly around 203 am related to perimenopause, high exhaustion during the day and will take naps, hard to hear andgo to bed by 830-9 and sleep until 2-3am and then get several other hours  Caffeine:half a cup in am   Current alcohol use: like wine, drinking it less and will disturb sleep,socially only,  last couple weeks have tried THC drinks to see how they work, 1st time was good and 2nd time made pt feel like she couldn't follow conversations  Current drug use: pt deniesm, THC drink above only     Therapeutic Interventions:  Cognitive Behavioral Therapy (CBT): Identified behavioral changes that can be made to move towards treatment goals. , Assisted patient in developing coping strategies (e.g. more physical exercise, less internal focus, increase social involvement, more assertiveness, etc.)., and Reinforced successes reported by patient since last appointment.  Mindfulness: Provided psycho-education around developing mindfulness strategies.     Response to treatment interventions:   Patient was receptive to interventions utilized.  Patient was engaged in the therapy process.   Patient's motivation increased.       Progress on Treatment Objective(s) / Homework:   Satisfactory progress - PREPARATION (Decided to change - considering how); Intervened by negotiating a change plan and determining options / strategies for behavior change, identifying triggers, exploring social supports, and working towards setting a date to begin behavior change     Medication Review:   No changes to current psychiatric medication(s)     Medication Compliance:   Yes     Chemical Use Review:  Substance Use: Chemical use reviewed, no active concerns identified  "     Tobacco Use: No current tobacco use.       Assessment: Current Emotional / Mental Status (status of significant symptoms):    Risk status (Self / Other harm or suicidal ideation)   Patient denies a history of suicidal ideation, suicide attempts, self-injurious behavior, homicidal ideation, homicidal behavior, and and other safety concerns   Patient denies current fears or concerns for personal safety.   Patient denies current or recent suicidal ideation or behaviors.   Patient denies current or recent homicidal ideation or behaviors.   Patient denies current or recent self injurious behavior or ideation.   Patient denies other safety concerns.   Recommended that patient call 911 or go to the local ED should there be a change in any of these risk factors      ASSESSMENT:   Mental Status:     Appearance:   Appropriate    Eye Contact:   Good    Psychomotor Behavior: Normal    Attitude:   Cooperative    Orientation:   All   Speech Rate / Production: Normal    Volume:   Normal    Mood:    Anxious  Sad    Affect:    Appropriate    Thought Content:  Clear    Thought Form:  Coherent  Logical    Insight:    Good          Diagnoses:   Adjustment disorder with anxious mood    Collateral Reports Completed:   Communicated with: Corrine Cox CNP       Plan: (Homework, other):   Patient was provided No indications of CD issues  Patient was given information about behavioral services and encouraged to schedule a follow up appointment with the clinic [facility] in 1 month.         BARB Barclay, [facility]  _____________________________________________________________________________________________________________________________________                                              Individual Treatment Plan    Patient's Name: Shauna Galdamez   YOB: 1975  Date of Creation:   Date Treatment Plan Last Reviewed/Revised: IP    DSM5 Diagnoses: Adjustment disorder with anxious mood  Trauma and stressor-related  disorder  Psychosocial / Contextual Factors: Medical Complexities and Occupational Issues  PROMIS (reviewed every 90 days):   PROMIS 10-Global Health (only subscores and total score):       5/1/2025    12:23 PM   PROMIS-10 Scores Only   Global Mental Health Score 11    Global Physical Health Score 14    PROMIS TOTAL - SUBSCORES 25        Proxy-reported        Referral / Collaboration:  Referral to another professional/service is not indicated at this time.    Anticipated number of session for this episode of care:  3-6 sessions  Anticipation frequency of session: Monthly  Anticipated Duration of each session: 16-37 minutes  Treatment plan will be reviewed in 90 days or when goals have been changed.       Patient has reviewed and agreed to the above plan.     Written by  Arina Whittaker, LIC, [facility]

## 2025-06-02 NOTE — PROGRESS NOTES
"Virtual Visit Details    Type of service:  Video Visit     Originating Location (pt. Location): Home    Distant Location (provider location):  Off-site  Platform used for Video Visit: [employer]       PSYCHIATRIC MEDICATION FOLLOW UP APPT     Name: Shauna Galdamez   : 1975               Telemedicine Visit: The patient's condition can be safely assessed and treated via synchronous audio and visual telemedicine encounter.      Consent:  The patient/guardian has verbally consented to: the potential risks and benefits of telemedicine (video visit or phone) versus in person care; bill my insurance or make self-payment for services provided; and responsibility for payment of non-covered services.     As the provider I attest to compliance with applicable laws and regulations related to telemedicine.         Source of Referral / Care Team:  Primary Care Provider: Brigida Aldrich MD   Referred by: Rose Mary Ramon MD (?OBGYN)  Current Psychotherapist: ?community provider     The [facility] psychiatry providers act as a specialty service for Primary Care Providers in the [facility] who seek to optimize medications for unstable patients. Once medications have been optimized, [facility] providers discharge the patient back to the referring Primary Care Provider for ongoing medication management. This type of system allows [facility] to serve a high volume of patients.     Patient Identification:  Patient is a 49 year old,   White  or  female  who presents for return visit with me.   Patient prefers to be called: \"Shauna\".  Patient is currently employed full time.     Patient attended the session alone.    RECORDS AVAILABLE FOR REVIEW: EHR records through [facility]  and I have reviewed the assessment completed by BARB Barclay, dated today .       Interim History:  Per [facility], BARB Barclay, during today's team-based visit:  \"Better/worse: \"am feeling better reducing Prozac\", " "there are still stressful days that were destined to be stressful and pt was able to manage them, no changes in being able to remember or recall things, has classes that are online now and the anxiety initially heightened during being in the school, less inattention, distractibility is slightly improving, jaw tension is still present and has slightly improved  ADLs: no throwing up or tension, still exhausted and taking naps though slightly better, avg 8-10 hours still interrupted, appetite- no concerns, hygiene- no concerns   Mood: \"better\"\"    I last saw Shauna Galdamez for outpatient psychiatry Consultation on 5/1/25. During that appointment, we planned to decrease to fluoxetine 60 mg and start propranolol 10 mg PRN. Patient reports ADHERING to prescribed medications. She reports reduction in fluoxetine does seem to have helped anxiety a bit (\"Going down has been a good thing\"), definitely not worsened. She has used the propranolol with good benefit. Does think some dizziness but overall well tolerated and without the drowsiness / grogginess of other PRNs. She has follow-up with cardiology this week and will plan to discuss with them, as may be discontinuing her other anti-HTN . Sleep has been improved some , although can still have middle of the night wakeups. She does continue to have high amount of stressors in life \"But able to handle It a little better.\" Denies significant depression or anhedonia, SI/SIB/HI, psychosis, azalea.       Initial Impression:   5/1/25: Shauna Galdamez is a 49 year old White, female who presents for initial visit with [facility] ([facility]) for medication management. Carries past diagnoses: anxiety, depression. Denies history of past psych providers, hopsitalizations, or suicide attempts. Patient referred by current OBGYN given ongoing significant anxiety that has worsened in context of perimenopause and psychosocial stressors. No noted benefit " from fluoxetine 80 mg (on for 30 years with good benefit to depression previously). Using PRN hydroxyzine 25 mg with some relief but bothersome side effects. Has not trialed propranolol yet (10 mg PRN). Denies significant depression or anhedonia. No SI/SIB/HI, psychosis, azalea, problematic substance use / past CD treatment. Discussed risks / benefits of medication changes today. Will initially trial reducing fluoxetine to r/o sx worsening due to medication and trial propranolol PRN (monitor BP / side effects closely). Could consider alternative SSRI/SNRI (eg. Venlafaxine, citalopram) or gabapentin in future if needed. Continue in therapy. Follow-up with this provider and Behavioral Health Consultant in 4 weeks or sooner as needed. Patient agreeable to plan.     Current medications include:   Current Outpatient Medications   Medication Sig Dispense Refill    estradiol (VIVELLE-DOT) 0.05 MG/24HR bi-weekly patch Place 1 patch onto the skin twice a week. 24 patch 4    fluocinonide (LIDEX) 0.05 % external solution 1 APPLICATION TWICE A DAY TOPICALLY X 7 DAYS THEN TWICE A WEEK AS NEEDED      FLUoxetine (PROZAC) 20 MG capsule Take 1 capsule (20 mg) by mouth daily. With fluoxetine 40 mg for total daily dose of 60 mg. 30 capsule 1    FLUoxetine (PROZAC) 40 MG capsule Take 2 capsules (80 mg) by mouth daily. 90 capsule 1    hydrOXYzine HCl (ATARAX) 25 MG tablet Take 1-2 tablets (25-50 mg) by mouth 3 times daily as needed for anxiety. 90 tablet 1    losartan-hydrochlorothiazide (HYZAAR) 50-12.5 MG tablet Take 1 tablet by mouth every morning      progesterone (PROMETRIUM) 100 MG capsule Take 1 capsule (100 mg) by mouth daily. 90 capsule 4    progesterone (PROMETRIUM) 200 MG capsule Take 1 capsule (200 mg) by mouth daily. for 14 days of the month (in a row). 90 capsule 1    propranolol (INDERAL) 10 MG tablet Take 1 tablet (10 mg) by mouth 3 times daily as needed (anxiety). 90 tablet 2     No current facility-administered  "medications for this visit.         Side effects: Yes: possible dizziness with propranolol     The [place] Prescription Monitoring Program : not reviewed today    Psychiatric ROS:  Shauna Galdamez reports mood has been: \"You know, a little better.\"  Depression has been: depressed mood, little interest / pleasure, sleep changes (insomnia or hypersomnia), fatigue of loss of energy, and difficulty concentrating or indecisiveness self rates as 2-3/10, where 0 is none at all and 10 being severe depression. Was \"mostly the anxiety\"/10 at last appt.  SI/SIB/HI: denies   Anxiety has been: excessive worry, difficult to control, restlessness / feeling keyed up,  easily fatigued,  difficulty concentrating or mind going blank, and sleep disturbances (difficulty falling / staying asleep, or restless / unsatisfying)  self rates as 3-4/ 10, where 0 is none at all and 10 being severe anxiety. Was 4-8/10 at last appt.  Sleep has been: Better, Less disturbed. Still up in the middle of the night but not as severe.  Energy has been: low, maybe mildly better  Appetite has been: denies change, had 1 weekend with higher   Génesis sxs: none  Psychosis sxs: none   ADHD/ADD sxs: Inattentive, Difficulties listening, Poor organizational skills, Distractibility, Forgetful, and Interrupts   Eating DO: no symptoms  Trauma sx: Experienced traumatic event ACES no, 3 adopted kids who have trauma,  was out of a job     PHQ2 (2) and GAD2 (2) scores were reviewed today.   PHQ-9 scores:       9/28/2023    10:33 AM 10/3/2024    10:02 AM 5/1/2025    12:20 PM   PHQ-9 SCORE   PHQ-9 Total Score MyChart   8 (Mild depression)   PHQ-9 Total Score 7 1 8        Proxy-reported       SUKHI-7 scores:        9/28/2023    10:33 AM 10/3/2024     9:34 AM 5/1/2025    12:26 PM   SUKHI-7 SCORE   Total Score  4 (minimal anxiety) 8 (mild anxiety)   Total Score 8 4 8        Proxy-reported         Current stressors include: Parenting Stress, Symptoms, and " "Occupational Difficulties  Coping mechanisms and supports include: Therapy    Vital Signs:   There were no vitals taken for this visit.    Review of Systems:  10 systems (general, cardiovascular, respiratory, eyes, ENT, endocrine, GI, , M/S, neurological) were reviewed. Most pertinent finding(s) is/are:  no tics / tremors / abnormal muscle mvmts on camera + \"shakiness\" (improved with propranolol) . No acute distress; no wheezing / short of breath / increased work of breathing; denies chest pain / tightness / palpitations; reduced appetite and recent weight loss; no nausea / vomiting / abdominal pain; ; no visible skin changes / rashes . The remaining systems are all unremarkable.    Labs:  Most recent laboratory results reviewed and no new labs.     Past Medical/Surgical History:  Past Medical History:   Diagnosis Date    Anxiety     Cherry angioma 2/6/2017    Closed fracture of tibia 1/13/2016    Depression     Mixed conductive and sensorineural hearing 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 7/9/2021    Added automatically from request for surgery [id number]    Perforation of tympanic membrane, bilateral 7/9/2021    Added automatically from request for surgery [id number]    Solar lentiginosis 2/6/2017      has a past medical history of Anxiety, Cherry angioma (2/6/2017), Closed fracture of tibia (1/13/2016), Depression, Mixed conductive and sensorineural hearing 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 (7/9/2021), Perforation of tympanic membrane, bilateral (7/9/2021), and Solar lentiginosis (2/6/2017).    Medication allergies:    Allergies   Allergen Reactions    Sulfa Antibiotics Unknown    Bupropion Rash     Annotation: rash and joint pain         Mental Status Exam:  Alertness: alert  and oriented   Appearance: well groomed  Behavior/Demeanor: cooperative and pleasant, with good eye contact   Speech: normal and regular rate and rhythm  Language: intact and no problems  Psychomotor: " "restless  Mood: \"You know, a little better.\"  Affect: full range and appropriate, bright; was congruent to mood; was congruent to content  Thought Process/Associations: unremarkable  Thought Content:  Reports none;  Denies suicidal ideation, violent ideation, and delusions  Perception:  Reports none;  Denies auditory hallucinations and visual hallucinations  Insight: intact  Judgment: intact  Cognition: does  appear grossly intact; formal cognitive testing was not done  Recent and Remote Memory: Intact to interview. Not formally assessed. No amnesia.  Attention Span and Concentration:  Intact to interview.   Fund of Knowledge:  appropriate  Gait and Station: unremarkable    Suicide Risk Assessment:  Today Shauna Galdamez reports no suicidal ideation. In addition, there are notable risk factors for self-harm, including anxiety and mood change. However, risk is mitigated by commitment to family, absence of past attempts, history of seeking help when needed, future oriented, no access to firearms or weapons, denies suicidal intent or plan, and denies homicidal ideation, intent, or plan. Therefore, based on all available evidence including the factors cited above, Shauna Galdamez does not appear to be at imminent risk for self-harm, does not meet criteria for a 72-hr hold, and therefore remains appropriate for ongoing outpatient level of care.  A thorough assessment of risk factors related to suicide and self-harm have been reviewed and are noted above. The patient convincingly denies suicidality on several occasions. Local community safety resources printed and reviewed for patient to use if needed. There was no deceit detected, and the patient presented in a manner that was believable.     Recommended that patient call 911 or go to the local ED should there be a change in any of these risk factors    DSM5 Diagnosis:  300.02 (F41.1) Generalized Anxiety Disorder     F33.41 MDD in partial " remission    Medical comorbidities include:   Patient Active Problem List    Diagnosis Date Noted    Mucoid cyst of joint 02/15/2022     Priority: Medium     Added automatically from request for surgery [id number]      Perforation of tympanic membrane, bilateral 07/09/2021     Priority: Medium     Added automatically from request for surgery [id number]      Mixed conductive and sensorineural hearing 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 07/09/2021     Priority: Medium     Added automatically from request for surgery [id number]      Episode of recurrent major depressive disorder, unspecified depression episode severity 04/02/2021     Priority: Medium    Family history of melanoma 02/06/2017     Priority: Medium    Pilar cyst 02/06/2017     Priority: Medium    Anxiety 04/11/2011     Priority: Medium    PMS (premenstrual syndrome) 11/17/2010     Priority: Medium       Psychosocial & Contextual Factors:  Parent/Child Relationship Difficulties, Phase of Life Difficulties, and Medical Comorbidites    DIFFERENTIAL DIAGNOSIS: R/O anxiety disorder versus adjustment disorder versus anxiety due to other medical condition     Medical comorbidities impacting or contributing to clinical picture: history of vestibular (acoustic) schwannoma and hearing loss, perimenopause.   Known issue that I take into account for their medical decisions, no current exacerbations or new concerns.    Impression:  Shauna Galdamez is a 49 year old White, female who presents for return visit with  [facility] ([facility]) for medication management. At initial appointment one month ago, we planned to decrease to fluoxetine 60 mg and start propranolol 10 mg PRN. Patient reports reduction in fluoxetine does seem to have helped anxiety a bit. She has used the propranolol with good benefit. Does think some dizziness but overall well tolerated and without the drowsiness / grogginess of other PRNs. She has follow-up with  cardiology this week and will plan to discuss with them, as may be discontinuing her other anti-HTN . Sleep has been improved some , although can still have middle of the night wakeups. Denies significant depression or anhedonia, SI/SIB/HI, psychosis, azalea. We will plan to continue current medication, follow-up with Behavioral Health Consultant in 6 weeks or sooner as needed. Patient agreeable to plan.     Medication side effects and alternatives were reviewed. Health promotion activities recommended and reviewed today. All questions addressed. Education and counseling completed regarding risks and benefits of medications and psychotherapy options. Recommend therapy for additional support.       Treatment Plan:  CONTINUE fluoxetine 60 mg (40 + 20 mg) every day. Script sent.  CONTINUE propranolol 10 mg as needed per last provider.   Monitor BP at home as possible.  CONTINUE hydroxyzine 25-50 mg as needed per last provider.   Continue  all other medications per primary care provider.   Continue therapy with established provider.  Follow-up with cardiology as planned.   Safety plan reviewed. To the Emergency Department as needed or call after hours crisis line at [phone] or [phone]. [place] Crisis Text Line. Text [place] to 446047 or Suicide LifeLine Chat: suicidepreventionlifeline.org/chat  Schedule an appointment with me and Behavioral Health Consultant in 6 weeks or sooner as needed. Call [facility] at [phone] to schedule.  Follow up with primary care provider as planned or for acute medical concerns.  Call the psychiatric nurse line with medication questions or concerns at [phone].  MyChart may be used to communicate with your provider, but this is not intended to be used for emergencies.    Patient Education:  Medication side effects and alternatives reviewed. Health promotion activities recommended and reviewed today. All questions addressed. Education and counseling completed  regarding risks and benefits of medications and psychotherapy options.  Consent provided by patient/guardian  Call the psychiatric nurse line with medication questions or concerns at [phone].  [employer] may be used to communicate with your provider, but this is not intended to be used for emergencies.  Medlineplus.gov is information for patients.  It is run by the [employer] and it contains information about all disorders, diseases and all medications.      Side effects for SSRI: sexual dysfunction, decreased appetite, increased appetite, nausea, diarrhea, constipation, dry mouth, insomnia, sedation, agitation, tremors, headaches, dizziness, sweating, bruising and rare bleeding, discontinuation syndrome, rare hyponatremia, rare induction of azalea, suicidal ideations, and serotonin syndrome.     Discussed use of propranolol as off label for anxiety, and side effects to monitor including low HR, BP, lightheadedness or dizziness.    Community Resources:    National Suicide Prevention Lifeline: 565.487.8961 (TTY: 536.492.9552). Call anytime for help.  (www.suicidepreventionlifeline.org)  National [place] on Mental Illness (www.lola.org): 898.287.5224 or 985-230-6515.   Mental Health Association (www.mentalhealth.org): 720.121.9610 or 324-965-7018.  [place] Crisis Text Line: Text [place] to 188393  Suicide LifeLine Chat: suicideFlowlineline.org/chat    Administrative Billing:     Level of Medical Decision Making:   - At least 1 chronic problem that is not stable  - Engaged in prescription drug management during visit (discussed any medication benefits, side effects, alternatives, etc.)             Patient Status:  [facility] MD/DO/NP/PA providers offer care a specialty service for Primary Care Providers in the [facility] that seek to optimize psychotropic medications for unstable patients.  Once medications have been optimized, our providers discharge the patient back to the referring Primary Care  Provider for ongoing medication management.  This type of system allows our providers to serve a high volume of patients.   Patient will continue to be seen for ongoing consultation and stabilization.    Signed:   Corrine Cox, MSN, APRN, PMHNP-BC  [facility] Service ([facility])  [facility]    Chart documentation done in part with Dragon Voice Recognition software.  Although reviewed after completion, some word and grammatical errors may remain.

## 2025-06-02 NOTE — PATIENT INSTRUCTIONS
Ice facial roller  Craniosacral message  Spending 45 sec in nature can boost mood and focus   Mindfulness notice things with the 5 senses  Mindfulness  henny- developed by the [facility] will teach mindfulness skills   Mindful eating oranges or apply this to other foods:   https://mindfulnessavolution.[employer]o/mindful-eating-oranges/    Mindful walking:  https://www.mindful.org/daily-mindful-walking-practice/     Mindful Showering:  https://www.[employer]/livewell/mindfulness-in-the-shower/    Mindfully listening to music:  https://Crisp.com/mindfulness-with-music/    Imagine that you are listening to this music for the very first time, notice the instruments, patterns, tempo, lyrics, notice things in detail, how it makes you feel, if your mind wanders, bring it back to your breathing or the sound.

## 2025-06-02 NOTE — NURSING NOTE
Is the patient currently in the state of [place]? YES    Current patient location: [address] [place] [address]    Visit mode:Video    If the visit is dropped, the patient can be reconnected by: VIDEO VISIT: Text to cell phone:   Telephone Information:   Mobile [phone]       Will anyone else be joining the visit? No  (If patient encounters technical issues they should call [phone])    Are changes needed to the allergy or medication list? No    Are refills needed on medications prescribed by this physician? Discuss with Provider May want more refills order for propanolol    Rooming Documentation: Questionnaire(s) completed.    Reason for visit: MARION Addison

## 2025-06-04 NOTE — PATIENT INSTRUCTIONS
**For crisis resources, please see the information at the end of this document**     Thank you for coming to the [facility].    TREATMENT PLAN:  CCPS MD/DO/NP/PA providers offer care a specialty service for Primary Care Providers in the [facility] that seek to optimize psychotropic medications for unstable patients.  Once medications have been optimized, our providers discharge the patient back to the referring Primary Care Provider for ongoing medication management.  This type of system allows our providers to serve a high volume of patients.     Medications:   CONTINUE fluoxetine 60 mg (40 + 20 mg) every day. Script sent.  CONTINUE propranolol 10 mg as needed per last provider.   Monitor BP at home as possible.  CONTINUE hydroxyzine 25-50 mg as needed per last provider.   Continue  all other medications per primary care provider.     Consults / Referrals:   Continue therapy with established provider.  Follow-up with cardiology as planned.     Follow-up:  Schedule an appointment with me and Behavioral Health Consultant in 6 weeks or sooner as needed.  Call [facility] at [phone] to schedule.  Follow up with primary care provider as planned or sooner if needed for acute medical concerns.  Call the psychiatric nurse line with medication questions or concerns at [phone].  MyChart may be used to communicate with your provider, but this is not intended to be used for emergencies.    Psychoeducation:  Side effects for SSRI: sexual dysfunction, decreased appetite, increased appetite, nausea, diarrhea, constipation, dry mouth, insomnia, sedation, agitation, tremors, headaches, dizziness, sweating, bruising and rare bleeding, discontinuation syndrome, rare hyponatremia, rare induction of azalea, suicidal ideations, and serotonin syndrome.    Discussed use of propranolol as off label for anxiety, and side effects to monitor including low HR, BP,  lightheadedness or dizziness.      Financial Assistance [phone]  [facility] Billing [phone]  Central Billing Office, [facility]: [phone]  [place] [facility] Billing [phone]  Medical Records [phone]  [place] [facility] Patient Bill of Rights [email or web address]       MENTAL HEALTH CRISIS RESOURCES:  For a emergency help, please call 911 or go to the nearest Emergency Department.     Emergency Walk-In Options:   EmPATH Unit @ [facility] ([place]): [phone] - Specialized mental health emergency area designed to be calming  [facility] ([place]): [phone]  [facility] ([place]): [phone]  [facility]): [phone]    [facility] Crisis Information:   [place]: [phone]  [place]: [phone]  [place] ([place]) - Adult: [phone]     Child: [phone]  [place] - Adult: [phone]     Child: [phone]  [place]: [phone]  List of all [facility] resources:   https://[place].gov/dhs/people-we-serve/adults/health-care/mental-health/resources/crisis-contacts.jsp    National Crisis Information:   National Suicide & Crisis Lifeline: Call 988        For online chat options, visit https://suicidepreventionlifeline.org/chat/  Poison Control Center: 0-394-141-0090  Poison Control Center: 3-891-549-2078  Trans Lifeline: 1-641.240.1437 - Hotline for transgender people of all ages  The [employer]: 1-283.163.4238 - Hotline for LGBT youth     For Non-Emergency Support:   Fast Tracker: Mental Health & Substance Use Disorder Resources -   https://www.fasttrackermn.org/       Again thank you for choosing [facility] and please let us know how we can best partner with you to improve you and your family's health.    You may be receiving a survey regarding this appointment. We would love to have your feedback, both positive and negative. The  "survey is done by an external company, so your answers are anonymous.        Patient Education   Collaborative Care Psychiatry Service  What to Expect  Here's what to expect from your Collaborative Care Psychiatry Service (CCPS).   About CCPS  CCPS means 2 people work together to help you get better. You'll meet with a behavioral health clinician and a psychiatric doctor. A behavioral health clinician helps people with mental health problems by talking with them. A psychiatric doctor helps people by giving them medicine.  How it works  At every visit, you'll see the behavioral health clinician (BHC) first. They'll talk with you about how you're doing and teach you how to feel better.   Then you'll see the psychiatric doctor. This doctor can help you deal with troubling thoughts and feelings by giving you medicine. They'll make sure you know the plan for your care.   CCPS usually takes 3 to 6 visits. If you need more visits, we may have you start seeing a different psychiatric doctor for ongoing care.  If you have any questions or concerns, we'll be glad to talk with you.  About visits  Be open  At your visits, please talk openly about your problems. It may feel hard, but it's the best way for us to help you.  Cancelling visits  If you can't come to your visit, please call us right away at [phone]. If you don't cancel at least 24 hours (1 full day) before your visit, that's \"late cancellation.\"  Being late to visits  Being very late is the same as not showing up. You will be a \"no show\" if:  Your appointment starts with a BHC, and you're more than 15 minutes late for a 30-minute (half hour) visit. This will also cancel your appointment with the psychiatric doctor.  Your appointment is with a psychiatric doctor only, and you're more than 15 minutes late for a 30-minute (half hour) visit.  Your appointment is with a psychiatric doctor only, and you're more than 30 minutes late for a 60-minute (full hour) " visit.  If you cancel late or don't show up 2 times within 6 months, we may end your care.   Getting help between visits  If you need help between visits, you can call us Monday to Friday from 8 a.m. to 4:30 p.m. at [phone].  Emergency care  Call 911 or go to the nearest emergency department if your life or someone else's life is in danger.  Call 988 anytime to reach the national Suicide and Crisis hotline.  Medicine refills  To refill your medicine, call your pharmacy. You can also call [facility]'s Behavioral Access at [phone], Monday to Friday, 8 a.m. to 4:30 p.m. It can take 1 to 3 business days to get a refill.   Forms, letters, and tests  You may have papers to fill out, like FMLA, short-term disability, and workability. We can help you with these forms at your visits, but you must have an appointment. You may need more than 1 visit for this, to be in an intensive therapy program, or both.  Before we can give you medicine for ADHD, we may refer you to get tested for it or confirm it another way.  We may not be able to give you an emotional support animal letter.  We don't do mental health checks ordered by the court.   We don't do mental health testing, but we can refer you to get tested.   Thank you for choosing us for your care.  For informational purposes only. Not to replace the advice of your health care provider. Copyright   2022 [facility]. All rights reserved. [employer] 884883 - Rev 11/24.

## 2025-06-26 ENCOUNTER — VIRTUAL VISIT (OUTPATIENT)
Dept: OBGYN | Facility: CLINIC | Age: 50
End: 2025-06-26
Attending: NURSE PRACTITIONER
Payer: COMMERCIAL

## 2025-06-26 DIAGNOSIS — N95.1 PERIMENOPAUSAL VASOMOTOR SYMPTOMS: ICD-10-CM

## 2025-06-26 DIAGNOSIS — N95.8 GENITOURINARY SYNDROME OF MENOPAUSE: Primary | ICD-10-CM

## 2025-06-26 DIAGNOSIS — F41.9 ANXIETY: ICD-10-CM

## 2025-06-26 RX ORDER — ESTRADIOL 0.1 MG/G
CREAM VAGINAL
Qty: 42.5 G | Refills: 2 | Status: SHIPPED | OUTPATIENT
Start: 2025-06-26 | End: 2026-01-06

## 2025-06-26 RX ORDER — ESTRADIOL 0.07 MG/D
1 FILM, EXTENDED RELEASE TRANSDERMAL
Qty: 24 PATCH | Refills: 2 | Status: SHIPPED | OUTPATIENT
Start: 2025-06-26

## 2025-06-26 RX ORDER — PROGESTERONE 100 MG/1
100 CAPSULE ORAL DAILY
Qty: 90 CAPSULE | Refills: 4 | Status: SHIPPED | OUTPATIENT
Start: 2025-06-26

## 2025-06-26 NOTE — LETTER
"2025       RE: Shauna Galdamez  [address]  [place] [address]     Dear Colleague,    Thank you for referring your patient, Shauna Galdamez, to the [facility] [place] [facility]. Please see a copy of my visit note below.    Subjective    Shauna Galdamez is a 49 year old female who is being evaluated via a billable telephone visit.      The patient has been notified of following:     \"This video visit will be conducted via a call between you and your physician/provider. We have found that certain health care needs can be provided without the need for a physical exam.  This service lets us provide the care you need with a short phone conversation.  If a prescription is necessary we can send it directly to your pharmacy.  If lab work is needed we can place an order for that and you can then stop by our lab to have the test done at a later time.  If during the course of the call the physician/provider feels a telephone visit is not appropriate, you will not be charged for this service.\"     Physician has received verbal consent for a Video Visit from the patient? Yes    BP Readings from Last 3 Encounters:   25 89/62   24 107/73   10/03/24 107/77    Wt Readings from Last 3 Encounters:   25 59.8 kg (131 lb 12.8 oz)   24 58.1 kg (128 lb)   10/03/24 59.3 kg (130 lb 11.2 oz)          History reviewed and updated as needed this visit by Provider    Subjective:  Shauna Galdamez is an 49 year old,  with three adopted children, presenting for follow-up of moe/menopause symptoms and treatment.     10/2024:  ASSESSMENT/ PLAN:  1. Perimenopausal vasomotor symptoms  Pt desires to continue current HT regimen. Continue to track menstrual bleeding and given precautions on when to return to care.   - progesterone (PROMETRIUM) 200 MG capsule; Take 1 capsule (200 mg) by mouth daily. for " 14 days of the month (in a row).  Dispense: 90 capsule; Refill: 1  - estradiol (VIVELLE-DOT) 0.05 MG/24HR bi-weekly patch; Place 1 patch onto the skin twice a week.  Dispense: 24 patch; Refill: 2    Current moe/menopause symptoms include:  - Anxiety. Not improved by HRT. Pt is working through anxiety sympotoms with mental health provider and therapist.   - Joint pain. Noticed as one of first perimenopausal symptoms. Resolved when starting HRT, but now has returned. Most bothersome in wrists.    - Occasional night sweats and hot flashes.   - Urinary frequency and some vaginal dryness.   - Interrupted sleep. Does endorse better sleep on days with progestin.     Personal risks associated with hormone therapy:   None    Gynecologic History  LMP/ 24 3-4 days  January had 1 day of bleeding. Patient is perimenopausal, close to menopause.   Current contraception: partner infertility   Last pap smear: 2021 NIL, HPV negative   Last mammogram: 2024 IMPRESSION: ACR BI-RADS Category 1: Negative   Last colonoscopy: Cologuard completed 23     Obstetric History  OB History    Para Term  AB Living   0 0 0 0 0 0   SAB IAB Ectopic Multiple Live Births   0 0 0 0 0   Obstetric Comments   Has 3 adopted children      Labs:  TSH   Date Value Ref Range Status   2025 2.34 0.30 - 4.20 uIU/mL Final   2022 2.75 0.30 - 5.00 uIU/mL Final   08/10/2021 1.89 0.40 - 4.00 mU/L Final   2021 2.34 0.40 - 4.00 mU/L Final     Lab Results   Component Value Date    CHOL 195 2023    CHOL 168 2018     Lab Results   Component Value Date    HDL 80 2023    HDL 68 2018     Lab Results   Component Value Date    LDL 89 2023    LDL 86 2018     Lab Results   Component Value Date    TRIG 128 2023    TRIG 68 2018       Past Medical History  Hypertension on medication  BP Readings from Last 6 Encounters:   25 89/62   24 107/73   10/03/24 107/77   24  112/79   05/30/24 93/64   05/07/24 98/62     Past Medical History:   Diagnosis Date     Anxiety      Cherry angioma 2/6/2017     Closed fracture of tibia 1/13/2016     Depression      Mixed conductive and sensorineural hearing 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 7/9/2021    Added automatically from request for surgery [id number]     Perforation of tympanic membrane, bilateral 7/9/2021    Added automatically from request for surgery [id number]     Solar lentiginosis 2/6/2017       Past Surgical History  Past Surgical History:   Procedure Laterality Date     ANKLE SURGERY Bilateral      IMPLANT BAHA Left 5/7/2024    Procedure: INSERTION, BONE ANCHORED HEARING AID WITH THE OTICON DEVICE LEFT;  Surgeon: Brigida Mello MD;  Location: [facility] [place]     ORTHOPEDIC SURGERY       PE TUBES       TYMPANOPLASTY Left 8/11/2021    Procedure: TYMPANOPLASTY WITH CARTILAGE BACKING LEFT, T TUBE INSERTION;  Surgeon: Brigida Mello MD;  Location: [facility] OR       Medications  Current Outpatient Medications   Medication Sig Dispense Refill     estradiol (ESTRACE) 0.1 MG/GM vaginal cream Place 2 g vaginally daily for 14 days, THEN 2 g twice a week. 42.5 g 2     estradiol (VIVELLE-DOT) 0.075 MG/24HR BIW patch Place 1 patch onto the skin twice a week 24 patch 2     progesterone (PROMETRIUM) 100 MG capsule Take 1 capsule (100 mg) by mouth daily. 90 capsule 4     fluocinonide (LIDEX) 0.05 % external solution 1 APPLICATION TWICE A DAY TOPICALLY X 7 DAYS THEN TWICE A WEEK AS NEEDED       FLUoxetine (PROZAC) 20 MG capsule Take 1 capsule (20 mg) by mouth daily. With fluoxetine 40 mg for total daily dose of 60 mg. 90 capsule 0     FLUoxetine (PROZAC) 40 MG capsule Take 2 capsules (80 mg) by mouth daily. 90 capsule 1     hydrOXYzine HCl (ATARAX) 25 MG tablet Take 1-2 tablets (25-50 mg) by mouth 3 times daily as needed for anxiety. 90 tablet 1     losartan-hydrochlorothiazide (HYZAAR) 50-12.5 MG tablet Take 1 tablet by mouth every morning        propranolol (INDERAL) 10 MG tablet Take 1 tablet (10 mg) by mouth 3 times daily as needed (anxiety). 90 tablet 2     No current facility-administered medications for this visit.       Allergies     Allergies   Allergen Reactions     Sulfa Antibiotics Unknown     Bupropion Rash     Annotation: rash and joint pain         Family History  Family History   Problem Relation Age of Onset     Skin Cancer Mother         BCC     Mental Illness Mother      Thyroid Disease Mother      Stomach Problem Mother      Hypertension Mother      Substance Abuse Father      No Known Problems Sister      No Known Problems Brother      Heart Disease Maternal Grandmother      No Known Problems Maternal Grandfather      No Known Problems Paternal Grandmother      No Known Problems Other      Melanoma No family hx of      Anesthesia Reaction No family hx of      Deep Vein Thrombosis (DVT) No family hx of      No family history of breast, uterine, ovarian or colon cancer.    Objective  Video visit   EXAM:  General - pleasant female in no acute distress, evaluated via video    ASSESSMENT/PLAN:  Shauna Galdamez is an 49 year old, , who requests an evaluation of moe/menopause symptoms.    ICD-10-CM    1. Genitourinary syndrome of menopause  N95.8 estradiol (ESTRACE) 0.1 MG/GM vaginal cream      2. Perimenopausal vasomotor symptoms  N95.1 estradiol (VIVELLE-DOT) 0.075 MG/24HR BIW patch     progesterone (PROMETRIUM) 100 MG capsule      3. Anxiety  F41.9 progesterone (PROMETRIUM) 100 MG capsule        - Increased Vivelle Dot dose from 0.05 to 0.75.   - Transition to 100mg nightly progesterone and monitor for improvement in sleep   - Offered rx for vaginal estrace cream. Pt will trial increased Vivelle dose and then start vaginal cream if symptoms continuied. Discussed r/b and that this can be start at any time.       Date: 25  Start time: 0900  Stop time: 915  Patient location: Home  Method of technology: Video    Return to  clinic in 6 months.  Follow-up sooner as needed.    GT Lopez CNM        Again, thank you for allowing me to participate in the care of your patient.      Sincerely,    GT Lopez CNM

## 2025-06-26 NOTE — PROGRESS NOTES
"Subjective     Shauna Galdamez is a 49 year old female who is being evaluated via a billable telephone visit.      The patient has been notified of following:     \"This video visit will be conducted via a call between you and your physician/provider. We have found that certain health care needs can be provided without the need for a physical exam.  This service lets us provide the care you need with a short phone conversation.  If a prescription is necessary we can send it directly to your pharmacy.  If lab work is needed we can place an order for that and you can then stop by our lab to have the test done at a later time.  If during the course of the call the physician/provider feels a telephone visit is not appropriate, you will not be charged for this service.\"     Physician has received verbal consent for a Video Visit from the patient? Yes    BP Readings from Last 3 Encounters:   25 89/62   24 107/73   10/03/24 107/77    Wt Readings from Last 3 Encounters:   25 59.8 kg (131 lb 12.8 oz)   24 58.1 kg (128 lb)   10/03/24 59.3 kg (130 lb 11.2 oz)          History reviewed and updated as needed this visit by Provider    Subjective:  Shauna Galdamez is an 49 year old,  with three adopted children, presenting for follow-up of moe/menopause symptoms and treatment.     10/2024:  ASSESSMENT/ PLAN:  1. Perimenopausal vasomotor symptoms  Pt desires to continue current HT regimen. Continue to track menstrual bleeding and given precautions on when to return to care.   - progesterone (PROMETRIUM) 200 MG capsule; Take 1 capsule (200 mg) by mouth daily. for 14 days of the month (in a row).  Dispense: 90 capsule; Refill: 1  - estradiol (VIVELLE-DOT) 0.05 MG/24HR bi-weekly patch; Place 1 patch onto the skin twice a week.  Dispense: 24 patch; Refill: 2    Current moe/menopause symptoms include:  - Anxiety. Not improved by HRT. Pt is working through anxiety sympotoms with mental " health provider and therapist.   - Joint pain. Noticed as one of first perimenopausal symptoms. Resolved when starting HRT, but now has returned. Most bothersome in wrists.    - Occasional night sweats and hot flashes.   - Urinary frequency and some vaginal dryness.   - Interrupted sleep. Does endorse better sleep on days with progestin.     Personal risks associated with hormone therapy:   None    Gynecologic History  LMP/ 24 3-4 days  January had 1 day of bleeding. Patient is perimenopausal, close to menopause.   Current contraception: partner infertility   Last pap smear: 2021 NIL, HPV negative   Last mammogram: 2024 IMPRESSION: ACR BI-RADS Category 1: Negative   Last colonoscopy: Cologuard completed 23     Obstetric History  OB History    Para Term  AB Living   0 0 0 0 0 0   SAB IAB Ectopic Multiple Live Births   0 0 0 0 0   Obstetric Comments   Has 3 adopted children      Labs:  TSH   Date Value Ref Range Status   2025 2.34 0.30 - 4.20 uIU/mL Final   2022 2.75 0.30 - 5.00 uIU/mL Final   08/10/2021 1.89 0.40 - 4.00 mU/L Final   2021 2.34 0.40 - 4.00 mU/L Final     Lab Results   Component Value Date    CHOL 195 2023    CHOL 168 2018     Lab Results   Component Value Date    HDL 80 2023    HDL 68 2018     Lab Results   Component Value Date    LDL 89 2023    LDL 86 2018     Lab Results   Component Value Date    TRIG 128 2023    TRIG 68 2018       Past Medical History  Hypertension on medication  BP Readings from Last 6 Encounters:   25 89/62   24 107/73   10/03/24 107/77   24 112/79   24 93/64   24 98/62     Past Medical History:   Diagnosis Date    Anxiety     Cherry angioma 2017    Closed fracture of tibia 2016    Depression     Mixed conductive and sensorineural hearing 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 2021    Added automatically from request for  surgery [id number]    Perforation of tympanic membrane, bilateral 7/9/2021    Added automatically from request for surgery [id number]    Solar lentiginosis 2/6/2017       Past Surgical History  Past Surgical History:   Procedure Laterality Date    ANKLE SURGERY Bilateral     IMPLANT BAHA Left 5/7/2024    Procedure: INSERTION, BONE ANCHORED HEARING AID WITH THE OTICON DEVICE LEFT;  Surgeon: Brigida Mello MD;  [place] [facility] [place]    ORTHOPEDIC SURGERY      PE TUBES      TYMPANOPLASTY Left 8/11/2021    Procedure: TYMPANOPLASTY WITH CARTILAGE BACKING LEFT, T TUBE INSERTION;  Surgeon: Brigida Mello MD;  [place] [facility] [place]       Medications  Current Outpatient Medications   Medication Sig Dispense Refill    estradiol (ESTRACE) 0.1 MG/GM vaginal cream Place 2 g vaginally daily for 14 days, THEN 2 g twice a week. 42.5 g 2    estradiol (VIVELLE-DOT) 0.075 MG/24HR BIW patch Place 1 patch onto the skin twice a week 24 patch 2    progesterone (PROMETRIUM) 100 MG capsule Take 1 capsule (100 mg) by mouth daily. 90 capsule 4    fluocinonide (LIDEX) 0.05 % external solution 1 APPLICATION TWICE A DAY TOPICALLY X 7 DAYS THEN TWICE A WEEK AS NEEDED      FLUoxetine (PROZAC) 20 MG capsule Take 1 capsule (20 mg) by mouth daily. With fluoxetine 40 mg for total daily dose of 60 mg. 90 capsule 0    FLUoxetine (PROZAC) 40 MG capsule Take 2 capsules (80 mg) by mouth daily. 90 capsule 1    hydrOXYzine HCl (ATARAX) 25 MG tablet Take 1-2 tablets (25-50 mg) by mouth 3 times daily as needed for anxiety. 90 tablet 1    losartan-hydrochlorothiazide (HYZAAR) 50-12.5 MG tablet Take 1 tablet by mouth every morning      propranolol (INDERAL) 10 MG tablet Take 1 tablet (10 mg) by mouth 3 times daily as needed (anxiety). 90 tablet 2     No current facility-administered medications for this visit.       Allergies     Allergies   Allergen Reactions    Sulfa Antibiotics Unknown    Bupropion Rash     Annotation: rash and joint pain         Family History  Family  History   Problem Relation Age of Onset    Skin Cancer Mother         BCC    Mental Illness Mother     Thyroid Disease Mother     Stomach Problem Mother     Hypertension Mother     Substance Abuse Father     No Known Problems Sister     No Known Problems Brother     Heart Disease Maternal Grandmother     No Known Problems Maternal Grandfather     No Known Problems Paternal Grandmother     No Known Problems Other     Melanoma No family hx of     Anesthesia Reaction No family hx of     Deep Vein Thrombosis (DVT) No family hx of      No family history of breast, uterine, ovarian or colon cancer.    Objective  Video visit   EXAM:  General - pleasant female in no acute distress, evaluated via video    ASSESSMENT/PLAN:  Shauna Galdamez is an 49 year old, , who requests an evaluation of moe/menopause symptoms.    ICD-10-CM    1. Genitourinary syndrome of menopause  N95.8 estradiol (ESTRACE) 0.1 MG/GM vaginal cream      2. Perimenopausal vasomotor symptoms  N95.1 estradiol (VIVELLE-DOT) 0.075 MG/24HR BIW patch     progesterone (PROMETRIUM) 100 MG capsule      3. Anxiety  F41.9 progesterone (PROMETRIUM) 100 MG capsule        - Increased Vivelle Dot dose from 0.05 to 0.75.   - Transition to 100mg nightly progesterone and monitor for improvement in sleep   - Offered rx for vaginal estrace cream. Pt will trial increased Vivelle dose and then start vaginal cream if symptoms continuied. Discussed r/b and that this can be start at any time.       Date: 25  Start time: 0900  Stop time: 915  Patient location: Home  Method of technology: Video    Return to clinic in 6 months.  Follow-up sooner as needed.    GT Lopez CNM

## 2025-08-11 DIAGNOSIS — F41.9 ANXIETY: ICD-10-CM

## 2025-08-11 DIAGNOSIS — F33.9 EPISODE OF RECURRENT MAJOR DEPRESSIVE DISORDER, UNSPECIFIED DEPRESSION EPISODE SEVERITY: ICD-10-CM

## 2025-08-12 RX ORDER — FLUOXETINE HYDROCHLORIDE 40 MG/1
40 CAPSULE ORAL DAILY
Qty: 90 CAPSULE | Refills: 0 | Status: SHIPPED | OUTPATIENT
Start: 2025-08-12

## (undated) DEVICE — TUBE EAR GOODE T SIL 10-16010

## (undated) DEVICE — DRAPE U SPLIT 74X120" 29440

## (undated) DEVICE — NIM ELEC SUBDERMAL NDL 3PAIR/BOX

## (undated) DEVICE — WIPE INSTRUMENT MEROCEL 400200

## (undated) DEVICE — PREP POVIDONE IODINE SOLUTION 10% 4OZ BOTTLE 29906-004

## (undated) DEVICE — SUCTION MANIFOLD NEPTUNE 2 SYS 4 PORT 0702-020-000

## (undated) DEVICE — PREP SKIN SCRUB TRAY 4461A

## (undated) DEVICE — SU ETHILON 3-0 PS-1 18" 1663H

## (undated) DEVICE — STRAP KNEE/BODY 31143004

## (undated) DEVICE — LINEN TOWEL PACK X5 5464

## (undated) DEVICE — PREP POVIDONE-IODINE 7.5% SCRUB 4OZ BOTTLE MDS093945

## (undated) DEVICE — PUNCH SKIN 5MM P550

## (undated) DEVICE — SOL WATER IRRIG 500ML BOTTLE 2F7113

## (undated) DEVICE — Device

## (undated) DEVICE — CAP SOFT HEALING M52080

## (undated) DEVICE — TRAY PREP DRY SKIN SCRUB 067

## (undated) DEVICE — SOL NACL 0.9% IRRIG 500ML BOTTLE 2F7123

## (undated) DEVICE — PACK EAR CUSTOM ASC

## (undated) DEVICE — BLADE KNIFE BEAVER TYMPANOPLASTY 2.5MM W/60D DOWN 377200

## (undated) DEVICE — ESU ELEC BLADE 2.75" COATED/INSULATED E1455

## (undated) DEVICE — ESU GROUND PAD ADULT W/CORD E7507

## (undated) DEVICE — DRSG COTTON BALL 6PK LCB62

## (undated) DEVICE — SYR 10ML LL W/O NDL

## (undated) DEVICE — OTICON PONTO DRILL WIDE COUNTERSINK WITH STOP 4MM M51122

## (undated) DEVICE — DRAPE SPLIT SHEET 77X108 REINFORCED 29436

## (undated) DEVICE — DRAPE MICROSCOPE LEICA 54X150" AR8033650

## (undated) DEVICE — NDL ANGIOCATH 14GA 1.25" 4048

## (undated) DEVICE — GLOVE PROTEXIS POWDER FREE SMT 6.5  2D72PT65X

## (undated) DEVICE — SU VICRYL 3-0 PS-2 27" UND J427H

## (undated) DEVICE — DRSG BANDAID 1X3" FABRIC CURITY LATEX FREE KC44101

## (undated) DEVICE — SPONGE SURGIFOAM 100 1974

## (undated) DEVICE — BLADE CLIPPER SGL USE 9680

## (undated) DEVICE — GLOVE BIOGEL PI MICRO SZ 6.5 48565

## (undated) DEVICE — NDL 25GA 1.5" 305127

## (undated) DEVICE — BLADE KNIFE BEAVER MINI BEAVER6400

## (undated) RX ORDER — LIDOCAINE HYDROCHLORIDE 20 MG/ML
INJECTION, SOLUTION EPIDURAL; INFILTRATION; INTRACAUDAL; PERINEURAL
Status: DISPENSED
Start: 2021-08-11

## (undated) RX ORDER — DEXAMETHASONE SODIUM PHOSPHATE 10 MG/ML
INJECTION, SOLUTION INTRAMUSCULAR; INTRAVENOUS
Status: DISPENSED
Start: 2021-08-11

## (undated) RX ORDER — ACETAMINOPHEN 325 MG/1
TABLET ORAL
Status: DISPENSED
Start: 2021-08-11

## (undated) RX ORDER — BACITRACIN 500 [USP'U]/G
OINTMENT OPHTHALMIC
Status: DISPENSED
Start: 2021-08-11

## (undated) RX ORDER — PROPOFOL 10 MG/ML
INJECTION, EMULSION INTRAVENOUS
Status: DISPENSED
Start: 2021-08-11

## (undated) RX ORDER — REMIFENTANIL HYDROCHLORIDE 1 MG/ML
INJECTION, POWDER, LYOPHILIZED, FOR SOLUTION INTRAVENOUS
Status: DISPENSED
Start: 2021-08-11

## (undated) RX ORDER — ONDANSETRON 2 MG/ML
INJECTION INTRAMUSCULAR; INTRAVENOUS
Status: DISPENSED
Start: 2024-05-07

## (undated) RX ORDER — FENTANYL CITRATE 50 UG/ML
INJECTION, SOLUTION INTRAMUSCULAR; INTRAVENOUS
Status: DISPENSED
Start: 2021-08-11

## (undated) RX ORDER — GLYCOPYRROLATE 0.2 MG/ML
INJECTION INTRAMUSCULAR; INTRAVENOUS
Status: DISPENSED
Start: 2021-08-11

## (undated) RX ORDER — OXYCODONE HYDROCHLORIDE 5 MG/1
TABLET ORAL
Status: DISPENSED
Start: 2021-08-11

## (undated) RX ORDER — CEFAZOLIN SODIUM 1 G/3ML
INJECTION, POWDER, FOR SOLUTION INTRAMUSCULAR; INTRAVENOUS
Status: DISPENSED
Start: 2021-08-11

## (undated) RX ORDER — OXYCODONE HYDROCHLORIDE 5 MG/1
TABLET ORAL
Status: DISPENSED
Start: 2024-05-07

## (undated) RX ORDER — ONDANSETRON 2 MG/ML
INJECTION INTRAMUSCULAR; INTRAVENOUS
Status: DISPENSED
Start: 2021-08-11

## (undated) RX ORDER — PROPOFOL 10 MG/ML
INJECTION, EMULSION INTRAVENOUS
Status: DISPENSED
Start: 2024-05-07

## (undated) RX ORDER — EPINEPHRINE NASAL SOLUTION 1 MG/ML
SOLUTION NASAL
Status: DISPENSED
Start: 2021-08-11

## (undated) RX ORDER — GABAPENTIN 300 MG/1
CAPSULE ORAL
Status: DISPENSED
Start: 2021-08-11

## (undated) RX ORDER — CEFAZOLIN SODIUM 2 G/50ML
SOLUTION INTRAVENOUS
Status: DISPENSED
Start: 2024-05-07

## (undated) RX ORDER — TRIAMCINOLONE ACETONIDE 40 MG/ML
INJECTION, SUSPENSION INTRA-ARTICULAR; INTRAMUSCULAR
Status: DISPENSED
Start: 2017-02-02

## (undated) RX ORDER — FENTANYL CITRATE 50 UG/ML
INJECTION, SOLUTION INTRAMUSCULAR; INTRAVENOUS
Status: DISPENSED
Start: 2024-05-07

## (undated) RX ORDER — HYDROMORPHONE HYDROCHLORIDE 1 MG/ML
INJECTION, SOLUTION INTRAMUSCULAR; INTRAVENOUS; SUBCUTANEOUS
Status: DISPENSED
Start: 2021-08-11

## (undated) RX ORDER — ACETAMINOPHEN 325 MG/1
TABLET ORAL
Status: DISPENSED
Start: 2024-05-07